# Patient Record
Sex: FEMALE | Race: WHITE | NOT HISPANIC OR LATINO | Employment: UNEMPLOYED | URBAN - METROPOLITAN AREA
[De-identification: names, ages, dates, MRNs, and addresses within clinical notes are randomized per-mention and may not be internally consistent; named-entity substitution may affect disease eponyms.]

---

## 2017-08-11 ENCOUNTER — PATIENT OUTREACH (OUTPATIENT)
Dept: HEALTH INFORMATION MANAGEMENT | Facility: OTHER | Age: 37
End: 2017-08-11

## 2017-08-11 ENCOUNTER — RESOLUTE PROFESSIONAL BILLING HOSPITAL PROF FEE (OUTPATIENT)
Dept: HOSPITALIST | Facility: MEDICAL CENTER | Age: 37
End: 2017-08-11
Payer: COMMERCIAL

## 2017-08-11 ENCOUNTER — HOSPITAL ENCOUNTER (OUTPATIENT)
Facility: MEDICAL CENTER | Age: 37
End: 2017-08-11
Attending: EMERGENCY MEDICINE | Admitting: INTERNAL MEDICINE
Payer: COMMERCIAL

## 2017-08-11 ENCOUNTER — APPOINTMENT (OUTPATIENT)
Dept: RADIOLOGY | Facility: MEDICAL CENTER | Age: 37
End: 2017-08-11
Attending: EMERGENCY MEDICINE
Payer: COMMERCIAL

## 2017-08-11 VITALS
SYSTOLIC BLOOD PRESSURE: 117 MMHG | HEIGHT: 67 IN | TEMPERATURE: 98.7 F | OXYGEN SATURATION: 91 % | WEIGHT: 293 LBS | RESPIRATION RATE: 16 BRPM | DIASTOLIC BLOOD PRESSURE: 82 MMHG | BODY MASS INDEX: 45.99 KG/M2 | HEART RATE: 77 BPM

## 2017-08-11 DIAGNOSIS — R63.0 ANOREXIA: ICD-10-CM

## 2017-08-11 DIAGNOSIS — G90.A POSTURAL ORTHOSTATIC TACHYCARDIA SYNDROME: ICD-10-CM

## 2017-08-11 DIAGNOSIS — I95.1 ORTHOSTATIC HYPOTENSION: ICD-10-CM

## 2017-08-11 PROBLEM — R73.9 HYPERGLYCEMIA: Status: ACTIVE | Noted: 2017-08-11

## 2017-08-11 PROBLEM — E87.6 HYPOKALEMIA: Status: ACTIVE | Noted: 2017-08-11

## 2017-08-11 PROBLEM — E87.1 HYPONATREMIA: Status: ACTIVE | Noted: 2017-08-11

## 2017-08-11 LAB
ALBUMIN SERPL BCP-MCNC: 4 G/DL (ref 3.2–4.9)
ALBUMIN/GLOB SERPL: 1.1 G/DL
ALP SERPL-CCNC: 76 U/L (ref 30–99)
ALT SERPL-CCNC: 22 U/L (ref 2–50)
ANION GAP SERPL CALC-SCNC: 15 MMOL/L (ref 0–11.9)
ANION GAP SERPL CALC-SCNC: 8 MMOL/L (ref 0–11.9)
APPEARANCE UR: ABNORMAL
AST SERPL-CCNC: 21 U/L (ref 12–45)
BACTERIA #/AREA URNS HPF: ABNORMAL /HPF
BASOPHILS # BLD AUTO: 0.6 % (ref 0–1.8)
BASOPHILS # BLD: 0.04 K/UL (ref 0–0.12)
BILIRUB SERPL-MCNC: 0.4 MG/DL (ref 0.1–1.5)
BILIRUB UR QL STRIP.AUTO: NEGATIVE
BNP SERPL-MCNC: <2 PG/ML (ref 0–100)
BUN SERPL-MCNC: 11 MG/DL (ref 8–22)
BUN SERPL-MCNC: 13 MG/DL (ref 8–22)
CALCIUM SERPL-MCNC: 9.1 MG/DL (ref 8.5–10.5)
CALCIUM SERPL-MCNC: 9.5 MG/DL (ref 8.5–10.5)
CHLORIDE SERPL-SCNC: 103 MMOL/L (ref 96–112)
CHLORIDE SERPL-SCNC: 107 MMOL/L (ref 96–112)
CO2 SERPL-SCNC: 15 MMOL/L (ref 20–33)
CO2 SERPL-SCNC: 22 MMOL/L (ref 20–33)
COLOR UR: YELLOW
CREAT SERPL-MCNC: 0.58 MG/DL (ref 0.5–1.4)
CREAT SERPL-MCNC: 0.77 MG/DL (ref 0.5–1.4)
CULTURE IF INDICATED INDCX: YES UA CULTURE
DEPRECATED D DIMER PPP IA-ACNC: 218 NG/ML(D-DU)
EKG IMPRESSION: NORMAL
EKG IMPRESSION: NORMAL
EOSINOPHIL # BLD AUTO: 0.03 K/UL (ref 0–0.51)
EOSINOPHIL NFR BLD: 0.5 % (ref 0–6.9)
EPI CELLS #/AREA URNS HPF: ABNORMAL /HPF
ERYTHROCYTE [DISTWIDTH] IN BLOOD BY AUTOMATED COUNT: 46.3 FL (ref 35.9–50)
EST. AVERAGE GLUCOSE BLD GHB EST-MCNC: 134 MG/DL
GFR SERPL CREATININE-BSD FRML MDRD: >60 ML/MIN/1.73 M 2
GFR SERPL CREATININE-BSD FRML MDRD: >60 ML/MIN/1.73 M 2
GLOBULIN SER CALC-MCNC: 3.7 G/DL (ref 1.9–3.5)
GLUCOSE BLD-MCNC: 173 MG/DL (ref 65–99)
GLUCOSE SERPL-MCNC: 159 MG/DL (ref 65–99)
GLUCOSE SERPL-MCNC: 166 MG/DL (ref 65–99)
GLUCOSE UR STRIP.AUTO-MCNC: ABNORMAL MG/DL
HBA1C MFR BLD: 6.3 % (ref 0–5.6)
HCG SERPL QL: NEGATIVE
HCT VFR BLD AUTO: 40.2 % (ref 37–47)
HGB BLD-MCNC: 12.9 G/DL (ref 12–16)
HYALINE CASTS #/AREA URNS LPF: ABNORMAL /LPF
IMM GRANULOCYTES # BLD AUTO: 0.02 K/UL (ref 0–0.11)
IMM GRANULOCYTES NFR BLD AUTO: 0.3 % (ref 0–0.9)
KETONES UR STRIP.AUTO-MCNC: >150 MG/DL
LEUKOCYTE ESTERASE UR QL STRIP.AUTO: NEGATIVE
LYMPHOCYTES # BLD AUTO: 1.12 K/UL (ref 1–4.8)
LYMPHOCYTES NFR BLD: 18.2 % (ref 22–41)
MCH RBC QN AUTO: 27 PG (ref 27–33)
MCHC RBC AUTO-ENTMCNC: 32.1 G/DL (ref 33.6–35)
MCV RBC AUTO: 84.1 FL (ref 81.4–97.8)
MICRO URNS: ABNORMAL
MONOCYTES # BLD AUTO: 0.37 K/UL (ref 0–0.85)
MONOCYTES NFR BLD AUTO: 6 % (ref 0–13.4)
MUCOUS THREADS #/AREA URNS HPF: ABNORMAL /HPF
NEUTROPHILS # BLD AUTO: 4.58 K/UL (ref 2–7.15)
NEUTROPHILS NFR BLD: 74.4 % (ref 44–72)
NITRITE UR QL STRIP.AUTO: NEGATIVE
NRBC # BLD AUTO: 0 K/UL
NRBC BLD AUTO-RTO: 0 /100 WBC
PH UR STRIP.AUTO: 6 [PH]
PLATELET # BLD AUTO: 382 K/UL (ref 164–446)
PMV BLD AUTO: 10.4 FL (ref 9–12.9)
POTASSIUM SERPL-SCNC: 3.5 MMOL/L (ref 3.6–5.5)
POTASSIUM SERPL-SCNC: 4.1 MMOL/L (ref 3.6–5.5)
PROT SERPL-MCNC: 7.7 G/DL (ref 6–8.2)
PROT UR QL STRIP: 100 MG/DL
RBC # BLD AUTO: 4.78 M/UL (ref 4.2–5.4)
RBC # URNS HPF: ABNORMAL /HPF
RBC UR QL AUTO: NEGATIVE
SODIUM SERPL-SCNC: 133 MMOL/L (ref 135–145)
SODIUM SERPL-SCNC: 137 MMOL/L (ref 135–145)
SP GR UR STRIP.AUTO: 1.02
T4 FREE SERPL-MCNC: 0.79 NG/DL (ref 0.53–1.43)
TROPONIN I SERPL-MCNC: <0.01 NG/ML (ref 0–0.04)
TROPONIN I SERPL-MCNC: <0.01 NG/ML (ref 0–0.04)
TSH SERPL DL<=0.005 MIU/L-ACNC: 2.99 UIU/ML (ref 0.3–3.7)
WBC # BLD AUTO: 6.2 K/UL (ref 4.8–10.8)
WBC #/AREA URNS HPF: ABNORMAL /HPF

## 2017-08-11 PROCEDURE — 84443 ASSAY THYROID STIM HORMONE: CPT

## 2017-08-11 PROCEDURE — 700102 HCHG RX REV CODE 250 W/ 637 OVERRIDE(OP): Performed by: EMERGENCY MEDICINE

## 2017-08-11 PROCEDURE — 36415 COLL VENOUS BLD VENIPUNCTURE: CPT

## 2017-08-11 PROCEDURE — 93005 ELECTROCARDIOGRAM TRACING: CPT | Performed by: HOSPITALIST

## 2017-08-11 PROCEDURE — 700105 HCHG RX REV CODE 258: Performed by: EMERGENCY MEDICINE

## 2017-08-11 PROCEDURE — A9270 NON-COVERED ITEM OR SERVICE: HCPCS | Performed by: EMERGENCY MEDICINE

## 2017-08-11 PROCEDURE — 71010 DX-CHEST-LIMITED (1 VIEW): CPT

## 2017-08-11 PROCEDURE — 84703 CHORIONIC GONADOTROPIN ASSAY: CPT

## 2017-08-11 PROCEDURE — 83880 ASSAY OF NATRIURETIC PEPTIDE: CPT

## 2017-08-11 PROCEDURE — 93010 ELECTROCARDIOGRAM REPORT: CPT | Performed by: INTERNAL MEDICINE

## 2017-08-11 PROCEDURE — A9270 NON-COVERED ITEM OR SERVICE: HCPCS | Performed by: HOSPITALIST

## 2017-08-11 PROCEDURE — 85025 COMPLETE CBC W/AUTO DIFF WBC: CPT

## 2017-08-11 PROCEDURE — 87086 URINE CULTURE/COLONY COUNT: CPT

## 2017-08-11 PROCEDURE — 84439 ASSAY OF FREE THYROXINE: CPT

## 2017-08-11 PROCEDURE — 96361 HYDRATE IV INFUSION ADD-ON: CPT

## 2017-08-11 PROCEDURE — 700111 HCHG RX REV CODE 636 W/ 250 OVERRIDE (IP): Performed by: EMERGENCY MEDICINE

## 2017-08-11 PROCEDURE — 83036 HEMOGLOBIN GLYCOSYLATED A1C: CPT

## 2017-08-11 PROCEDURE — 84484 ASSAY OF TROPONIN QUANT: CPT | Mod: 91

## 2017-08-11 PROCEDURE — 80053 COMPREHEN METABOLIC PANEL: CPT

## 2017-08-11 PROCEDURE — G0378 HOSPITAL OBSERVATION PER HR: HCPCS

## 2017-08-11 PROCEDURE — 85379 FIBRIN DEGRADATION QUANT: CPT

## 2017-08-11 PROCEDURE — 700102 HCHG RX REV CODE 250 W/ 637 OVERRIDE(OP): Performed by: HOSPITALIST

## 2017-08-11 PROCEDURE — 700101 HCHG RX REV CODE 250: Performed by: INTERNAL MEDICINE

## 2017-08-11 PROCEDURE — 81001 URINALYSIS AUTO W/SCOPE: CPT

## 2017-08-11 PROCEDURE — 700111 HCHG RX REV CODE 636 W/ 250 OVERRIDE (IP): Performed by: INTERNAL MEDICINE

## 2017-08-11 PROCEDURE — 93005 ELECTROCARDIOGRAM TRACING: CPT

## 2017-08-11 PROCEDURE — 99235 HOSP IP/OBS SAME DATE MOD 70: CPT | Performed by: INTERNAL MEDICINE

## 2017-08-11 PROCEDURE — 82962 GLUCOSE BLOOD TEST: CPT

## 2017-08-11 PROCEDURE — 96360 HYDRATION IV INFUSION INIT: CPT

## 2017-08-11 PROCEDURE — 80048 BASIC METABOLIC PNL TOTAL CA: CPT

## 2017-08-11 PROCEDURE — 99285 EMERGENCY DEPT VISIT HI MDM: CPT

## 2017-08-11 RX ORDER — SODIUM CHLORIDE AND POTASSIUM CHLORIDE 150; 900 MG/100ML; MG/100ML
INJECTION, SOLUTION INTRAVENOUS CONTINUOUS
Status: DISCONTINUED | OUTPATIENT
Start: 2017-08-11 | End: 2017-08-11 | Stop reason: HOSPADM

## 2017-08-11 RX ORDER — POTASSIUM CHLORIDE 20 MEQ/1
20 TABLET, EXTENDED RELEASE ORAL ONCE
Status: COMPLETED | OUTPATIENT
Start: 2017-08-11 | End: 2017-08-11

## 2017-08-11 RX ORDER — SODIUM CHLORIDE 9 MG/ML
1000 INJECTION, SOLUTION INTRAVENOUS ONCE
Status: COMPLETED | OUTPATIENT
Start: 2017-08-11 | End: 2017-08-11

## 2017-08-11 RX ORDER — ONDANSETRON 2 MG/ML
4 INJECTION INTRAMUSCULAR; INTRAVENOUS EVERY 4 HOURS PRN
Status: DISCONTINUED | OUTPATIENT
Start: 2017-08-11 | End: 2017-08-11 | Stop reason: HOSPADM

## 2017-08-11 RX ORDER — HEPARIN SODIUM 5000 [USP'U]/ML
5000 INJECTION, SOLUTION INTRAVENOUS; SUBCUTANEOUS EVERY 8 HOURS
Status: DISCONTINUED | OUTPATIENT
Start: 2017-08-11 | End: 2017-08-11 | Stop reason: HOSPADM

## 2017-08-11 RX ORDER — PROMETHAZINE HYDROCHLORIDE 25 MG/1
12.5-25 SUPPOSITORY RECTAL EVERY 4 HOURS PRN
Status: DISCONTINUED | OUTPATIENT
Start: 2017-08-11 | End: 2017-08-11 | Stop reason: HOSPADM

## 2017-08-11 RX ORDER — LISINOPRIL 5 MG/1
5 TABLET ORAL DAILY
Qty: 30 TAB | Refills: 1 | Status: SHIPPED | OUTPATIENT
Start: 2017-08-11 | End: 2020-02-01

## 2017-08-11 RX ORDER — FAMOTIDINE 20 MG/1
20 TABLET, FILM COATED ORAL 2 TIMES DAILY
Status: DISCONTINUED | OUTPATIENT
Start: 2017-08-11 | End: 2017-08-11 | Stop reason: HOSPADM

## 2017-08-11 RX ORDER — ONDANSETRON 2 MG/ML
4 INJECTION INTRAMUSCULAR; INTRAVENOUS ONCE
Status: COMPLETED | OUTPATIENT
Start: 2017-08-11 | End: 2017-08-11

## 2017-08-11 RX ORDER — FAMOTIDINE 20 MG/1
20 TABLET, FILM COATED ORAL 2 TIMES DAILY
Qty: 60 TAB | Refills: 1 | Status: SHIPPED | OUTPATIENT
Start: 2017-08-11 | End: 2020-02-01

## 2017-08-11 RX ORDER — ONDANSETRON 4 MG/1
4 TABLET, ORALLY DISINTEGRATING ORAL EVERY 4 HOURS PRN
Status: DISCONTINUED | OUTPATIENT
Start: 2017-08-11 | End: 2017-08-11 | Stop reason: HOSPADM

## 2017-08-11 RX ORDER — AMOXICILLIN 250 MG
2 CAPSULE ORAL 2 TIMES DAILY
Status: DISCONTINUED | OUTPATIENT
Start: 2017-08-11 | End: 2017-08-11 | Stop reason: HOSPADM

## 2017-08-11 RX ORDER — PROMETHAZINE HYDROCHLORIDE 25 MG/1
12.5-25 TABLET ORAL EVERY 4 HOURS PRN
Status: DISCONTINUED | OUTPATIENT
Start: 2017-08-11 | End: 2017-08-11 | Stop reason: HOSPADM

## 2017-08-11 RX ORDER — MORPHINE SULFATE 4 MG/ML
1 INJECTION, SOLUTION INTRAMUSCULAR; INTRAVENOUS
Status: DISCONTINUED | OUTPATIENT
Start: 2017-08-11 | End: 2017-08-11 | Stop reason: HOSPADM

## 2017-08-11 RX ORDER — ACETAMINOPHEN 325 MG/1
650 TABLET ORAL EVERY 6 HOURS PRN
Status: DISCONTINUED | OUTPATIENT
Start: 2017-08-11 | End: 2017-08-11 | Stop reason: HOSPADM

## 2017-08-11 RX ORDER — LISINOPRIL 10 MG/1
10 TABLET ORAL DAILY
COMMUNITY
End: 2020-02-01

## 2017-08-11 RX ORDER — BISACODYL 10 MG
10 SUPPOSITORY, RECTAL RECTAL
Status: DISCONTINUED | OUTPATIENT
Start: 2017-08-11 | End: 2017-08-11 | Stop reason: HOSPADM

## 2017-08-11 RX ORDER — POLYETHYLENE GLYCOL 3350 17 G/17G
1 POWDER, FOR SOLUTION ORAL
Status: DISCONTINUED | OUTPATIENT
Start: 2017-08-11 | End: 2017-08-11 | Stop reason: HOSPADM

## 2017-08-11 RX ADMIN — HEPARIN SODIUM 5000 UNITS: 5000 INJECTION, SOLUTION INTRAVENOUS; SUBCUTANEOUS at 06:46

## 2017-08-11 RX ADMIN — FAMOTIDINE 20 MG: 20 TABLET, FILM COATED ORAL at 11:14

## 2017-08-11 RX ADMIN — POTASSIUM CHLORIDE 20 MEQ: 1500 TABLET, EXTENDED RELEASE ORAL at 04:29

## 2017-08-11 RX ADMIN — SODIUM CHLORIDE 1000 ML: 9 INJECTION, SOLUTION INTRAVENOUS at 04:29

## 2017-08-11 RX ADMIN — ONDANSETRON 4 MG: 2 INJECTION INTRAMUSCULAR; INTRAVENOUS at 04:40

## 2017-08-11 RX ADMIN — SODIUM CHLORIDE 1000 ML: 9 INJECTION, SOLUTION INTRAVENOUS at 03:25

## 2017-08-11 RX ADMIN — POTASSIUM CHLORIDE AND SODIUM CHLORIDE: 900; 150 INJECTION, SOLUTION INTRAVENOUS at 09:00

## 2017-08-11 ASSESSMENT — COGNITIVE AND FUNCTIONAL STATUS - GENERAL
SUGGESTED CMS G CODE MODIFIER DAILY ACTIVITY: CH
MOBILITY SCORE: 24
SUGGESTED CMS G CODE MODIFIER MOBILITY: CH
DAILY ACTIVITIY SCORE: 24

## 2017-08-11 ASSESSMENT — ENCOUNTER SYMPTOMS
NECK PAIN: 0
DEPRESSION: 0
BLURRED VISION: 0
CHILLS: 0
ORTHOPNEA: 0
COUGH: 0
SPUTUM PRODUCTION: 0
FOCAL WEAKNESS: 0
EYE REDNESS: 0
BACK PAIN: 0
WEIGHT LOSS: 0
ABDOMINAL PAIN: 0
HEADACHES: 0
NAUSEA: 1
NERVOUS/ANXIOUS: 0
DIZZINESS: 0
FEVER: 0
EYE DISCHARGE: 0
EYE PAIN: 0
DIARRHEA: 0
SHORTNESS OF BREATH: 0
VOMITING: 0
STRIDOR: 0
INSOMNIA: 0
MYALGIAS: 0
SEIZURES: 0
HEARTBURN: 0
PALPITATIONS: 0

## 2017-08-11 ASSESSMENT — LIFESTYLE VARIABLES
EVER_SMOKED: YES
DO YOU DRINK ALCOHOL: NO
ALCOHOL_USE: NO

## 2017-08-11 ASSESSMENT — PAIN SCALES - GENERAL
PAINLEVEL_OUTOF10: 0
PAINLEVEL_OUTOF10: 8
PAINLEVEL_OUTOF10: 0
PAINLEVEL_OUTOF10: 8

## 2017-08-11 NOTE — ED NOTES
Pt assisted to bathroom by LIZ. Urine sample collected and sent to lab. IVFs infusing, see MAR. X-ray at bedside.

## 2017-08-11 NOTE — ED NOTES
"Chief Complaint   Patient presents with   • Chest Pain     Intermittent mid-sternal CP, pt states it feels like \"needles\" in chest   • Nausea/Vomiting/Diarrhea     /65 mmHg  Pulse 56  Temp(Src) 36.5 °C (97.7 °F)  Resp 16  Ht 1.702 m (5' 7\")  Wt 140.615 kg (310 lb)  BMI 48.54 kg/m2  SpO2 99%    Pt brought in by SERAFIN from work, pt states she became diaphoretic and felt like she was going to pass out. Pt has been fasting x6 days. Also reports N/V/D. EKG completed. PIV in place pta. Placed in room RD 2. Complaints and vitals as above. Pt on monitors, all alarms audible. Chart flagged for ERP to see.  "

## 2017-08-11 NOTE — LETTER
"  FORM C-4:  EMPLOYEE’S CLAIM FOR COMPENSATION/ REPORT OF INITIAL TREATMENT  EMPLOYEE’S CLAIM - PROVIDE ALL INFORMATION REQUESTED   First Name  Jennifer Last Name  Nathanael Birthdate             Age  1980 37 y.o. 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 (if applicable)  sitting    How did this injury or occupational disease occur? Be specific and answer in detail. Use additional sheet if necessary)  Unsure   If you believe that you have an occupational disease, when did you first have knowledge of the disability and it relationship to your employment?  n/a Witnesses to the Accident  Ludwin Castillo     Nature of Injury or Occupational Disease  Mental Stress  Part(s) of Body Injured or Affected  Heart, N/A, N/A    I certify that the above is true and correct to the best of my knowledge and that I have provided this information in order to obtain the benefits of [place] " inclusive or [place]).  I hereby authorize any physician, chiropractor, surgeon, practitioner, or other person, any hospital, including [facility] or [facility] hospital, any medical service organization, any insurance company, or other institution or organization to release to each other, any medical or other information, including benefits paid or payable, pertinent to this injury or disease, except information relative to diagnosis, treatment and/or counseling for AIDS, psychological conditions, alcohol or controlled substances, for which I must give specific authorization.  A Photostat of this authorization shall be as valid as the original.   Date  8/11/2017 Place  [facility]  Employee’s Signature   THIS REPORT MUST BE COMPLETED AND MAILED WITHIN 3 WORKING DAYS OF TREATMENT   Place  [facility], EMERGENCY DEPT  Name of Facility   [facility]   Date  8/11/2017 Diagnosis  (I95.1) Orthostatic hypotension  (R00.0,  I95.1) Postural orthostatic tachycardia syndrome  (R63.0) Anorexia Is there evidence the injured employee was under the influence of alcohol and/or another controlled substance at the time of accident?   Hour  4:43 AM Description of Injury or Disease  Orthostatic hypotension  Postural orthostatic tachycardia syndrome  Anorexia No   Treatment  Fluids  Have you advised the patient to remain off work five days or more?         No   X-Ray Findings      If Yes   From Date    To Date      From information given by the employee, together with medical evidence, can you directly connect this injury or occupational disease as job incurred?  No If No, is the employee capable of: Full Duty    Modified Duty      Is additional medical care by a physician indicated?  Yes If Modified Duty, Specify any Limitations / Restrictions        Do you know of any previous injury or disease contributing to this condition or occupational  "disease?  Yes  Comments:fasting, history of anorexia   Date  8/11/2017 Print Doctor’s Name  Cheyenne Panchal I certify the employer’s copy of this form was mailed on:   Address  [address] [address] [phone] Insurer’s Use Only   [facility]  [address]    Provider’s Tax ID Number  [id number] Telephone  Dept: [phone]    Doctor’s Signature  e-CHEYENNE Hawthorne M.D. Degree   MD     Original - TREATING PHYSICIAN OR CHIROPRACTOR   Pg 2-Insurer/TPA   Pg 3-Employer   Pg 4-Employee                                                                                                  Form C-4 (rev01/03)     BRIEF DESCRIPTION OF RIGHTS AND BENEFITS  (Pursuant to [place]RS 616C.050)    Notice of Injury or Occupational Disease (Incident Report Form C-1): If an injury or occupational disease (OD) arises out of and in the course of employment, you must provide written notice to your employer as soon as practicable, but no later than 7 days after the accident or OD. Your employer shall maintain a sufficient supply of the required forms.    Claim for Compensation (Form C-4): If medical treatment is sought, the form C-4 is available at the place of initial treatment. A completed \"Claim for Compensation\" (Form C-4) must be filed within 90 days after an accident or OD. The treating physician or chiropractor must, within 3 working days after treatment, complete and mail to the employer, the employer's insurer and third-party , the Claim for Compensation.    Medical Treatment: If you require medical treatment for your on-the-job injury or OD, you may be required to select a physician or chiropractor from a list provided by your workers’ compensation insurer, if it has contracted with an Organization for Managed Care (MCO) or Preferred Provider Organization (PPO) or providers of health care. If your employer has not entered into a contract with an MCO or PPO, you may select a physician or " chiropractor from the Panel of Physicians and Chiropractors. Any medical costs related to your industrial injury or OD will be paid by your insurer.    Temporary Total Disability (TTD): If your doctor has certified that you are unable to work for a period of at least 5 consecutive days, or 5 cumulative days in a 20-day period, or places restrictions on you that your employer does not accommodate, you may be entitled to TTD compensation.    Temporary Partial Disability (TPD): If the wage you receive upon reemployment is less than the compensation for TTD to which you are entitled, the insurer may be required to pay you TPD compensation to make up the difference. TPD can only be paid for a maximum of 24 months.    Permanent Partial Disability (PPD): When your medical condition is stable and there is an indication of a PPD as a result of your injury or OD, within 30 days, your insurer must arrange for an evaluation by a rating physician or chiropractor to determine the degree of your PPD. The amount of your PPD award depends on the date of injury, the results of the PPD evaluation and your age and wage.    Permanent Total Disability (PTD): If you are medically certified by a treating physician or chiropractor as permanently and totally disabled and have been granted a PTD status by your insurer, you are entitled to receive monthly benefits not to exceed 66 2/3% of your average monthly wage. The amount of your PTD payments is subject to reduction if you previously received a PPD award.    Vocational Rehabilitation Services: You may be eligible for vocational rehabilitation services if you are unable to return to the job due to a permanent physical impairment or permanent restrictions as a result of your injury or occupational disease.    Transportation and Per Bonifacio Reimbursement: You may be eligible for travel expenses and per bonifacio associated with medical treatment.  Reopening: You may be able to reopen your claim if  your condition worsens after claim closure.    Appeal Process: If you disagree with a written determination issued by the insurer or the insurer does not respond to your request, you may appeal to the Department of Administration, , by following the instructions contained in your determination letter. You must appeal the determination within 70 days from the date of the determination letter at [address], [address], [place], [place] [address], or [address], [place], [place] [address]. If you disagree with the  decision, you may appeal to the Department of Administration, . You must file your appeal within 30 days from the date of the  decision letter at [address], [address], [place], [place] [address], or [address], [place], [place] [address]. If you disagree with a decision of an , you may file a petition for judicial review with the District Court. You must do so within 30 days of the Appeal Officer’s decision. You may be represented by an  at your own expense or you may contact the [facility] for possible representation.    [place]  for Injured Workers (NAIW): If you disagree with a  decision, you may request that NAIW represent you without charge at an  Hearing. For information regarding denial of benefits, you may contact the [facility] at: [address], [address], [place] [address], [phone], or [address], [place] [address], [phone]    To File a Complaint with the Division: If you wish to file a complaint with the  of the Division of Industrial Relations (DIR), please contact the Workers’ Compensation Section, [address], [address], [place], [place] [address], telephone [phone], or [address], [place], [place] [address], telephone [phone]  [phone].    For assistance with Workers’ Compensation Issues: you may contact the Office of the Governor Consumer Health Assistance, [address] [place] [address], [address], [place] [address], Toll Free 1-198.707.4616, Web site: http://govcha.[facility].[email or web address]., E-mail [email or web address]                                                                                                                                                                               __________________________________________________________________                                    _________________            Employee Name / Signature                                                                                                                            Date                                       D-2 (rev. 10/07)

## 2017-08-11 NOTE — PROGRESS NOTES
Patient complains of 7/10 sharp mid-sternal chest pain that lasted for 1 minute. Previous troponin levels and EKG negative. Dr. Casas ordered antiacid.

## 2017-08-11 NOTE — H&P
" Hospital Medicine History and Physical      Date of Service  8/11/2017    Chief Complaint  Chief Complaint   Patient presents with   • Chest Pain     Intermittent mid-sternal CP, pt states it feels like \"needles\" in chest   • Nausea/Vomiting/Diarrhea       History of Presenting Illness  Nathanael is a 37 y.o. female w/h/o HTN: who presents with dizziness started around 10PM last night. She said she took her lisinopril around 9 pm. She works at night. She also mentioned that she is fasting and only drinking water for the past week, which she does it routinely and sometimes she fast for a month. In ER: she was found to have orthostatic hypotension. She was started on IVF in ER, will be admitted for IVF and close BP monitoring.    Primary Care Physician  Pcp Pt States None        Code Status  Full code per patient     Review of Systems  Review of Systems   Constitutional: Negative for fever, chills and weight loss.   HENT: Negative for congestion and nosebleeds.    Eyes: Negative for blurred vision, pain, discharge and redness.   Respiratory: Negative for cough, sputum production, shortness of breath and stridor.    Cardiovascular: Negative for chest pain, palpitations and orthopnea.   Gastrointestinal: Positive for nausea. Negative for heartburn, vomiting, abdominal pain and diarrhea.   Genitourinary: Negative for dysuria, urgency and frequency.   Musculoskeletal: Negative for myalgias, back pain and neck pain.   Skin: Negative for itching and rash.   Neurological: Negative for dizziness, focal weakness, seizures and headaches.   Psychiatric/Behavioral: Negative for depression. The patient is not nervous/anxious and does not have insomnia.      Please see HPI, all other systems were reviewed and are negative (AMA/CMS criteria)     Past Medical History  Past Medical History   Diagnosis Date   • Heart attack (CMS-HCC)      heart attack @ age of 17?   • Kidney stones    • Hypertension        Surgical History  History " reviewed. No pertinent past surgical history.    Medications  No current facility-administered medications on file prior to encounter.     Current Outpatient Prescriptions on File Prior to Encounter   Medication Sig Dispense Refill   • lisinopril (PRINIVIL) 10 MG Tab Take 1 Tab by mouth every day. 30 Tab 11     Family History  History reviewed. No pertinent family history.      Social History  Social History   Substance Use Topics   • Smoking status: Never Smoker    • Smokeless tobacco: None   • Alcohol Use: No       Allergies  Allergies   Allergen Reactions   • Hydrocodone Rash     Rxn = 2015   • Ibuprofen Unspecified     Hard on kidneys     • Latex Rash   • Tramadol Unspecified     Makes her sick          Physical Exam  Laboratory   Hemodynamics  Temp (24hrs), Av.5 °C (97.7 °F), Min:36.5 °C (97.7 °F), Max:36.5 °C (97.7 °F)   Temperature: 36.5 °C (97.7 °F)  Pulse  Av.9  Min: 56  Max: 127 Heart Rate (Monitored): 100  Blood Pressure: 113/65 mmHg, NIBP: 127/79 mmHg      Respiratory      Respiration: 18, Pulse Oximetry: 94 %             Physical Exam   Constitutional: She is oriented to person, place, and time. No distress.   HENT:   Head: Normocephalic and atraumatic.   Mouth/Throat: Oropharynx is clear and moist.   Eyes: Conjunctivae and EOM are normal. Pupils are equal, round, and reactive to light.   Neck: Normal range of motion. Neck supple. No tracheal deviation present. No thyromegaly present.   Cardiovascular: Normal rate and regular rhythm.    No murmur heard.  Pulmonary/Chest: Effort normal and breath sounds normal. No respiratory distress. She has no wheezes.   Abdominal: Soft. Bowel sounds are normal. She exhibits no distension. There is no tenderness.   Musculoskeletal: She exhibits no edema or tenderness.   Neurological: She is alert and oriented to person, place, and time. No cranial nerve deficit.   Skin: Skin is warm and dry. She is not diaphoretic. No erythema.   Psychiatric: She has a normal  mood and affect. Her behavior is normal. Thought content normal.       Recent Labs      08/11/17 0210   WBC  6.2   RBC  4.78   HEMOGLOBIN  12.9   HEMATOCRIT  40.2   MCV  84.1   MCH  27.0   MCHC  32.1*   RDW  46.3   PLATELETCT  382   MPV  10.4     Recent Labs      08/11/17 0210   SODIUM  133*   POTASSIUM  3.5*   CHLORIDE  103   CO2  15*   GLUCOSE  166*   BUN  13   CREATININE  0.77   CALCIUM  9.5     Recent Labs      08/11/17 0210   ALTSGPT  22   ASTSGOT  21   ALKPHOSPHAT  76   TBILIRUBIN  0.4   GLUCOSE  166*                 Lab Results   Component Value Date    TROPONINI <0.01 08/11/2017       Imaging  DX-CHEST-LIMITED (1 VIEW)   Final Result      Hypoinflation without other evidence for acute cardiopulmonary disease.           Assessment/Plan         Orthostatic hypotension (present on admission)  Assessment & Plan  Hold lisinopril  On IVF  Monitor BP closely    Hyponatremia (present on admission)  Assessment & Plan  On IVF    Hypokalemia (present on admission)  Assessment & Plan  Replete as needed    Hyperglycemia (present on admission)  Assessment & Plan  Check A1c  No history of DM        Prophylaxis:  sc heparin         This dictation was created using voice recognition software. The accuracy of the dictation is limited to the abilities of the software. I expect there may be some errors of grammar and possibly content.

## 2017-08-11 NOTE — DISCHARGE INSTRUCTIONS
Discharge Instructions    Discharged to home by car with relative. Discharged via wheelchair, hospital escort: Yes.  Special equipment needed: Not Applicable    Be sure to schedule a follow-up appointment with your primary care doctor or any specialists as instructed.     Discharge Plan:   Diet Plan: Discussed  Activity Level: Discussed  Confirmed Follow up Appointment: Patient to Call and Schedule Appointment  Confirmed Symptoms Management: Discussed  Medication Reconciliation Updated: Yes  Influenza Vaccine Indication: Patient Refuses    I understand that a diet low in cholesterol, fat, and sodium is recommended for good health. Unless I have been given specific instructions below for another diet, I accept this instruction as my diet prescription.     Other diet: Regular diet as tolerated. Please avoid fasting.     Special Instructions: None    · Is patient discharged on Warfarin / Coumadin?   No     · Is patient Post Blood Transfusion?  No    Hypotension  As your heart beats, it forces blood through your body. This force is called blood pressure. If you have hypotension, you have low blood pressure. When your blood pressure is too low, you may not get enough blood to your brain. You may feel weak, feel lightheaded, have a fast heartbeat, or even pass out (faint).  HOME CARE  · Drink enough fluids to keep your pee (urine) clear or pale yellow.  · Take all medicines as told by your doctor.  · Get up slowly after sitting or lying down.  · Wear support stockings as told by your doctor.  · Maintain a healthy diet by including foods such as fruits, vegetables, nuts, whole grains, and lean meats.  GET HELP IF:  · You are throwing up (vomiting) or have watery poop (diarrhea).  · You have a fever for more than 2-3 days.  · You feel more thirsty than usual.  · You feel weak and tired.  GET HELP RIGHT AWAY IF:   · You pass out (faint).  · You have chest pain or a fast or irregular heartbeat.  · You lose feeling in part of  your body.  · You cannot move your arms or legs.  · You have trouble speaking.  · You get sweaty or feel lightheaded.  MAKE SURE YOU:   · Understand these instructions.  · Will watch your condition.  · Will get help right away if you are not doing well or get worse.     This information is not intended to replace advice given to you by your health care provider. Make sure you discuss any questions you have with your health care provider.     Document Released: 03/14/2011 Document Revised: 08/20/2014 Document Reviewed: 06/20/2014  GMG33 Interactive Patient Education ©2016 [employer]vier Inc.      Depression / Suicide Risk    As you are discharged from this [facility] facility, it is important to learn how to keep safe from harming yourself.    Recognize the warning signs:  · Abrupt changes in personality, positive or negative- including increase in energy   · Giving away possessions  · Change in eating patterns- significant weight changes-  positive or negative  · Change in sleeping patterns- unable to sleep or sleeping all the time   · Unwillingness or inability to communicate  · Depression  · Unusual sadness, discouragement and loneliness  · Talk of wanting to die  · Neglect of personal appearance   · Rebelliousness- reckless behavior  · Withdrawal from people/activities they love  · Confusion- inability to concentrate     If you or a loved one observes any of these behaviors or has concerns about self-harm, here's what you can do:  · Talk about it- your feelings and reasons for harming yourself  · Remove any means that you might use to hurt yourself (examples: pills, rope, extension cords, firearm)  · Get professional help from the community (Mental Health, Substance Abuse, psychological counseling)  · Do not be alone:Call your Safe Contact- someone whom you trust who will be there for you.  · Call your local CRISIS HOTLINE 219-3011 or 355-629-8230  · Call your local Children's Mobile Crisis Response Team [place] (126) 046-5746 or www.[employer]  · Call the toll free National Suicide Prevention Hotlines   · National Suicide Prevention Lifeline 408-609-DBPI (6868)  · National [employer] Line Network 800-SUICIDE (484-9992)

## 2017-08-11 NOTE — PROGRESS NOTES
Pt to be discharged home. Discharge instructions given to pt and pt's mother prior to leaving unit including when to see PCP, and new medications. IV and monitor removed. All questions answered. Verbalized understanding. All belongings with pt when leaving unit. RX sent to pharmacy.

## 2017-08-11 NOTE — PROGRESS NOTES
"Patient reported 8/10 pain mid sternal chest pain. Patient states that the pain feels like pins and needles. Hurts more when taking a deep breath.  Night RN paged to room. Dr. Casas paged. Stat EKG ordered and troponin. Duration on chest pain about 3 minutes. Pt states that this chest pain is like the previous chest pain episode last night that \"comes and goes\". Discussed POC for the day with Pt. Call light within reach, encouraged pt to call for assistance.   "

## 2017-08-11 NOTE — ED PROVIDER NOTES
"ED Provider Note    Scribed for Brianna Panchal M.D. by Luci Smalls. 8/11/2017, 2:46 AM.    Primary care provider: Pcp Pt States None  Means of arrival: Ambulance  History obtained from: Patient  History limited by: None    CHIEF COMPLAINT  Chief Complaint   Patient presents with   • Chest Pain     Intermittent mid-sternal CP, pt states it feels like \"needles\" in chest   • Nausea/Vomiting/Diarrhea       HPI  Jennifer Huffman is a 37 y.o. female who presents to the Emergency Department via ambulance for fatigue and acute loss of energy earlier this evening. The patient states that she has been fasting for six days and when she took her blood pressure medication today, she became diaphoretic and fell at work. She has been drinking only water for the past 6 days with no other fluid intake and no food intake. She states she laid on the floor for an hour after her legs buckled under her but did not lose consciousness. The patient began to feel improved after drinking some juice and broth. However, she endorses nausea and vomiting with four episodes tonight and diarrhea onset 2200. The patient has experienced dizziness and chest pain that is exacerbated by deep inspiration and feels like \"pins and needles\". She notes that when she had a stress test done in December, she had a similar episode of near syncope. The patient adds that she has done similar fasts before for longer periods of time without adverse effects. The patient denies history of diabetes, hormonal birth control usage, history of DVT or PE.     REVIEW OF SYSTEMS  Pertinent positives include diaphoresis, chills, nausea, vomiting, diarrhea, fatigue, fasting, hypertension, chest tingling. Pertinent negatives include no loss of consciousness, diabetes, hormonal birth control, history of DVT or PE.  All other systems reviewed and negative.  C    PAST MEDICAL HISTORY   has a past medical history of Heart attack (CMS-[facility]); Kidney stones; and Hypertension.Stress " "test in December - Normal      SURGICAL HISTORY  patient denies any surgical history    SOCIAL HISTORY  Social History   Substance Use Topics   • Smoking status: Never Smoker    • Smokeless tobacco: None   • Alcohol Use: No      History   Drug Use No       FAMILY HISTORY  History reviewed. No pertinent family history.    CURRENT MEDICATIONS    No current facility-administered medications on file prior to encounter.     Current Outpatient Prescriptions on File Prior to Encounter   Medication Sig Dispense Refill   • lisinopril (PRINIVIL) 10 MG Tab Take 1 Tab by mouth every day. 30 Tab 11     ALLERGIES  Allergies   Allergen Reactions   • Hydrocodone Rash     Rxn = 2015   • Ibuprofen Unspecified     Hard on kidneys     • Latex Rash   • Tramadol Unspecified     Makes her sick         PHYSICAL EXAM  Vital Signs: /65 mmHg  Pulse 56  Temp(Src) 36.5 °C (97.7 °F)  Resp 16  Ht 1.702 m (5' 7\")  Wt 140.615 kg (310 lb)  BMI 48.54 kg/m2  SpO2 99%  Constitutional: Alert, no acute distress, Obese  HENT: Normocephalic, atraumatic, moist mucus membranes  Eyes: Pupils equal and reactive, normal conjunctiva, non-icteric  Neck: Supple, normal range of motion, no stridor  Cardiovascular: Regular rhythm, Normal peripheral perfusion, no cyanosis, Normal cardiac auscultation  Pulmonary: No respiratory distress, normal work of breathing, no accessory muscle usage, Clear to auscultation bilaterally  Abdomen: Soft, non tender, no peritoneal signs, bowel sounds are present.   Skin: Warm, dry, no rashes or lesions  Back: No pain with active range of motion  Musculoskeletal: Normal range of motion in all extremities, no swelling or deformity noted  Neurologic: Alert, oriented, normal motor function, no speech deficits  Psychiatric: Normal and appropriate mood and affect    DIAGNOSTIC STUDIES/PROCEDURES:    LABS  Labs Reviewed   CBC WITH DIFFERENTIAL - Abnormal; Notable for the following:     MCHC 32.1 (*)     Neutrophils-Polys 74.40 " (*)     Lymphocytes 18.20 (*)     All other components within normal limits   COMP METABOLIC PANEL - Abnormal; Notable for the following:     Sodium 133 (*)     Potassium 3.5 (*)     Co2 15 (*)     Anion Gap 15.0 (*)     Glucose 166 (*)     Globulin 3.7 (*)     All other components within normal limits   URINALYSIS,CULTURE IF INDICATED - Abnormal; Notable for the following:     Character Sl Cloudy (*)     Glucose Trace (*)     Ketones >150 (*)     Protein 100 (*)     All other components within normal limits   URINE MICROSCOPIC (W/UA) - Abnormal; Notable for the following:     WBC 5-10 (*)     Bacteria Few (*)     Epithelial Cells Moderate (*)     Hyaline Cast 3-5 (*)     All other components within normal limits   ACCU-CHEK GLUCOSE - Abnormal; Notable for the following:     Glucose - Accu-Ck 173 (*)     All other components within normal limits   TSH   FREE THYROXINE   HCG QUAL SERUM   D-DIMER   TROPONIN   ESTIMATED GFR   URINE CULTURE(NEW)     All labs reviewed by me.    EKG  12 Lead EKG interpreted by me to show:  Rate 103, sinus tachycardia, no ST elevation or depression, no T-wave inversions, and T-wave flattening in aVL    Radiology results revealed:   DX-CHEST-LIMITED (1 VIEW)   Final Result      Hypoinflation without other evidence for acute cardiopulmonary disease.        COURSE & MEDICAL DECISION MAKING  Pertinent Labs & Imaging studies reviewed. (See chart for details)    Differential diagnoses include but are not limited to: Electrolyte abnormality, hypokalemia, dehydration, orthostatic hypotension, DVT, pulmonary embolism    2:52 AM - Patient seen and examined at bedside. Patient will be treated with IV fluids secondary to dehydration. Ordered Chest X-ray, CBC, CMP, TSH, Free thyroxine, Urinalysis, Beta HCG Wal, D-Dimer, Troponin, Accu-chek glucose, EKG to evaluate her symptoms.     Decision Making:  This is a 37 y.o. year old female who presents with lightheadedness and near syncope earlier today. She  had 2 episodes of near syncope. States that she has had some associated vomiting and diarrhea for the past 24-48 hours. On arrival to the emergency department she has a normal heart rate, normal blood pressure while lying. Symptoms started after she took her home dose of lisinopril. Additionally as noted above she has been drinking only water for the past 6 days, has not taken any other food or fluids in.    Her physical exam is unremarkable. She has no abdominal tenderness to palpation, normal cardiopulmonary auscultation. She does have orthostatic hypotension with a drop in systolic blood pressure from 129 to 104 from sitting to standing, additionally she is orthostatic tachycardia with elevation of blood pressure from 111-127 when moving from a sitting to standing position.    On laboratory evaluation urinalysis significant for elevated ketones. Few bacteria, moderate epithelial cells, suspect this is contamination. White blood count is normal without evidence of infection. CO2 is low at 15, suspect associated diarrhea. Sodium and potassium are both low, this was treated with fluid bolus and oral potassium in the emergency department. HCG is negative ruling out pregnancy and pregnancy related complications. She has no pulmonary embolism risk factors, suspect her symptoms are due to fasting and recent antihypertensive use. In this low probability patient her d-dimer is negative, do not believe she requires CTA for further evaluation. EKG without evidence of ischemia or arrhythmia, troponin is undetectable.    On my reassessment, she is still feeling symptomatic, states that she is not able to ambulate without assistance due to severe lightheadedness. Given her inability to walk, orthostatic tachycardia and orthostatic hypotension plan is for admission for continued fluid resuscitation. Additionally this may be the effects of her lisinopril. She does state she has a history of anorexia and that the fasting means of  control. She may benefit from outpatient resources at discharge.    Admit to hospitalist service in guarded condition    FINAL IMPRESSION  1. Orthostatic hypotension    2. Postural orthostatic tachycardia syndrome    3. Anorexia          Luci ROME (Scribe), am scribing for, and in the presence of, Brianna Panchal M.D..    Electronically signed by: Luci Smalls (Scribe), 8/11/2017    Brianna ROME M.D. personally performed the services described in this documentation, as scribed by Luci Smalls in my presence, and it is both accurate and complete.    The note accurately reflects work and decisions made by me.  Brianna Panchal  8/11/2017  4:34 AM

## 2017-08-11 NOTE — PROGRESS NOTES
Med rec complete per pt at bedside  Allergies reviewed  No oral ABX within last 30 days   Pt states she thinks her Lisinopril is supposed to be cut in half to 5mg which was changed in hospital by doctor   Pt has been taking 10mg at home

## 2017-08-11 NOTE — IP AVS SNAPSHOT
" Home Care Instructions                                                                                                                  Name:Jennifer Huffman  Medical Record Number:[id number]  CSN: [id number]    YOB: 1980   Age: 37 y.o.  Sex: female  HT:1.702 m (5' 7.01\") WT: 137.3 kg (302 lb 11.1 oz)          Admit Date: 8/11/2017     Discharge Date:   Today's Date: 8/11/2017  Attending Doctor:  Cole Casas M.D.                  Allergies:  Bloodless; Ibuprofen; Latex; Tramadol; and Vicodin            Discharge Instructions       Discharge Instructions    Discharged to home by car with relative. Discharged via wheelchair, hospital escort: Yes.  Special equipment needed: Not Applicable    Be sure to schedule a follow-up appointment with your primary care doctor or any specialists as instructed.     Discharge Plan:   Diet Plan: Discussed  Activity Level: Discussed  Confirmed Follow up Appointment: Patient to Call and Schedule Appointment  Confirmed Symptoms Management: Discussed  Medication Reconciliation Updated: Yes  Influenza Vaccine Indication: Patient Refuses    I understand that a diet low in cholesterol, fat, and sodium is recommended for good health. Unless I have been given specific instructions below for another diet, I accept this instruction as my diet prescription.     Other diet: Regular diet as tolerated. Please avoid fasting.     Special Instructions: None    · Is patient discharged on Warfarin / Coumadin?   No     · Is patient Post Blood Transfusion?  No    Hypotension  As your heart beats, it forces blood through your body. This force is called blood pressure. If you have hypotension, you have low blood pressure. When your blood pressure is too low, you may not get enough blood to your brain. You may feel weak, feel lightheaded, have a fast heartbeat, or even pass out (faint).  HOME CARE  · Drink enough fluids to keep your pee (urine) clear or pale yellow.  · Take all medicines as told by " your doctor.  · Get up slowly after sitting or lying down.  · Wear support stockings as told by your doctor.  · Maintain a healthy diet by including foods such as fruits, vegetables, nuts, whole grains, and lean meats.  GET HELP IF:  · You are throwing up (vomiting) or have watery poop (diarrhea).  · You have a fever for more than 2-3 days.  · You feel more thirsty than usual.  · You feel weak and tired.  GET HELP RIGHT AWAY IF:   · You pass out (faint).  · You have chest pain or a fast or irregular heartbeat.  · You lose feeling in part of your body.  · You cannot move your arms or legs.  · You have trouble speaking.  · You get sweaty or feel lightheaded.  MAKE SURE YOU:   · Understand these instructions.  · Will watch your condition.  · Will get help right away if you are not doing well or get worse.     This information is not intended to replace advice given to you by your health care provider. Make sure you discuss any questions you have with your health care provider.     Document Released: 03/14/2011 Document Revised: 08/20/2014 Document Reviewed: 06/20/2014  [employer] Interactive Patient Education ©2016 [employer]      Depression / Suicide Risk    As you are discharged from this [facility] facility, it is important to learn how to keep safe from harming yourself.    Recognize the warning signs:  · Abrupt changes in personality, positive or negative- including increase in energy   · Giving away possessions  · Change in eating patterns- significant weight changes-  positive or negative  · Change in sleeping patterns- unable to sleep or sleeping all the time   · Unwillingness or inability to communicate  · Depression  · Unusual sadness, discouragement and loneliness  · Talk of wanting to die  · Neglect of personal appearance   · Rebelliousness- reckless behavior  · Withdrawal from people/activities they love  · Confusion- inability to concentrate     If you or a loved one observes any of these behaviors or  has concerns about self-harm, here's what you can do:  · Talk about it- your feelings and reasons for harming yourself  · Remove any means that you might use to hurt yourself (examples: pills, rope, extension cords, firearm)  · Get professional help from the community (Mental Health, Substance Abuse, psychological counseling)  · Do not be alone:Call your Safe Contact- someone whom you trust who will be there for you.  · Call your local CRISIS HOTLINE 734-7954 or 527-723-8563  · Call your local Children's Mobile Crisis Response Team [place] (346) 448-3861 or www.As It Is  · Call the toll free National Suicide Prevention Hotlines   · National Suicide Prevention Lifeline 291-464-EIRB (6351)  · National Hope Line Network 800-SUICIDE (571-2590)        Follow-up Information     1. Follow up with [facility]. Schedule an appointment as soon as possible for a visit in 1 week.    Why:  Establish care with a PCP.    Contact information    [address] [place] [id number]  [phone]           Discharge Medication Instructions:    Below are the medications your physician expects you to take upon discharge:    Review all your home medications and newly ordered medications with your doctor and/or pharmacist. Follow medication instructions as directed by your doctor and/or pharmacist.    Please keep your medication list with you and share with your physician.               Medication List      START taking these medications        Instructions    Morning Afternoon Evening Bedtime    famotidine 20 MG Tabs   Last time this was given:  20 mg on 8/11/2017 11:14 AM   Commonly known as:  PEPCID   Next Dose Due:  This evening, 8/11/2017, as needed.         Take 1 Tab by mouth 2 times a day. As needed for heart burn   Dose:  20 mg                          ASK your doctor about these medications        Instructions    Morning Afternoon Evening Bedtime    * lisinopril 10 MG Tabs   What changed:   Another medication with the same name was changed. Make sure you understand how and when to take each.   Commonly known as:  PRINIVIL   Ask about: Which instructions should I use?        Take 10 mg by mouth every day.   Dose:  10 mg                        * lisinopril 5 MG Tabs   What changed:    - medication strength  - how much to take  - additional instructions   Commonly known as:  PRINIVIL   Ask about: Which instructions should I use?        Take 1 Tab by mouth every day. Resume daily if eating, drinking well.   Dose:  5 mg                        * Notice:  This list has 2 medication(s) that are the same as other medications prescribed for you. Read the directions carefully, and ask your doctor or other care provider to review them with you.         Where to Get Your Medications      These medications were sent to [facility] - [address]  [address], [place] [address]     Phone:  [phone]    - famotidine 20 MG Tabs  - lisinopril 5 MG Tabs            Instructions           Diet / Nutrition:    Follow any diet instructions given to you by your doctor or the dietician, including how much salt (sodium) you are allowed each day.    If you are overweight, talk to your doctor about a weight reduction plan.    Activity:    Remain physically active following your doctor's instructions about exercise and activity.    Rest often.     Any time you become even a little tired or short of breath, SIT DOWN and rest.    Worsening Symptoms:    Report any of the following signs and symptoms to the doctor's office immediately:    *Pain of jaw, arm, or neck  *Chest pain not relieved by medication                               *Dizziness or loss of consciousness  *Difficulty breathing even when at rest   *More tired than usual                                       *Bleeding drainage or swelling of surgical site  *Swelling of feet, ankles, legs or stomach                 *Fever  (>100ºF)  *Pink or blood tinged sputum  *Weight gain (3lbs/day or 5lbs /week)           *Shock from internal defibrillator (if applicable)  *Palpitations or irregular heartbeats                *Cool and/or numb extremities    Stroke Awareness    Common Risk Factors for Stroke include:    Age  Atrial Fibrillation  Carotid Artery Stenosis  Diabetes Mellitus  Excessive alcohol consumption  High blood pressure  Overweight   Physical inactivity  Smoking    Warning signs and symptoms of a stroke include:    *Sudden numbness or weakness of the face, arm or leg (especially on one side of the body).  *Sudden confusion, trouble speaking or understanding.  *Sudden trouble seeing in one or both eyes.  *Sudden trouble walking, dizziness, loss of balance or coordination.Sudden severe headache with no known cause.    It is very important to get treatment quickly when a stroke occurs. If you experience any of the above warning signs, call 911 immediately.                   Disclaimer         Quit Smoking / Tobacco Use:    I understand the use of any tobacco products increases my chance of suffering from future heart disease or stroke and could cause other illnesses which may shorten my life. Quitting the use of tobacco products is the single most important thing I can do to improve my health. For further information on smoking / tobacco cessation call a Toll Free Quit Line at 1-116.774.4249 (*National Cancer [place]) or 1-312.765.8095 (American Lung Association) or you can access the web based program at www.lungusa.org.    [place] Tobacco Users Help Line:  (406) 394-7171       Toll Free: 1-166.969.9946  Quit Tobacco Program [facility] (442)203-7748    Crisis Hotline:    [place] Crisis Hotline:  6-372-BBALZXG or 1-113.356.4984    [place] Crisis Hotline:    1-207.916.3860 or 433-456-6529    Discharge Survey:   Thank you for choosing [facility]. We hope we did everything we could to make your hospital stay  a pleasant one. You may be receiving a phone survey and we would appreciate your time and participation in answering the questions. Your input is very valuable to us in our efforts to improve our service to our patients and their families.        My signature on this form indicates that:    1. I have reviewed and understand the above information.  2. My questions regarding this information have been answered to my satisfaction.  3. I have formulated a plan with my discharge nurse to obtain my prescribed medications for home.                  Disclaimer         __________________________________                     __________       ________                       Patient Signature                                                 Date                    Time

## 2017-08-11 NOTE — CARE PLAN
Problem: Knowledge Deficit  Goal: Knowledge of disease process/condition, treatment plan, diagnostic tests, and medications will improve  Intervention: Assess knowledge level of disease process/condition, treatment plan, diagnostic tests, and medications  Pt educated regarding activity, diet, meds and plan of care. Verbalized understanding.       Problem: Pain Management  Goal: Pain level will decrease to patient’s comfort goal  Intervention: Follow pain managment plan developed in collaboration with patient and Interdisciplinary Team  Fall risk assessed every shift and fall precautions in place.  Pt educated to not get up without assistance.  Verbalized understanding.

## 2017-08-11 NOTE — IP AVS SNAPSHOT
8/11/2017    Jennifer Huffman  [address] [address]  [place] [address]    Dear Jennifer:    [facility] wants to ensure your discharge home is safe and you or your loved ones have had all of your questions answered regarding your care after you leave the hospital.    Below is a list of resources and contact information should you have any questions regarding your hospital stay, follow-up instructions, or active medical symptoms.    Questions or Concerns Regarding… Contact   Medical Questions Related to Your Discharge  (7 days a week, 8am-5pm) Contact a Nurse Care Coordinator   [phone]   Medical Questions Not Related to Your Discharge  (24 hours a day / 7 days a week)  Contact the Nurse Health Line   [phone]    Medications or Discharge Instructions Refer to your discharge packet   or contact your [facility] Primary Care Provider   [phone]   Follow-up Appointment(s) Schedule your appointment via [employer]   or contact Scheduling [phone]   Billing Review your statement via [employer]  or contact Billing [phone]   Medical Records Review your records via [employer]   or contact Medical Records [phone]     You may receive a telephone call within two days of discharge. This call is to make certain you understand your discharge instructions and have the opportunity to have any questions answered. You can also easily access your medical information, test results and upcoming appointments via the [employer]ig[employer] free online health management tool. You can learn more and sign up at [employer]ated[employer]Zigswitch. For assistance setting up your [employer] account, please call [phone].    Once again, we want to ensure your discharge home is safe and that you have a clear understanding of any next steps in your care. If you have any questions or concerns, please do not hesitate to contact us, we are here for you. Thank you for choosing [facility] for your healthcare needs.    Sincerely,    Your [facility] Healthcare Team

## 2017-08-11 NOTE — IP AVS SNAPSHOT
Vacunek Access Code: [email or web address]  Expires: 9/10/2017  3:39 PM    Vacunek  A secure, online tool to manage your health information     [employer]’s Vacunek® is a secure, online tool that connects you to your personalized health information from the privacy of your home -- day or night - making it very easy for you to manage your healthcare. Once the activation process is completed, you can even access your medical information using the Vacunek may, which is available for free in the Apple May store or Google Play store.     Vacunek provides the following levels of access (as shown below):   My Chart Features   [facility] Primary Care Doctor [facility]  Specialists [facility]  Urgent  Care Non-[facility]  Primary Care  Doctor   Email your healthcare team securely and privately 24/7 X X X X   Manage appointments: schedule your next appointment; view details of past/upcoming appointments X      Request prescription refills. X      View recent personal medical records, including lab and immunizations X X X X   View health record, including health history, allergies, medications X X X X   Read reports about your outpatient visits, procedures, consult and ER notes X X X X   See your discharge summary, which is a recap of your hospital and/or ER visit that includes your diagnosis, lab results, and care plan. X X       How to register for Vacunek:  1. Go to  [email or web address].  2. Click on the Sign Up Now box, which takes you to the New Member Sign Up page. You will need to provide the following information:  a. Enter your Vacunek Access Code exactly as it appears at the top of this page. (You will not need to use this code after you’ve completed the sign-up process. If you do not sign up before the expiration date, you must request a new code.)   b. Enter your date of birth.   c. Enter your home email address.   d. Click Submit, and follow the next screen’s instructions.  3. Create a Vacunek ID. This will be your Vacunek  login ID and cannot be changed, so think of one that is secure and easy to remember.  4. Create a TSCA password. You can change your password at any time.  5. Enter your Password Reset Question and Answer. This can be used at a later time if you forget your password.   6. Enter your e-mail address. This allows you to receive e-mail notifications when new information is available in TSCA.  7. Click Sign Up. You can now view your health information.    For assistance activating your TSCA account, call [phone]

## 2017-08-11 NOTE — LETTER
"  FORM C-4:  EMPLOYEE’S CLAIM FOR COMPENSATION/ REPORT OF INITIAL TREATMENT  EMPLOYEE’S CLAIM - PROVIDE ALL INFORMATION REQUESTED   First Name  Jennifer Last Name  Nathanael Birthdate             Age  1980 37 y.o. Sex  female Claim Number   Home Employee Address  [address] [address]  [facility]                                     Zip  [address] Height  1.702 m (5' 7\") Weight  137.5 kg (303 lb 2.1 oz) N  [id number]   Mailing Employee Address                           [address] [address]   [facility] [place] [facility] [place]               Zip  [address] Telephone  [phone] (home)  Primary Language Spoken  ENGLISH   Insurer  *** Third Party   REPUBLIC INDEMNITY Employee's Occupation (Job Title) When Injury or Occupational Disease Occurred     Employer's Name   Telephone  [phone]    Employer Address  [address] [facility] [address]] Zip  [address]   Date of Injury  8/10/2017       Hour of Injury  10:00 PM Date Employer Notified  8/10/2017 Last Day of Work after Injury or Occupational Disease  8/10/2017 Supervisor to Whom Injury Reported  Obdulia Jones   Address or Location of Accident (if applicable)  [[address]]   What were you doing at the time of accident? (if applicable)  sitting    How did this injury or occupational disease occur? Be specific and answer in detail. Use additional sheet if necessary)  Unsure   If you believe that you have an occupational disease, when did you first have knowledge of the disability and it relationship to your employment?  n/a Witnesses to the Accident  Ludwin Castillo     Nature of Injury or Occupational Disease  Mental Stress  Part(s) of Body Injured or Affected  Heart, N/A, N/A    I certify that the above is true and correct to the best of my knowledge and that I have provided this information in order to obtain the benefits of [place] or [place]).  I hereby " authorize any physician, chiropractor, surgeon, practitioner, or other person, any hospital, including [facility] or [facility], any medical service organization, any insurance company, or other institution or organization to release to each other, any medical or other information, including benefits paid or payable, pertinent to this injury or disease, except information relative to diagnosis, treatment and/or counseling for AIDS, psychological conditions, alcohol or controlled substances, for which I must give specific authorization.  A Photostat of this authorization shall be as valid as the original.   Date Place   Employee’s Signature   THIS REPORT MUST BE COMPLETED AND MAILED WITHIN 3 WORKING DAYS OF TREATMENT   Place  [facility], EMERGENCY DEPT  Name of Facility   [facility]   Date  8/11/2017 Diagnosis  (I95.1) Orthostatic hypotension  (R00.0,  I95.1) Postural orthostatic tachycardia syndrome  (R63.0) Anorexia Is there evidence the injured employee was under the influence of alcohol and/or another controlled substance at the time of accident?   Hour  4:36 AM Description of Injury or Disease  Orthostatic hypotension  Postural orthostatic tachycardia syndrome  Anorexia     Treatment     Have you advised the patient to remain off work five days or more?             X-Ray Findings      If Yes   From Date    To Date      From information given by the employee, together with medical evidence, can you directly connect this injury or occupational disease as job incurred?    If No, is the employee capable of: Full Duty    Modified Duty      Is additional medical care by a physician indicated?    If Modified Duty, Specify any Limitations / Restrictions        Do you know of any previous injury or disease contributing to this condition or occupational disease?      Date  8/11/2017 Print Doctor’s Name  Brianna Panchal I certify the employer’s copy of this form  "was mailed on:   Address  [address] [address] [phone] Insurer’s Use Only   [facility]  [address]    Provider’s Tax ID Number  [id number] Telephone  Dept: [phone]    Doctor’s Signature    Degree       Original - TREATING PHYSICIAN OR CHIROPRACTOR   Pg 2-Insurer/TPA   Pg 3-Employer   Pg 4-Employee                                                                                                  Form C-4 (rev01/03)     BRIEF DESCRIPTION OF RIGHTS AND BENEFITS  (Pursuant to [place] 616[place])    Notice of Injury or Occupational Disease (Incident Report Form C-1): If an injury or occupational disease (OD) arises out of and in the course of employment, you must provide written notice to your employer as soon as practicable, but no later than 7 days after the accident or OD. Your employer shall maintain a sufficient supply of the required forms.    Claim for Compensation (Form C-4): If medical treatment is sought, the form C-4 is available at the place of initial treatment. A completed \"Claim for Compensation\" (Form C-4) must be filed within 90 days after an accident or OD. The treating physician or chiropractor must, within 3 working days after treatment, complete and mail to the employer, the employer's insurer and third-party , the Claim for Compensation.    Medical Treatment: If you require medical treatment for your on-the-job injury or OD, you may be required to select a physician or chiropractor from a list provided by your workers’ compensation insurer, if it has contracted with an Organization for Managed Care (MCO) or Preferred Provider Organization (PPO) or providers of health care. If your employer has not entered into a contract with an MCO or PPO, you may select a physician or chiropractor from the Panel of Physicians and Chiropractors. Any medical costs related to your industrial injury or OD will be paid by your insurer.    Temporary Total Disability " (TTD): If your doctor has certified that you are unable to work for a period of at least 5 consecutive days, or 5 cumulative days in a 20-day period, or places restrictions on you that your employer does not accommodate, you may be entitled to TTD compensation.    Temporary Partial Disability (TPD): If the wage you receive upon reemployment is less than the compensation for TTD to which you are entitled, the insurer may be required to pay you TPD compensation to make up the difference. TPD can only be paid for a maximum of 24 months.    Permanent Partial Disability (PPD): When your medical condition is stable and there is an indication of a PPD as a result of your injury or OD, within 30 days, your insurer must arrange for an evaluation by a rating physician or chiropractor to determine the degree of your PPD. The amount of your PPD award depends on the date of injury, the results of the PPD evaluation and your age and wage.    Permanent Total Disability (PTD): If you are medically certified by a treating physician or chiropractor as permanently and totally disabled and have been granted a PTD status by your insurer, you are entitled to receive monthly benefits not to exceed 66 2/3% of your average monthly wage. The amount of your PTD payments is subject to reduction if you previously received a PPD award.    Vocational Rehabilitation Services: You may be eligible for vocational rehabilitation services if you are unable to return to the job due to a permanent physical impairment or permanent restrictions as a result of your injury or occupational disease.    Transportation and Per Bonifacio Reimbursement: You may be eligible for travel expenses and per bonifacio associated with medical treatment.  Reopening: You may be able to reopen your claim if your condition worsens after claim closure.    Appeal Process: If you disagree with a written determination issued by the insurer or the insurer does not respond to your request,  you may appeal to the Department of Administration, , by following the instructions contained in your determination letter. You must appeal the determination within 70 days from the date of the determination letter at [address], [address], [place], [place] [address], or [address], [address], [place], [place] [address]. If you disagree with the  decision, you may appeal to the Department of Administration, . You must file your appeal within 30 days from the date of the  decision letter at [address], [address], [place], [place] [address], or [address], [address], [place], [place] [address]. If you disagree with a decision of an , you may file a petition for judicial review with the District Court. You must do so within 30 days of the Appeal Officer’s decision. You may be represented by an  at your own expense or you may contact the [facility] for possible representation.    [place]  for Injured Workers (NAIW): If you disagree with a  decision, you may request that NAIW represent you without charge at an  Hearing. For information regarding denial of benefits, you may contact the [facility] at: [address], [address], [place] [address], [phone], or [address], [place] [address], [phone]    To File a Complaint with the Division: If you wish to file a complaint with the  of the Division of Industrial Relations (DIR), please contact the Workers’ Compensation Section, [address], [address], [place], [place] [address], telephone [phone]) [phone], or [address], [facility] [address], [place] [address], telephone [phone].    For assistance with Workers’ Compensation Issues: you may contact the Office of the Governor Consumer Health Assistance, [address], [address], [facility]  [place] [address], Toll Free 1-826.133.3586, Web site: http://govcha.state.[place], E-mail [email or web address].                                                                                                                                                                               __________________________________________________________________                                    _________________            Employee Name / Signature                                                                                                                            Date                                       D-2 (rev. 10/07)

## 2017-08-11 NOTE — IP AVS SNAPSHOT
" <p align=\"LEFT\"><IMG SRC=\"//EMRWB/blob$/[email or web address]\" alt=\"Image\" WIDTH=\"50%\" HEIGHT=\"200\" BORDER=\"\"></p>                   Name:Jennifer Huffman  Medical Record Number:[id number]  CSN: [id number]    YOB: 1980   Age: 37 y.o.  Sex: female  HT:1.702 m (5' 7.01\") WT: 137.3 kg (302 lb 11.1 oz)          Admit Date: 8/11/2017     Discharge Date:   Today's Date: 8/11/2017  Attending Doctor:  Cole Casas M.D.                  Allergies:  Bloodless; Ibuprofen; Latex; Tramadol; and Vicodin          Follow-up Information     1. Follow up with [facility]. Schedule an appointment as soon as possible for a visit in 1 week.    Why:  Establish care with a PCP.    Contact information    [address] [place] [address] [id number] [phone]           Medication List      Take these Medications        Instructions    famotidine 20 MG Tabs   Commonly known as:  PEPCID    Take 1 Tab by mouth 2 times a day. As needed for heart burn   Dose:  20 mg         Ask your Physician about these medications        Instructions    * lisinopril 10 MG Tabs   What changed:  Another medication with the same name was changed. Make sure you understand how and when to take each.   Commonly known as:  PRINIVIL   Ask about: Which instructions should I use?    Take 10 mg by mouth every day.   Dose:  10 mg       * lisinopril 5 MG Tabs   What changed:    - medication strength  - how much to take  - additional instructions   Commonly known as:  PRINIVIL   Ask about: Which instructions should I use?    Take 1 Tab by mouth every day. Resume daily if eating, drinking well.   Dose:  5 mg       * Notice:  This list has 2 medication(s) that are the same as other medications prescribed for you. Read the directions carefully, and ask your doctor or other care provider to review them with you.      "

## 2017-08-12 NOTE — DISCHARGE SUMMARY
Hospital Medicine Discharge Note     Admit Date:  8/11/2017       Discharge Date:   8/11/2017    Attending Physician:  Cole Casas M.D.      Diagnoses (includes active and resolved):     Active Problems:    Orthostatic hypotension POA: Yes, due to severe dehydration,  resolved     Hyponatremia POA: Yes, hypovolemic, corrected     Hypokalemia POA: Yes, corrected .    Hyperglycemia POA: Yes, recommended for outpatient follow up, workup for Diabetes .    Chest pain resolved, recent stress test within past year negative for ischemia. Suspect GI etiology , dyspepsia .      Hospital Summary (Brief Narrative):       Nathanael is a 37 y.o. female w/h/o HTN: who presents with dizziness, N/V  started around 10PM last night. She said she took her lisinopril around 9 pm. She works at night. She also mentioned that she is fasting and only drinking water for the past week, which she does it routinely and sometimes she fast for a month. She was treated with IVFs, as needed anti emetics.  Symptoms resolved following day . Low Na and K corrected . Blood sugars in 110- 150s range may suggest borderline Dm .  Hgba1c 6.3.  She was recommended for diet modification, low carb, wt loss activities. To follow up with PCP regarding her high sugars.  Following fluid hydration, she felt markedly better .  Advised to cut down on her intense dieting and eat balanced low fat, carbohydrate meal daily . Trial Pepcid for Gerd symptoms.        Consultants:        None     Imaging/ Testing:      DX-CHEST-LIMITED (1 VIEW)   Final Result      Hypoinflation without other evidence for acute cardiopulmonary disease.            Procedures:          None     Discharge Medications:             Medication List      START taking these medications       Instructions    famotidine 20 MG Tabs   Last time this was given:  20 mg on 8/11/2017 11:14 AM   Commonly known as:  PEPCID   Next Dose Due:  This evening, 8/11/2017, as needed.     Take 1 Tab by mouth 2 times a  day. As needed for heart burn   Dose:  20 mg         ASK your doctor about these medications       Instructions    * lisinopril 10 MG Tabs   What changed:  Another medication with the same name was changed. Make sure you understand how and when to take each.   Commonly known as:  PRINIVIL   Ask about: Which instructions should I use?    Take 10 mg by mouth every day.   Dose:  10 mg       * lisinopril 5 MG Tabs   What changed:    - medication strength  - how much to take  - additional instructions   Commonly known as:  PRINIVIL   Ask about: Which instructions should I use?    Take 1 Tab by mouth every day. Resume daily if eating, drinking well.   Dose:  5 mg       * Notice:  This list has 2 medication(s) that are the same as other medications prescribed for you. Read the directions carefully, and ask your doctor or other care provider to review them with you.             Diet:             DIET ORDERS     None            Activity:   As tolerated.      Code status:   Full code    Primary Care Provider:    Pcp Pt States None    Follow up appointment details :      1-2 weeks.  [facility] [address] [place] [address]  [phone]    Schedule an appointment as soon as possible for a visit in 1 week                Time spent on discharge day patient visit: 35 minutes    #################################################

## 2017-08-13 LAB
BACTERIA UR CULT: NORMAL
SIGNIFICANT IND 70042: NORMAL
SITE SITE: NORMAL
SOURCE SOURCE: NORMAL

## 2018-03-26 ENCOUNTER — APPOINTMENT (OUTPATIENT)
Dept: RADIOLOGY | Facility: MEDICAL CENTER | Age: 38
End: 2018-03-26
Attending: EMERGENCY MEDICINE
Payer: COMMERCIAL

## 2018-03-26 ENCOUNTER — HOSPITAL ENCOUNTER (EMERGENCY)
Facility: MEDICAL CENTER | Age: 38
End: 2018-03-26
Attending: EMERGENCY MEDICINE
Payer: COMMERCIAL

## 2018-03-26 VITALS
OXYGEN SATURATION: 99 % | RESPIRATION RATE: 18 BRPM | HEART RATE: 98 BPM | BODY MASS INDEX: 45.99 KG/M2 | WEIGHT: 293 LBS | HEIGHT: 67 IN | DIASTOLIC BLOOD PRESSURE: 105 MMHG | TEMPERATURE: 97.9 F | SYSTOLIC BLOOD PRESSURE: 167 MMHG

## 2018-03-26 DIAGNOSIS — J40 BRONCHITIS: ICD-10-CM

## 2018-03-26 DIAGNOSIS — J06.9 UPPER RESPIRATORY TRACT INFECTION, UNSPECIFIED TYPE: ICD-10-CM

## 2018-03-26 LAB
FLUAV RNA SPEC QL NAA+PROBE: NEGATIVE
FLUBV RNA SPEC QL NAA+PROBE: NEGATIVE

## 2018-03-26 PROCEDURE — 96360 HYDRATION IV INFUSION INIT: CPT

## 2018-03-26 PROCEDURE — 99284 EMERGENCY DEPT VISIT MOD MDM: CPT

## 2018-03-26 PROCEDURE — 87502 INFLUENZA DNA AMP PROBE: CPT

## 2018-03-26 PROCEDURE — 71045 X-RAY EXAM CHEST 1 VIEW: CPT

## 2018-03-26 PROCEDURE — 700101 HCHG RX REV CODE 250: Performed by: EMERGENCY MEDICINE

## 2018-03-26 PROCEDURE — 94640 AIRWAY INHALATION TREATMENT: CPT

## 2018-03-26 PROCEDURE — 700105 HCHG RX REV CODE 258: Performed by: EMERGENCY MEDICINE

## 2018-03-26 RX ORDER — AZITHROMYCIN 250 MG/1
TABLET, FILM COATED ORAL
Qty: 6 TAB | Refills: 0 | Status: SHIPPED | OUTPATIENT
Start: 2018-03-26 | End: 2020-02-01

## 2018-03-26 RX ORDER — SODIUM CHLORIDE 9 MG/ML
1000 INJECTION, SOLUTION INTRAVENOUS ONCE
Status: COMPLETED | OUTPATIENT
Start: 2018-03-26 | End: 2018-03-26

## 2018-03-26 RX ADMIN — ALBUTEROL SULFATE 2.5 MG: 2.5 SOLUTION RESPIRATORY (INHALATION) at 08:13

## 2018-03-26 RX ADMIN — SODIUM CHLORIDE 1000 ML: 9 INJECTION, SOLUTION INTRAVENOUS at 07:30

## 2018-03-26 RX ADMIN — IPRATROPIUM BROMIDE 0.5 MG: 0.5 SOLUTION RESPIRATORY (INHALATION) at 08:13

## 2018-03-26 ASSESSMENT — ENCOUNTER SYMPTOMS
NAUSEA: 0
HEADACHES: 0
DIZZINESS: 0
FEVER: 1
COUGH: 1
ABDOMINAL PAIN: 0
MYALGIAS: 1
VOMITING: 0

## 2018-03-26 ASSESSMENT — PAIN SCALES - GENERAL: PAINLEVEL_OUTOF10: 5

## 2018-03-26 NOTE — ED NOTES
All discharge instructions given to pt as well as Rx for zithromax.  Pt advised not to drink or drive .  Pt verbalized understanding of all discharge instructions. All lines removed prior to discharge. All questions answered.

## 2018-03-26 NOTE — ED PROVIDER NOTES
ED Provider Note    ED Provider Note    Primary care provider: Pcp Pt States None  Means of arrival: POV  History obtained from: Patient  History limited by: none    CHIEF COMPLAINT  Chief Complaint   Patient presents with   • Flu Like Symptoms       HPI  Jennifer Huffman is a 37 y.o. female who presents to the Emergency Department with a chief complaint of cough, body aches and a fever at home. She states her cough is nonproductive. No nausea or vomiting. No urinary complaints. She does not smoke. She denies possibility of pregnancy. She states multiple work contacts have similar symptoms. She tried her rescue inhaler at home without significant improvement. She reports a history of hypertension and migraines but denies a headache at this time. Past surgical history.    REVIEW OF SYSTEMS  Review of Systems   Constitutional: Positive for fever.   HENT: Positive for congestion.    Respiratory: Positive for cough.    Cardiovascular: Negative for chest pain.   Gastrointestinal: Negative for abdominal pain, nausea and vomiting.   Genitourinary: Negative for dysuria and urgency.   Musculoskeletal: Positive for myalgias.   Skin: Negative for rash.   Neurological: Negative for dizziness and headaches.       PAST MEDICAL HISTORY   has a past medical history of Heart attack; Hypertension; and Kidney stones.    SURGICAL HISTORY  patient denies any surgical history    SOCIAL HISTORY  Social History   Substance Use Topics   • Smoking status: Never Smoker   • Smokeless tobacco: Not on file   • Alcohol use No      History   Drug Use No       FAMILY HISTORY  No family history on file.    CURRENT MEDICATIONS  Home Medications    **Home medications have not yet been reviewed for this encounter**         ALLERGIES  Allergies   Allergen Reactions   • Bloodless    • Ibuprofen Unspecified     Hard on kidneys     • Latex Rash   • Tramadol Unspecified     Makes her sick     • Vicodin [Hydrocodone-Acetaminophen] Rash     Rash  "      PHYSICAL EXAM  VITAL SIGNS: BP (!) 167/105 Comment: ERP aware, pt advised to take BP meds  Pulse 98   Temp 36.6 °C (97.9 °F) Comment: Retook temp. Pt states fever last night  Resp 18   Ht 1.702 m (5' 7\")   Wt (!) 150.4 kg (331 lb 9.2 oz)   SpO2 99%   BMI 51.93 kg/m²   Vitals reviewed.  Constitutional: Patient is oriented to person, place, and time. Appears well-developed and well-nourished. No distress.    Head: Normocephalic and atraumatic.   Ears: Normal external ears bilaterally.   Mouth/Throat: Oropharynx is clear and moist, no exudates.   Eyes: Conjunctivae are normal. Pupils are equal, round, and reactive to light.   Neck: Normal range of motion. Neck supple.  Cardiovascular: Tachycardia, regular rhythm and normal heart sounds. Normal peripheral pulses.  Pulmonary/Chest: Effort normal and breath sounds normal. No respiratory distress, no wheezes, rhonchi, or rales.   Abdominal: Soft. Bowel sounds are normal. There is no tenderness. No rebound or guarding, or peritoneal signs.   Musculoskeletal: No edema and no tenderness.   Lymphadenopathy: No cervical adenopathy.   Neurological: No focal deficits.   Skin: Skin is warm and dry. No erythema. No pallor.   Psychiatric: Patient has a normal mood and affect.     LABS  Results for orders placed or performed during the hospital encounter of 03/26/18   INFLUENZA A/B BY PCR   Result Value Ref Range    Influenza virus A RNA Negative Negative    Influenza virus B, PCR Negative Negative       All labs reviewed by me.    RADIOLOGY  DX-CHEST-PORTABLE (1 VIEW)   Final Result         1. No acute cardiopulmonary abnormalities are identified.        The radiologist's interpretation of all radiological studies have been reviewed by me.    COURSE & MEDICAL DECISION MAKING  Pertinent Labs & Imaging studies reviewed. (See chart for details)    Obtained and reviewed past medical records. Last encounter was in admission to the hospital for hyponatremia, hypokalemia and " hyperglycemia. Chest chest pain at that time and noted to have a recent stress test within the past year that was negative. Patient was also seen in December 2016 with chest pain. She's been seen in the past for similar symptoms that she presents today with as well as history of chronic back pain. Multiple ED visits over the last several years.    10:49 AM - Patient seen and examined at bedside. Is an overall well-appearing although, obese female patient she is slightly tachycardic. Lungs are clear. Patient's rate about pneumonia and for Wednesday. She'll be given IV fluids for tachycardia and a history of a recent fever.    Patient reevaluated the bedside. Heart rate has normalized. Lungs are again clear after nebulizer therapy. Chest x-ray shows no evidence of pneumonia. She does not have influenza. She reports symptoms for approximately 2 weeks. Given the length of time, I have advised, that she may benefit from an antibiotic. Although this may also be viral in its etiology. She does want to try a course of antibiotics. She be discharged home with a Z-Chuck. She is otherwise well-appearing and nontoxic. She'll be discharged home in stable condition.    FINAL IMPRESSION  1. Bronchitis    2. Upper respiratory tract infection, unspecified type

## 2018-03-26 NOTE — PROGRESS NOTES
This CM spoke to the pt and verified that she has [employer].  She stated that she understands that [facility] is out of network for [facility] and that [facility] is in network, and that she understands she may have to pay higher out of pocket expenses here at [facility].  She stated that she prefers [facility] even though she may have to pay higher out of pocket expenses by coming to [facility] as opposed to going to [facility].  ERP informed of this.  No other issues identified at this time.

## 2018-03-26 NOTE — ED NOTES
Pt stated that she has had blood in past coughs but it is currently non productive. 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

## 2018-03-26 NOTE — DISCHARGE INSTRUCTIONS

## 2018-03-26 NOTE — ED TRIAGE NOTES
"Chief Complaint   Patient presents with   • Flu Like Symptoms     Patient ambulatory to triage. States that she has had a dry non-productive cough, fevers, and chest congestion for 2 weeks. Patient states that it is becoming increasingly hard to take deep breaths. /96   Pulse (!) 111   Temp 36.6 °C (97.9 °F) Comment: Retook temp. Pt states fever last night  Resp 18   Ht 1.702 m (5' 7\")   Wt (!) 150.4 kg (331 lb 9.2 oz)   SpO2 96%   BMI 51.93 kg/m²     "

## 2018-03-27 ENCOUNTER — PATIENT OUTREACH (OUTPATIENT)
Dept: HEALTH INFORMATION MANAGEMENT | Facility: OTHER | Age: 38
End: 2018-03-27

## 2020-02-01 ENCOUNTER — HOSPITAL ENCOUNTER (EMERGENCY)
Facility: MEDICAL CENTER | Age: 40
End: 2020-02-01
Attending: EMERGENCY MEDICINE
Payer: MEDICAID

## 2020-02-01 ENCOUNTER — APPOINTMENT (OUTPATIENT)
Dept: RADIOLOGY | Facility: MEDICAL CENTER | Age: 40
End: 2020-02-01
Attending: EMERGENCY MEDICINE
Payer: MEDICAID

## 2020-02-01 VITALS
WEIGHT: 293 LBS | HEART RATE: 87 BPM | HEIGHT: 67 IN | DIASTOLIC BLOOD PRESSURE: 102 MMHG | TEMPERATURE: 98.2 F | BODY MASS INDEX: 45.99 KG/M2 | OXYGEN SATURATION: 98 % | RESPIRATION RATE: 18 BRPM | SYSTOLIC BLOOD PRESSURE: 170 MMHG

## 2020-02-01 DIAGNOSIS — M79.675 PAIN OF LEFT GREAT TOE: ICD-10-CM

## 2020-02-01 PROCEDURE — 700102 HCHG RX REV CODE 250 W/ 637 OVERRIDE(OP): Performed by: EMERGENCY MEDICINE

## 2020-02-01 PROCEDURE — 73630 X-RAY EXAM OF FOOT: CPT | Mod: LT

## 2020-02-01 PROCEDURE — A9270 NON-COVERED ITEM OR SERVICE: HCPCS | Performed by: EMERGENCY MEDICINE

## 2020-02-01 PROCEDURE — 99284 EMERGENCY DEPT VISIT MOD MDM: CPT

## 2020-02-01 RX ORDER — ACETAMINOPHEN 500 MG
1000 TABLET ORAL ONCE
Status: COMPLETED | OUTPATIENT
Start: 2020-02-01 | End: 2020-02-01

## 2020-02-01 RX ADMIN — ACETAMINOPHEN 1000 MG: 500 TABLET ORAL at 13:42

## 2020-02-01 NOTE — ED TRIAGE NOTES
Chief Complaint   Patient presents with   • Digit Pain     left big and 3rd toe fell > wks ago     Pt ambulated to triage , fell 1 1/2 wks ago injuring left big and 3rd toe.

## 2020-02-01 NOTE — ED PROVIDER NOTES
"ED Provider Note    Scribed for No att. providers found by Elise Brar. 2/1/2020, 12:38 PM.    Primary care provider: Pcp Pt States None  Means of arrival: walk in   History obtained from: patient   History limited by: none     CHIEF COMPLAINT  Chief Complaint   Patient presents with   • Digit Pain     left big and 3rd toe fell > wks ago       HPI  Jennifer Huffman is a 39 y.o. female who presents to the Emergency Department for evaluation of left lower extremity digit pain onset 1 week ago when she fell down a flight of stairs. She notes associated radiating pain up her left shin. She notes that range of motion exacerbates her pain and her foot can no longer bear weight without pain. She denies any alleviating factors at this time. She denies any other bodily pains.      REVIEW OF SYSTEMS  Pertinent positives include left toe pain, left shin pain. Pertinent negatives include no other bodily pains.  See HPI for further details.     PAST MEDICAL HISTORY   has a past medical history of Heart attack (HCC), Hypertension, and Kidney stones.    SURGICAL HISTORY  None.     SOCIAL HISTORY  Social History     Tobacco Use   • Smoking status: Never Smoker   Substance Use Topics   • Alcohol use: No   • Drug use: No      Social History     Substance and Sexual Activity   Drug Use No       FAMILY HISTORY  None.     CURRENT MEDICATIONS  No current facility-administered medications for this encounter.   No current outpatient medications on file.     ALLERGIES  Allergies   Allergen Reactions   • Bloodless    • Ibuprofen Unspecified     Hard on kidneys     • Latex Rash   • Tramadol Unspecified     Makes her sick     • Vicodin [Hydrocodone-Acetaminophen] Rash     Rash       PHYSICAL EXAM  VITAL SIGNS: BP (!) 166/104   Pulse 100   Temp 36.8 °C (98.2 °F) (Oral)   Resp 16   Ht 1.702 m (5' 7\")   Wt (!) 150.1 kg (330 lb 14.6 oz)   SpO2 97%   BMI 51.83 kg/m²   Vitals reviewed.    Consitutional: Well-developed, morbidly obese. " Negative for: distress.  HENT: Normocephalic, atraumatic, right external ear normal, left external ear normal, oropharynx clear and moist.  Eyes: Conjunctivae normal, negative for left and right eye discharge.  Neck: Range of motion normal, supple.   Musculoskeletal: Normal range of motion. Pain with active EHL. Tenderness at the MTP and IP joint at the great toe. No pain with longitudinal compression with toes 2,3,4 and 5. No tenderness to the fifth metatarsal. 2+ left DP pulse   Neurological: Alert and oriented x3.  Sensation intact left toes  Skin: Warm, dry. Negative for: erythema, rash.  No ecchymosis.  Intact  Psych: Mood/affect normal, behavior normal.    DIAGNOSTIC STUDIES / PROCEDURES    RADIOLOGY  DX-FOOT-COMPLETE 3+ LEFT   Final Result         No acute osseous abnormality.        The radiologist's interpretation of all radiological studies have been reviewed by me.    COURSE & MEDICAL DECISION MAKING  Nursing notes, VS, PMSFHx reviewed in chart.      12:38 PM - Patient seen and examined at bedside. The patient presents with left great toe pain and the differential diagnosis includes but is not limited to soft tissue versus bony injury. Ordered for DX toes left to evaluate.     1:36 PM - I updated the patient on her imaging, informing her that there was no evidence of fracture.  She is put into a postoperative shoe for comfort.  I discussed a plan of discharge with the patient, informing them to return to the ED for any new or worsening symptoms. Patient verbalizes understanding and agreement to this plan of discharge. I instructed the patient to follow up with an orthopedic doctor. The patient requested Tylenol for her pain. Ordered Tylenol 1000 mg.     The patient will return for new or worsening symptoms and is stable at the time of discharge.    The patient is referred to a primary physician for blood pressure management, diabetic screening, and for all other preventative health  "concerns.    DISPOSITION:  Patient will be discharged home in stable condition.    BP (!) 166/104   Pulse 100   Temp 36.8 °C (98.2 °F) (Oral)   Resp 16   Ht 1.702 m (5' 7\")   Wt (!) 150.1 kg (330 lb 14.6 oz)   SpO2 97%   BMI 51.83 kg/m²      FOLLOW UP:  [facility] [address] [id number] [phone]  Schedule an appointment as soon as possible for a visit         FINAL IMPRESSION  1. Pain of left great toe          I, Elise Brar (Stefaniibnoble), am scribing for, and in the presence of, No att. providers found.    Electronically signed by: Elise Brar (Stephanie), 2/1/2020    I, No att. providers found personally performed the services described in this documentation, as scribed by Elise Brar in my presence, and it is both accurate and complete. E    The note accurately reflects work and decisions made by me.  Kaity Palacios M.D.  2/1/2020  3:01 PM             "

## 2023-04-20 ENCOUNTER — PHARMACY VISIT (OUTPATIENT)
Dept: PHARMACY | Facility: MEDICAL CENTER | Age: 43
End: 2023-04-20
Payer: COMMERCIAL

## 2023-04-20 PROCEDURE — RXMED WILLOW AMBULATORY MEDICATION CHARGE

## 2023-04-20 RX ORDER — TIRZEPATIDE 2.5 MG/.5ML
INJECTION, SOLUTION SUBCUTANEOUS
Qty: 2 ML | Refills: 0 | OUTPATIENT
Start: 2023-04-18

## 2023-05-17 PROCEDURE — RXMED WILLOW AMBULATORY MEDICATION CHARGE

## 2023-05-18 ENCOUNTER — PHARMACY VISIT (OUTPATIENT)
Dept: PHARMACY | Facility: MEDICAL CENTER | Age: 43
End: 2023-05-18
Payer: COMMERCIAL

## 2023-06-15 ENCOUNTER — PHARMACY VISIT (OUTPATIENT)
Dept: PHARMACY | Facility: MEDICAL CENTER | Age: 43
End: 2023-06-15
Payer: COMMERCIAL

## 2023-06-15 PROCEDURE — RXMED WILLOW AMBULATORY MEDICATION CHARGE: Performed by: STUDENT IN AN ORGANIZED HEALTH CARE EDUCATION/TRAINING PROGRAM

## 2023-06-15 RX ORDER — OXYBUTYNIN CHLORIDE 10 MG/1
10 TABLET, EXTENDED RELEASE ORAL
Qty: 30 TABLET | Refills: 1 | Status: SHIPPED | OUTPATIENT
Start: 2023-06-15 | End: 2023-07-27 | Stop reason: SDUPTHER

## 2023-06-22 ENCOUNTER — HOSPITAL ENCOUNTER (EMERGENCY)
Facility: MEDICAL CENTER | Age: 43
End: 2023-06-23
Attending: EMERGENCY MEDICINE
Payer: MEDICAID

## 2023-06-22 ENCOUNTER — APPOINTMENT (OUTPATIENT)
Dept: RADIOLOGY | Facility: MEDICAL CENTER | Age: 43
End: 2023-06-22
Attending: EMERGENCY MEDICINE
Payer: MEDICAID

## 2023-06-22 DIAGNOSIS — R51.9 NONINTRACTABLE HEADACHE, UNSPECIFIED CHRONICITY PATTERN, UNSPECIFIED HEADACHE TYPE: ICD-10-CM

## 2023-06-22 DIAGNOSIS — R06.02 SHORTNESS OF BREATH: Primary | ICD-10-CM

## 2023-06-22 DIAGNOSIS — R53.1 GENERALIZED WEAKNESS: ICD-10-CM

## 2023-06-22 DIAGNOSIS — R07.9 CHEST PAIN, UNSPECIFIED TYPE: ICD-10-CM

## 2023-06-22 LAB
ALBUMIN SERPL BCP-MCNC: 4.3 G/DL (ref 3.2–4.9)
ALBUMIN/GLOB SERPL: 1.2 G/DL
ALP SERPL-CCNC: 75 U/L (ref 30–99)
ALT SERPL-CCNC: 21 U/L (ref 2–50)
ANION GAP SERPL CALC-SCNC: 15 MMOL/L (ref 7–16)
AST SERPL-CCNC: 11 U/L (ref 12–45)
BASOPHILS # BLD AUTO: 0.7 % (ref 0–1.8)
BASOPHILS # BLD: 0.06 K/UL (ref 0–0.12)
BILIRUB SERPL-MCNC: 0.3 MG/DL (ref 0.1–1.5)
BUN SERPL-MCNC: 38 MG/DL (ref 8–22)
CALCIUM ALBUM COR SERPL-MCNC: 9 MG/DL (ref 8.5–10.5)
CALCIUM SERPL-MCNC: 9.2 MG/DL (ref 8.5–10.5)
CHLORIDE SERPL-SCNC: 103 MMOL/L (ref 96–112)
CO2 SERPL-SCNC: 19 MMOL/L (ref 20–33)
CREAT SERPL-MCNC: 1.83 MG/DL (ref 0.5–1.4)
EKG IMPRESSION: NORMAL
EOSINOPHIL # BLD AUTO: 0.25 K/UL (ref 0–0.51)
EOSINOPHIL NFR BLD: 2.9 % (ref 0–6.9)
ERYTHROCYTE [DISTWIDTH] IN BLOOD BY AUTOMATED COUNT: 50 FL (ref 35.9–50)
GFR SERPLBLD CREATININE-BSD FMLA CKD-EPI: 34 ML/MIN/1.73 M 2
GLOBULIN SER CALC-MCNC: 3.5 G/DL (ref 1.9–3.5)
GLUCOSE SERPL-MCNC: 191 MG/DL (ref 65–99)
HCT VFR BLD AUTO: 37.2 % (ref 37–47)
HGB BLD-MCNC: 12 G/DL (ref 12–16)
IMM GRANULOCYTES # BLD AUTO: 0.03 K/UL (ref 0–0.11)
IMM GRANULOCYTES NFR BLD AUTO: 0.4 % (ref 0–0.9)
LACTATE SERPL-SCNC: 1.5 MMOL/L (ref 0.5–2)
LYMPHOCYTES # BLD AUTO: 2.03 K/UL (ref 1–4.8)
LYMPHOCYTES NFR BLD: 23.8 % (ref 22–41)
MCH RBC QN AUTO: 26.7 PG (ref 27–33)
MCHC RBC AUTO-ENTMCNC: 32.3 G/DL (ref 32.2–35.5)
MCV RBC AUTO: 82.9 FL (ref 81.4–97.8)
MONOCYTES # BLD AUTO: 0.3 K/UL (ref 0–0.85)
MONOCYTES NFR BLD AUTO: 3.5 % (ref 0–13.4)
NEUTROPHILS # BLD AUTO: 5.87 K/UL (ref 1.82–7.42)
NEUTROPHILS NFR BLD: 68.7 % (ref 44–72)
NRBC # BLD AUTO: 0 K/UL
NRBC BLD-RTO: 0 /100 WBC (ref 0–0.2)
PLATELET # BLD AUTO: 413 K/UL (ref 164–446)
PMV BLD AUTO: 10.4 FL (ref 9–12.9)
POTASSIUM SERPL-SCNC: 3.7 MMOL/L (ref 3.6–5.5)
PROT SERPL-MCNC: 7.8 G/DL (ref 6–8.2)
RBC # BLD AUTO: 4.49 M/UL (ref 4.2–5.4)
SODIUM SERPL-SCNC: 137 MMOL/L (ref 135–145)
TROPONIN T SERPL-MCNC: <6 NG/L (ref 6–19)
WBC # BLD AUTO: 8.5 K/UL (ref 4.8–10.8)

## 2023-06-22 PROCEDURE — 96375 TX/PRO/DX INJ NEW DRUG ADDON: CPT

## 2023-06-22 PROCEDURE — 83605 ASSAY OF LACTIC ACID: CPT

## 2023-06-22 PROCEDURE — 700105 HCHG RX REV CODE 258: Mod: UD | Performed by: EMERGENCY MEDICINE

## 2023-06-22 PROCEDURE — 85379 FIBRIN DEGRADATION QUANT: CPT

## 2023-06-22 PROCEDURE — 36415 COLL VENOUS BLD VENIPUNCTURE: CPT

## 2023-06-22 PROCEDURE — 85025 COMPLETE CBC W/AUTO DIFF WBC: CPT

## 2023-06-22 PROCEDURE — A9270 NON-COVERED ITEM OR SERVICE: HCPCS | Mod: UD | Performed by: EMERGENCY MEDICINE

## 2023-06-22 PROCEDURE — 700102 HCHG RX REV CODE 250 W/ 637 OVERRIDE(OP): Mod: UD | Performed by: EMERGENCY MEDICINE

## 2023-06-22 PROCEDURE — 80053 COMPREHEN METABOLIC PANEL: CPT

## 2023-06-22 PROCEDURE — 99285 EMERGENCY DEPT VISIT HI MDM: CPT

## 2023-06-22 PROCEDURE — 96374 THER/PROPH/DIAG INJ IV PUSH: CPT

## 2023-06-22 PROCEDURE — 84484 ASSAY OF TROPONIN QUANT: CPT

## 2023-06-22 PROCEDURE — 93005 ELECTROCARDIOGRAM TRACING: CPT

## 2023-06-22 PROCEDURE — 700111 HCHG RX REV CODE 636 W/ 250 OVERRIDE (IP): Mod: UD | Performed by: EMERGENCY MEDICINE

## 2023-06-22 PROCEDURE — 71045 X-RAY EXAM CHEST 1 VIEW: CPT

## 2023-06-22 PROCEDURE — 93005 ELECTROCARDIOGRAM TRACING: CPT | Performed by: EMERGENCY MEDICINE

## 2023-06-22 PROCEDURE — 87040 BLOOD CULTURE FOR BACTERIA: CPT | Mod: 91

## 2023-06-22 RX ORDER — SODIUM CHLORIDE 9 MG/ML
1000 INJECTION, SOLUTION INTRAVENOUS ONCE
Status: COMPLETED | OUTPATIENT
Start: 2023-06-22 | End: 2023-06-23

## 2023-06-22 RX ORDER — PROCHLORPERAZINE EDISYLATE 5 MG/ML
10 INJECTION INTRAMUSCULAR; INTRAVENOUS ONCE
Status: COMPLETED | OUTPATIENT
Start: 2023-06-22 | End: 2023-06-22

## 2023-06-22 RX ORDER — DIPHENHYDRAMINE HYDROCHLORIDE 50 MG/ML
25 INJECTION INTRAMUSCULAR; INTRAVENOUS ONCE
Status: COMPLETED | OUTPATIENT
Start: 2023-06-22 | End: 2023-06-22

## 2023-06-22 RX ORDER — ACETAMINOPHEN 325 MG/1
650 TABLET ORAL ONCE
Status: COMPLETED | OUTPATIENT
Start: 2023-06-22 | End: 2023-06-22

## 2023-06-22 RX ORDER — ONDANSETRON 2 MG/ML
4 INJECTION INTRAMUSCULAR; INTRAVENOUS ONCE
Status: DISCONTINUED | OUTPATIENT
Start: 2023-06-22 | End: 2023-06-23 | Stop reason: HOSPADM

## 2023-06-22 RX ADMIN — PROCHLORPERAZINE EDISYLATE 10 MG: 5 INJECTION INTRAMUSCULAR; INTRAVENOUS at 23:42

## 2023-06-22 RX ADMIN — ACETAMINOPHEN 650 MG: 325 TABLET, FILM COATED ORAL at 23:42

## 2023-06-22 RX ADMIN — DIPHENHYDRAMINE HYDROCHLORIDE 25 MG: 50 INJECTION, SOLUTION INTRAMUSCULAR; INTRAVENOUS at 23:42

## 2023-06-22 RX ADMIN — SODIUM CHLORIDE 1000 ML: 9 INJECTION, SOLUTION INTRAVENOUS at 23:43

## 2023-06-22 ASSESSMENT — PAIN DESCRIPTION - PAIN TYPE: TYPE: ACUTE PAIN

## 2023-06-22 NOTE — Clinical Note
Conrado Jeffries accompanied Jennifer Huffman to the emergency department on 6/22/2023. They may return to work on 06/24/2023.      If you have any questions or concerns, please don't hesitate to call.      Casandra Morrison M.D.

## 2023-06-23 VITALS
WEIGHT: 293 LBS | HEIGHT: 66 IN | OXYGEN SATURATION: 96 % | DIASTOLIC BLOOD PRESSURE: 81 MMHG | SYSTOLIC BLOOD PRESSURE: 119 MMHG | RESPIRATION RATE: 18 BRPM | BODY MASS INDEX: 47.09 KG/M2 | HEART RATE: 64 BPM | TEMPERATURE: 97.3 F

## 2023-06-23 LAB
D DIMER PPP IA.FEU-MCNC: 0.41 UG/ML (FEU) (ref 0–0.5)
TROPONIN T SERPL-MCNC: <6 NG/L (ref 6–19)

## 2023-06-23 PROCEDURE — 84484 ASSAY OF TROPONIN QUANT: CPT

## 2023-06-23 NOTE — ED NOTES
"Pt called this RN, upon entering room appears restless and anxious. Pt states \"I think I'm having a reaction to the medication you gave me.\" When this RN inquired about symptoms pt stated \"I don't know I just don't feel right.\" Upon further inquiry pt stated she has severe headache in the the back of her head. States previously reported headache was not in the back but in the front of her head. ERP notified.   "

## 2023-06-23 NOTE — ED TRIAGE NOTES
"Chief Complaint   Patient presents with    Weakness     Pt here for weakness, dizziness starting last week but today worsened. Pt having difficulty ambulating, +SOB, +nausea     BP (!) 166/145   Pulse (!) 117   Temp 36.3 °C (97.3 °F) (Temporal)   Resp 18   Ht 1.676 m (5' 6\")   Wt (!) 136 kg (299 lb 13.2 oz)   SpO2 100%   BMI 48.39 kg/m²     Pt here for weakness starting last week  Worsening today, struggles w/ ambulation, +SOB  Difficulty getting BP UA in triage    EKG completed in triage    Pt denies CP but pain to L arm, states vision changes w/ standing and SOB, along w/ dizziness  Hx of HTN and takes meds for it    Sepsis score of 3, protocol ordered    Pt to phleb by WC  "

## 2023-06-23 NOTE — DISCHARGE INSTRUCTIONS
Please remember to drink at least 1-2 liters of water or electrolyte drink. Your symptoms may be due to dehydration related to inadequate oral intake and diarrhea. Follow up with a primary care physician for your symptoms as soon as possible. Return to the ER if you have any further concerns or worsening symptoms.

## 2023-06-23 NOTE — ED PROVIDER NOTES
ED Provider Note    CHIEF COMPLAINT  Chief Complaint   Patient presents with    Weakness     Pt here for weakness, dizziness starting last week but today worsened. Pt having difficulty ambulating, +SOB, +nausea       EXTERNAL RECORDS REVIEWED  Other 2/1/2020 evaluated for left toe pain in the emergency department, discharged in good condition with outpatient follow-up.  12/15/2022 evaluation for bilateral shoulder pain at [facility].    HPI/ROS  LIMITATION TO HISTORY   Select: : None  OUTSIDE HISTORIAN(S):  Family mother    Jennifer Huffman is a 43 y.o. female who presents with multiple complaints including several weeks of worsening lightheadedness and shortness of breath with standing, vision change with standing, nausea, headache, and concerns that she is either going to pass out or have a seizure.  Patient explains that she does have a history of PTSD and never leaves her house.  She states over the past couple weeks she has been having fatigue and lightheadedness with standing.  She states it does get better when she sits down but sometimes she does experience the symptoms when she is sitting.  She states that she has been seen by her primary care provider for this fatigue but  has not gotten any answers.  Patient states she does have a history of migraines and currently her headache is in the front of her head and the light makes it worse.  She states that this is different from her previous migraines because it is lasting longer.  She takes topiramate for her migraines but it is not improved.  Patient  appears very anxious and tells me she feels like she is going to have a seizure and then also that she might be having a heart attack.  She denies chest pain but does complain of left shoulder pain that has been constant since arriving in the ER.  Nothing seems to make that better or worse.    PAST MEDICAL HISTORY   has a past medical history of Heart attack (HCC), Hypertension, and Kidney  "stones.    SURGICAL HISTORY  patient denies any surgical history    FAMILY HISTORY  History reviewed. No pertinent family history.    SOCIAL HISTORY  Social History     Tobacco Use    Smoking status: Never    Smokeless tobacco: Never   Substance and Sexual Activity    Alcohol use: No    Drug use: No    Sexual activity: Not on file       CURRENT MEDICATIONS  Home Medications       Reviewed by Mehnaz Hatfield R.N. (Registered Nurse) on 06/22/23 at 2201  Med List Status: Partial     Medication Last Dose Status   cyclobenzaprine (FLEXERIL) 5 mg tablet  Active   ibuprofen (MOTRIN) 800 MG Tab  Active   oxybutynin SR (DITROPAN-XL) 10 MG CR tablet  Active   Tirzepatide (MOUNJARO) 2.5 MG/0.5ML Solution Pen-injector  Active   Tirzepatide (MOUNJARO) 5 MG/0.5ML Solution Pen-injector  Active                    ALLERGIES  Allergies   Allergen Reactions    Bloodless     Latex Rash    Tramadol Unspecified     Makes her sick         PHYSICAL EXAM  VITAL SIGNS: /81   Pulse 64   Temp 36.3 °C (97.3 °F)   Resp 18   Ht 1.676 m (5' 6\")   Wt (!) 136 kg (299 lb 13.2 oz)   SpO2 96%   BMI 48.39 kg/m²    Physical Exam  Constitutional:       General: She is not in acute distress.     Appearance: Normal appearance. She is obese.   HENT:      Head: Normocephalic and atraumatic.      Right Ear: External ear normal.      Left Ear: External ear normal.   Eyes:      Extraocular Movements: Extraocular movements intact.      Conjunctiva/sclera: Conjunctivae normal.      Pupils: Pupils are equal, round, and reactive to light.   Cardiovascular:      Rate and Rhythm: Normal rate and regular rhythm.      Pulses: Normal pulses.      Heart sounds: No murmur heard.  Pulmonary:      Effort: Pulmonary effort is normal. No respiratory distress.      Breath sounds: Normal breath sounds.   Abdominal:      General: Abdomen is flat. Bowel sounds are normal. There is no distension.      Palpations: Abdomen is soft.      Tenderness: There is no " "abdominal tenderness.   Musculoskeletal:         General: No swelling or tenderness. Normal range of motion.      Cervical back: Normal range of motion. No rigidity.      Right lower leg: No edema.      Left lower leg: No edema.   Skin:     General: Skin is warm and dry.      Findings: No rash.   Neurological:      General: No focal deficit present.      Mental Status: She is alert and oriented to person, place, and time.           DIAGNOSTIC STUDIES / PROCEDURES  EKG  I have independently interpreted this EKG  Sinus rhythm, rate 93, normal intervals, left axis deviation borderline, no ST elevations or depressions, no Wellens or Brugada, no WPW, no significant change compared to previous EKG 8/11/2017    LABS  Labs Reviewed   CBC WITH DIFFERENTIAL - Abnormal; Notable for the following components:       Result Value    MCH 26.7 (*)     All other components within normal limits   COMP METABOLIC PANEL - Abnormal; Notable for the following components:    Co2 19 (*)     Glucose 191 (*)     Bun 38 (*)     Creatinine 1.83 (*)     AST(SGOT) 11 (*)     All other components within normal limits   ESTIMATED GFR - Abnormal; Notable for the following components:    GFR (CKD-EPI) 34 (*)     All other components within normal limits   LACTIC ACID   BLOOD CULTURE    Narrative:     Per Hospital Policy: Only change Specimen Src: to \"Line\" if  specified by physician order.  Left AC   D-DIMER   CORRECTED CALCIUM   TROPONIN   TROPONIN    Narrative:     Biotin intake of greater than 5 mg per day may interfere with  troponin levels, causing false low values.   URINALYSIS   URINE CULTURE(NEW)   BLOOD CULTURE    Narrative:     Per Hospital Policy: Only change Specimen Src: to \"Line\" if  specified by physician order.         RADIOLOGY  I have independently interpreted the diagnostic imaging associated with this visit and am waiting the final reading from the radiologist.   My preliminary interpretation is as follows: No acute " process  Radiologist interpretation:   DX-CHEST-PORTABLE (1 VIEW)   Final Result         1.  No acute cardiopulmonary disease.            COURSE & MEDICAL DECISION MAKING    ED Observation Status? Yes; I am placing the patient in to an observation status due to a diagnostic uncertainty as well as therapeutic intensity. Patient placed in observation status at 4:43 AM, 6/24/2023.     Observation plan is as follows: labs, migraine cocktail, IV fluids, reevaluation    Upon Reevaluation, the patient's condition has: Improved; and will be discharged.    Patient discharged from ED Observation status at 1:20 AM, 6/23/2022    INITIAL ASSESSMENT, COURSE AND PLAN  Care Narrative: Jennifer Huffman is a 43 y.o. female who presents with multiple complaints including several weeks of worsening lightheadedness and shortness of breath with standing, vision change with standing, nausea, headache, and concerns that she is either going to pass out or have a seizure.  Patient is afebrile, vital signs reassuring.  No focal neurodeficits on exam.  Pupils equal round reactive to light with no pupillary afferent defect.  No nystagmus, normal cerebellar neuro exam.  Clear lungs, rate heart rate, no peripheral edema or calf tenderness.  EKG no evidence of acute ischemic change.  Patient has had progressive lightheadedness with standing and a mild headache.  She does endorse minimal fluid intake over the past couple weeks as well.  Will order cardiac work-up, treat her for potential migraine, and reevaluate.  HYDRATION: Based on the patient's presentation of Dehydration the patient was given IV fluids. IV Hydration was used because oral hydration was not adequate alone. Upon recheck following hydration, the patient was improved.        DISPOSITION AND DISCUSSIONS  Repeat troponin x2 negative and unchanged.  EKG without acute ischemic change.  No anemia. No evidence to suggest DKA.  Patient does have MARIE and she is given IV fluids.  She  also endorses that she has not been drinking much fluid the past couple weeks.  Dehydration makes sense with her history of lightheadedness with standing.  Lactic is normal.  No leukocytosis.  Chest x-ray no acute process.  Low suspicion for ACS or pulmonary process.  Patient has no abdominal pain, or urinary complaints.  She was unable to offer urine.  Blood cultures are drawn to the patient's initial elevated heart rate and are pending.  I have low suspicion for sepsis and feel that her symptoms may more be related to anxiety and dehydration.  I have low suspicion for PE with normal D-dimer and no clinical evidence of DVT or significant history.  After migraine cocktail, patient states her headache is completely resolved and she is feeling much improved with IV fluids.  Ambulated to the restroom without difficulty.  Patient is informed of all results.  Do not feel that she requires further  emergent evaluation.  She is however encouraged to follow-up with her primary care provider and given strict ER to precautions.  She is also encouraged to drink at least 1 to 2 L of liquid such as water or electrolyte drink.  Patient is comfortable with this plan and she is discharged in good condition.  I have discussed management of the patient with the following physicians and JULIET's:  none    Discussion of management with other QHP or appropriate source(s): None     Escalation of care considered, and ultimately not performed:acute inpatient care management, however at this time, the patient is most appropriate for outpatient management    Barriers to care at this time, including but not limited to:  none .     Decision tools and prescription drugs considered including, but not limited to: D-dimer normal and HEART Score 2 .    FINAL DIAGNOSIS  1. Shortness of breath Acute   2. Generalized weakness Acute   3. Chest pain, unspecified type Acute   4. Nonintractable headache, unspecified chronicity pattern, unspecified headache  type Acute          DISPOSITION:  Patient will be discharged home in stable condition.    FOLLOW UP:  [facility]  [address] [address]  [phone]        [facility]  [address] [employer] [address]  [phone]        [facility], Emergency Dept  [address] [id number] [phone]    As needed, If symptoms worsen      OUTPATIENT MEDICATIONS:  Discharge Medication List as of 6/23/2023  1:46 AM          Electronically signed by: Casandra Morrison M.D., 6/22/2023 11:20 PM

## 2023-06-23 NOTE — ED NOTES
PIV placed. Pt tolerated placement well. Blood drawn, labelled with appropriate pt identifiers and sent to lab.

## 2023-06-23 NOTE — ED NOTES
"Pt sleeping in bed, NADN, states her headache has resolved and \"I think its time for me to go home now.\" Repeat troponin drawn and sent to lab.   "

## 2023-06-23 NOTE — ED NOTES
"Pt from lob to [facility] 10 via wheelchair. Pt stated to RN, \"Can you check my pupils, I feel like I'm going to have a seizure.\" RN asked about history of seizures and pt denied any known history. Pt stated \"I just feel like I'm going to pass out.\" Pt then stated to RN \"Am I having a heart attack, I think I'm having a heart attack?\" Pt denies CP, SOB, only reported symptom is headache and weakness x 1 week. Pt to room without incident.   "

## 2023-06-28 LAB
BACTERIA BLD CULT: NORMAL
BACTERIA BLD CULT: NORMAL
SIGNIFICANT IND 70042: NORMAL
SIGNIFICANT IND 70042: NORMAL
SITE SITE: NORMAL
SITE SITE: NORMAL
SOURCE SOURCE: NORMAL
SOURCE SOURCE: NORMAL

## 2023-07-12 PROCEDURE — RXMED WILLOW AMBULATORY MEDICATION CHARGE: Performed by: STUDENT IN AN ORGANIZED HEALTH CARE EDUCATION/TRAINING PROGRAM

## 2023-07-13 ENCOUNTER — PHARMACY VISIT (OUTPATIENT)
Dept: PHARMACY | Facility: MEDICAL CENTER | Age: 43
End: 2023-07-13
Payer: COMMERCIAL

## 2023-07-19 ENCOUNTER — PHARMACY VISIT (OUTPATIENT)
Dept: PHARMACY | Facility: MEDICAL CENTER | Age: 43
End: 2023-07-19
Payer: COMMERCIAL

## 2023-07-19 PROCEDURE — RXMED WILLOW AMBULATORY MEDICATION CHARGE

## 2023-07-19 RX ORDER — FERROUS SULFATE 325(65) MG
TABLET ORAL
Qty: 30 TABLET | Refills: 2 | OUTPATIENT
Start: 2023-07-19

## 2023-07-19 RX ORDER — TIRZEPATIDE 7.5 MG/.5ML
INJECTION, SOLUTION SUBCUTANEOUS
Qty: 2 ML | Refills: 1 | OUTPATIENT
Start: 2023-07-19

## 2023-07-19 RX ORDER — SENNOSIDES 8.6 MG
TABLET ORAL
Qty: 60 TABLET | Refills: 2 | OUTPATIENT
Start: 2023-07-19

## 2023-07-19 RX ORDER — LANCETS
EACH MISCELLANEOUS
Qty: 100 EACH | Refills: 12 | OUTPATIENT
Start: 2023-07-19

## 2023-08-07 PROCEDURE — RXMED WILLOW AMBULATORY MEDICATION CHARGE: Performed by: PSYCHIATRY & NEUROLOGY

## 2023-08-08 ENCOUNTER — PHARMACY VISIT (OUTPATIENT)
Dept: PHARMACY | Facility: MEDICAL CENTER | Age: 43
End: 2023-08-08
Payer: COMMERCIAL

## 2023-08-08 PROCEDURE — RXMED WILLOW AMBULATORY MEDICATION CHARGE: Performed by: STUDENT IN AN ORGANIZED HEALTH CARE EDUCATION/TRAINING PROGRAM

## 2023-08-12 PROCEDURE — RXMED WILLOW AMBULATORY MEDICATION CHARGE: Performed by: PSYCHIATRY & NEUROLOGY

## 2023-08-16 ENCOUNTER — PHARMACY VISIT (OUTPATIENT)
Dept: PHARMACY | Facility: MEDICAL CENTER | Age: 43
End: 2023-08-16
Payer: COMMERCIAL

## 2023-08-16 PROCEDURE — RXMED WILLOW AMBULATORY MEDICATION CHARGE: Performed by: PSYCHIATRY & NEUROLOGY

## 2023-08-16 PROCEDURE — RXMED WILLOW AMBULATORY MEDICATION CHARGE

## 2023-08-16 RX ORDER — POLYETHYLENE GLYCOL 3350 17 G/17G
POWDER, FOR SOLUTION ORAL
Qty: 238 G | Refills: 6 | OUTPATIENT
Start: 2023-08-16

## 2023-08-17 PROCEDURE — RXMED WILLOW AMBULATORY MEDICATION CHARGE

## 2023-08-18 ENCOUNTER — PHARMACY VISIT (OUTPATIENT)
Dept: PHARMACY | Facility: MEDICAL CENTER | Age: 43
End: 2023-08-18
Payer: COMMERCIAL

## 2023-08-22 PROCEDURE — RXMED WILLOW AMBULATORY MEDICATION CHARGE: Performed by: PSYCHIATRY & NEUROLOGY

## 2023-08-23 ENCOUNTER — PHARMACY VISIT (OUTPATIENT)
Dept: PHARMACY | Facility: MEDICAL CENTER | Age: 43
End: 2023-08-23
Payer: COMMERCIAL

## 2023-09-01 PROCEDURE — RXMED WILLOW AMBULATORY MEDICATION CHARGE

## 2023-09-03 PROCEDURE — RXMED WILLOW AMBULATORY MEDICATION CHARGE: Performed by: PSYCHIATRY & NEUROLOGY

## 2023-09-04 ENCOUNTER — PHARMACY VISIT (OUTPATIENT)
Dept: PHARMACY | Facility: MEDICAL CENTER | Age: 43
End: 2023-09-04
Payer: COMMERCIAL

## 2023-09-04 PROCEDURE — RXMED WILLOW AMBULATORY MEDICATION CHARGE: Performed by: STUDENT IN AN ORGANIZED HEALTH CARE EDUCATION/TRAINING PROGRAM

## 2023-09-04 RX ORDER — TIRZEPATIDE 5 MG/.5ML
INJECTION, SOLUTION SUBCUTANEOUS
Qty: 2 ML | Refills: 0 | Status: CANCELLED | OUTPATIENT
Start: 2023-09-04

## 2023-09-08 ENCOUNTER — PHARMACY VISIT (OUTPATIENT)
Dept: PHARMACY | Facility: MEDICAL CENTER | Age: 43
End: 2023-09-08
Payer: COMMERCIAL

## 2023-09-08 PROCEDURE — RXMED WILLOW AMBULATORY MEDICATION CHARGE

## 2023-09-08 PROCEDURE — RXMED WILLOW AMBULATORY MEDICATION CHARGE: Performed by: PSYCHIATRY & NEUROLOGY

## 2023-09-08 RX ORDER — TIRZEPATIDE 10 MG/.5ML
INJECTION, SOLUTION SUBCUTANEOUS
Qty: 2 ML | Refills: 0 | Status: SHIPPED | OUTPATIENT
Start: 2023-09-08 | End: 2023-09-25

## 2023-09-12 PROCEDURE — RXMED WILLOW AMBULATORY MEDICATION CHARGE: Performed by: PSYCHIATRY & NEUROLOGY

## 2023-09-12 PROCEDURE — RXMED WILLOW AMBULATORY MEDICATION CHARGE

## 2023-09-13 ENCOUNTER — PHARMACY VISIT (OUTPATIENT)
Dept: PHARMACY | Facility: MEDICAL CENTER | Age: 43
End: 2023-09-13
Payer: COMMERCIAL

## 2023-09-13 PROCEDURE — RXMED WILLOW AMBULATORY MEDICATION CHARGE

## 2023-09-16 PROCEDURE — RXMED WILLOW AMBULATORY MEDICATION CHARGE

## 2023-09-18 PROCEDURE — RXMED WILLOW AMBULATORY MEDICATION CHARGE: Performed by: PSYCHIATRY & NEUROLOGY

## 2023-09-19 ENCOUNTER — PHARMACY VISIT (OUTPATIENT)
Dept: PHARMACY | Facility: MEDICAL CENTER | Age: 43
End: 2023-09-19
Payer: COMMERCIAL

## 2023-10-01 PROCEDURE — RXMED WILLOW AMBULATORY MEDICATION CHARGE: Performed by: STUDENT IN AN ORGANIZED HEALTH CARE EDUCATION/TRAINING PROGRAM

## 2023-10-03 ENCOUNTER — PHARMACY VISIT (OUTPATIENT)
Dept: PHARMACY | Facility: MEDICAL CENTER | Age: 43
End: 2023-10-03
Payer: COMMERCIAL

## 2023-10-03 PROCEDURE — RXMED WILLOW AMBULATORY MEDICATION CHARGE

## 2023-10-03 RX ORDER — TOPIRAMATE 100 MG/1
TABLET, FILM COATED ORAL
Qty: 60 TABLET | Refills: 1 | Status: CANCELLED | OUTPATIENT
Start: 2023-10-03

## 2023-10-03 RX ORDER — BUPROPION HYDROCHLORIDE 300 MG/1
TABLET ORAL
Qty: 30 TABLET | Refills: 1 | Status: CANCELLED | OUTPATIENT
Start: 2023-10-03

## 2023-10-24 PROCEDURE — RXMED WILLOW AMBULATORY MEDICATION CHARGE

## 2023-10-28 ENCOUNTER — PHARMACY VISIT (OUTPATIENT)
Dept: PHARMACY | Facility: MEDICAL CENTER | Age: 43
End: 2023-10-28
Payer: COMMERCIAL

## 2023-10-28 PROCEDURE — RXMED WILLOW AMBULATORY MEDICATION CHARGE: Performed by: STUDENT IN AN ORGANIZED HEALTH CARE EDUCATION/TRAINING PROGRAM

## 2023-10-28 PROCEDURE — RXMED WILLOW AMBULATORY MEDICATION CHARGE

## 2023-11-13 PROCEDURE — RXMED WILLOW AMBULATORY MEDICATION CHARGE

## 2023-11-14 ENCOUNTER — PHARMACY VISIT (OUTPATIENT)
Dept: PHARMACY | Facility: MEDICAL CENTER | Age: 43
End: 2023-11-14
Payer: COMMERCIAL

## 2023-11-28 PROCEDURE — RXMED WILLOW AMBULATORY MEDICATION CHARGE

## 2023-11-28 PROCEDURE — RXMED WILLOW AMBULATORY MEDICATION CHARGE: Performed by: STUDENT IN AN ORGANIZED HEALTH CARE EDUCATION/TRAINING PROGRAM

## 2023-11-29 ENCOUNTER — PHARMACY VISIT (OUTPATIENT)
Dept: PHARMACY | Facility: MEDICAL CENTER | Age: 43
End: 2023-11-29
Payer: COMMERCIAL

## 2023-11-29 PROCEDURE — RXMED WILLOW AMBULATORY MEDICATION CHARGE: Performed by: PSYCHIATRY & NEUROLOGY

## 2023-12-04 PROCEDURE — RXMED WILLOW AMBULATORY MEDICATION CHARGE: Performed by: PSYCHIATRY & NEUROLOGY

## 2023-12-04 PROCEDURE — RXMED WILLOW AMBULATORY MEDICATION CHARGE: Performed by: STUDENT IN AN ORGANIZED HEALTH CARE EDUCATION/TRAINING PROGRAM

## 2023-12-04 PROCEDURE — RXMED WILLOW AMBULATORY MEDICATION CHARGE

## 2023-12-05 ENCOUNTER — PHARMACY VISIT (OUTPATIENT)
Dept: PHARMACY | Facility: MEDICAL CENTER | Age: 43
End: 2023-12-05
Payer: COMMERCIAL

## 2024-01-05 PROCEDURE — RXMED WILLOW AMBULATORY MEDICATION CHARGE: Performed by: PSYCHIATRY & NEUROLOGY

## 2024-01-06 ENCOUNTER — PHARMACY VISIT (OUTPATIENT)
Dept: PHARMACY | Facility: MEDICAL CENTER | Age: 44
End: 2024-01-06
Payer: COMMERCIAL

## 2024-01-10 PROCEDURE — RXMED WILLOW AMBULATORY MEDICATION CHARGE

## 2024-01-11 ENCOUNTER — PHARMACY VISIT (OUTPATIENT)
Dept: PHARMACY | Facility: MEDICAL CENTER | Age: 44
End: 2024-01-11
Payer: COMMERCIAL

## 2024-01-19 PROCEDURE — RXMED WILLOW AMBULATORY MEDICATION CHARGE

## 2024-01-22 ENCOUNTER — PHARMACY VISIT (OUTPATIENT)
Dept: PHARMACY | Facility: MEDICAL CENTER | Age: 44
End: 2024-01-22
Payer: COMMERCIAL

## 2024-01-22 PROCEDURE — RXMED WILLOW AMBULATORY MEDICATION CHARGE

## 2024-01-26 ENCOUNTER — PHARMACY VISIT (OUTPATIENT)
Dept: PHARMACY | Facility: MEDICAL CENTER | Age: 44
End: 2024-01-26
Payer: COMMERCIAL

## 2024-01-29 PROCEDURE — RXMED WILLOW AMBULATORY MEDICATION CHARGE: Performed by: PSYCHIATRY & NEUROLOGY

## 2024-01-30 ENCOUNTER — PHARMACY VISIT (OUTPATIENT)
Dept: PHARMACY | Facility: MEDICAL CENTER | Age: 44
End: 2024-01-30
Payer: COMMERCIAL

## 2024-01-30 PROCEDURE — RXMED WILLOW AMBULATORY MEDICATION CHARGE

## 2024-01-30 RX ORDER — ZOLPIDEM TARTRATE 12.5 MG/1
TABLET, FILM COATED, EXTENDED RELEASE ORAL
Qty: 30 TABLET | Refills: 1 | Status: CANCELLED | OUTPATIENT
Start: 2024-01-30

## 2024-02-04 PROCEDURE — RXMED WILLOW AMBULATORY MEDICATION CHARGE

## 2024-02-06 PROCEDURE — RXMED WILLOW AMBULATORY MEDICATION CHARGE: Performed by: PSYCHIATRY & NEUROLOGY

## 2024-02-07 PROCEDURE — RXMED WILLOW AMBULATORY MEDICATION CHARGE

## 2024-02-09 ENCOUNTER — PHARMACY VISIT (OUTPATIENT)
Dept: PHARMACY | Facility: MEDICAL CENTER | Age: 44
End: 2024-02-09
Payer: COMMERCIAL

## 2024-02-15 PROCEDURE — RXMED WILLOW AMBULATORY MEDICATION CHARGE

## 2024-02-21 ENCOUNTER — PHARMACY VISIT (OUTPATIENT)
Dept: PHARMACY | Facility: MEDICAL CENTER | Age: 44
End: 2024-02-21
Payer: COMMERCIAL

## 2024-02-21 RX ORDER — ZOLPIDEM TARTRATE 10 MG/1
TABLET ORAL
Qty: 30 TABLET | Refills: 2 | OUTPATIENT
Start: 2024-02-21

## 2024-03-08 ENCOUNTER — PHARMACY VISIT (OUTPATIENT)
Dept: PHARMACY | Facility: MEDICAL CENTER | Age: 44
End: 2024-03-08
Payer: COMMERCIAL

## 2024-03-08 PROCEDURE — RXMED WILLOW AMBULATORY MEDICATION CHARGE

## 2024-03-14 PROCEDURE — RXMED WILLOW AMBULATORY MEDICATION CHARGE

## 2024-03-15 ENCOUNTER — PHARMACY VISIT (OUTPATIENT)
Dept: PHARMACY | Facility: MEDICAL CENTER | Age: 44
End: 2024-03-15
Payer: COMMERCIAL

## 2024-03-24 PROCEDURE — RXMED WILLOW AMBULATORY MEDICATION CHARGE

## 2024-03-28 PROCEDURE — RXMED WILLOW AMBULATORY MEDICATION CHARGE

## 2024-03-30 ENCOUNTER — PHARMACY VISIT (OUTPATIENT)
Dept: PHARMACY | Facility: MEDICAL CENTER | Age: 44
End: 2024-03-30
Payer: COMMERCIAL

## 2024-04-09 ENCOUNTER — PHARMACY VISIT (OUTPATIENT)
Dept: PHARMACY | Facility: MEDICAL CENTER | Age: 44
End: 2024-04-09
Payer: COMMERCIAL

## 2024-04-21 PROCEDURE — RXMED WILLOW AMBULATORY MEDICATION CHARGE: Performed by: PSYCHIATRY & NEUROLOGY

## 2024-04-21 PROCEDURE — RXMED WILLOW AMBULATORY MEDICATION CHARGE

## 2024-04-22 ENCOUNTER — PHARMACY VISIT (OUTPATIENT)
Dept: PHARMACY | Facility: MEDICAL CENTER | Age: 44
End: 2024-04-22
Payer: COMMERCIAL

## 2024-05-01 PROCEDURE — RXMED WILLOW AMBULATORY MEDICATION CHARGE

## 2024-05-02 ENCOUNTER — PHARMACY VISIT (OUTPATIENT)
Dept: PHARMACY | Facility: MEDICAL CENTER | Age: 44
End: 2024-05-02
Payer: COMMERCIAL

## 2024-05-02 PROCEDURE — RXMED WILLOW AMBULATORY MEDICATION CHARGE

## 2024-05-02 RX ORDER — TIRZEPATIDE 15 MG/.5ML
INJECTION, SOLUTION SUBCUTANEOUS
Qty: 2 ML | Refills: 6 | OUTPATIENT
Start: 2024-05-02

## 2024-05-02 RX ORDER — BLOOD SUGAR DIAGNOSTIC
STRIP MISCELLANEOUS
Qty: 50 STRIP | Refills: 12 | OUTPATIENT
Start: 2024-05-02

## 2024-05-15 PROCEDURE — RXMED WILLOW AMBULATORY MEDICATION CHARGE: Performed by: PSYCHIATRY & NEUROLOGY

## 2024-05-16 PROCEDURE — RXMED WILLOW AMBULATORY MEDICATION CHARGE: Performed by: PSYCHIATRY & NEUROLOGY

## 2024-05-16 PROCEDURE — RXMED WILLOW AMBULATORY MEDICATION CHARGE

## 2024-05-17 ENCOUNTER — PHARMACY VISIT (OUTPATIENT)
Dept: PHARMACY | Facility: MEDICAL CENTER | Age: 44
End: 2024-05-17
Payer: COMMERCIAL

## 2024-05-22 PROCEDURE — RXMED WILLOW AMBULATORY MEDICATION CHARGE

## 2024-05-29 ENCOUNTER — PHARMACY VISIT (OUTPATIENT)
Dept: PHARMACY | Facility: MEDICAL CENTER | Age: 44
End: 2024-05-29
Payer: COMMERCIAL

## 2024-05-31 PROCEDURE — RXMED WILLOW AMBULATORY MEDICATION CHARGE

## 2024-06-01 PROCEDURE — RXMED WILLOW AMBULATORY MEDICATION CHARGE: Performed by: PSYCHIATRY & NEUROLOGY

## 2024-06-05 ENCOUNTER — PHARMACY VISIT (OUTPATIENT)
Dept: PHARMACY | Facility: MEDICAL CENTER | Age: 44
End: 2024-06-05
Payer: COMMERCIAL

## 2024-06-11 PROCEDURE — RXMED WILLOW AMBULATORY MEDICATION CHARGE: Performed by: PSYCHIATRY & NEUROLOGY

## 2024-06-11 PROCEDURE — RXMED WILLOW AMBULATORY MEDICATION CHARGE

## 2024-06-13 PROCEDURE — RXMED WILLOW AMBULATORY MEDICATION CHARGE: Performed by: PSYCHIATRY & NEUROLOGY

## 2024-06-14 ENCOUNTER — PHARMACY VISIT (OUTPATIENT)
Dept: PHARMACY | Facility: MEDICAL CENTER | Age: 44
End: 2024-06-14
Payer: COMMERCIAL

## 2024-06-19 PROCEDURE — RXMED WILLOW AMBULATORY MEDICATION CHARGE

## 2024-06-25 ENCOUNTER — PHARMACY VISIT (OUTPATIENT)
Dept: PHARMACY | Facility: MEDICAL CENTER | Age: 44
End: 2024-06-25
Payer: COMMERCIAL

## 2024-06-29 PROCEDURE — RXMED WILLOW AMBULATORY MEDICATION CHARGE

## 2024-06-29 PROCEDURE — RXMED WILLOW AMBULATORY MEDICATION CHARGE: Performed by: PSYCHIATRY & NEUROLOGY

## 2024-07-06 ENCOUNTER — PHARMACY VISIT (OUTPATIENT)
Dept: PHARMACY | Facility: MEDICAL CENTER | Age: 44
End: 2024-07-06
Payer: COMMERCIAL

## 2024-07-11 PROCEDURE — RXMED WILLOW AMBULATORY MEDICATION CHARGE

## 2024-07-13 PROCEDURE — RXMED WILLOW AMBULATORY MEDICATION CHARGE

## 2024-07-14 ENCOUNTER — PHARMACY VISIT (OUTPATIENT)
Dept: PHARMACY | Facility: MEDICAL CENTER | Age: 44
End: 2024-07-14
Payer: COMMERCIAL

## 2024-07-16 ENCOUNTER — PHARMACY VISIT (OUTPATIENT)
Dept: PHARMACY | Facility: MEDICAL CENTER | Age: 44
End: 2024-07-16
Payer: COMMERCIAL

## 2024-07-16 PROCEDURE — RXMED WILLOW AMBULATORY MEDICATION CHARGE

## 2024-07-16 RX ORDER — MECLIZINE HYDROCHLORIDE 25 MG/1
TABLET ORAL
Qty: 42 TABLET | Refills: 1 | OUTPATIENT
Start: 2024-07-16

## 2024-07-22 PROCEDURE — RXMED WILLOW AMBULATORY MEDICATION CHARGE: Performed by: PSYCHIATRY & NEUROLOGY

## 2024-07-22 PROCEDURE — RXMED WILLOW AMBULATORY MEDICATION CHARGE

## 2024-07-23 ENCOUNTER — PHARMACY VISIT (OUTPATIENT)
Dept: PHARMACY | Facility: MEDICAL CENTER | Age: 44
End: 2024-07-23
Payer: COMMERCIAL

## 2024-07-26 PROCEDURE — RXMED WILLOW AMBULATORY MEDICATION CHARGE

## 2024-08-02 ENCOUNTER — PHARMACY VISIT (OUTPATIENT)
Dept: PHARMACY | Facility: MEDICAL CENTER | Age: 44
End: 2024-08-02
Payer: COMMERCIAL

## 2024-08-02 PROCEDURE — RXMED WILLOW AMBULATORY MEDICATION CHARGE

## 2024-08-04 PROCEDURE — RXMED WILLOW AMBULATORY MEDICATION CHARGE

## 2024-08-05 PROCEDURE — RXMED WILLOW AMBULATORY MEDICATION CHARGE: Performed by: PSYCHIATRY & NEUROLOGY

## 2024-08-08 PROCEDURE — RXMED WILLOW AMBULATORY MEDICATION CHARGE

## 2024-08-09 ENCOUNTER — PHARMACY VISIT (OUTPATIENT)
Dept: PHARMACY | Facility: MEDICAL CENTER | Age: 44
End: 2024-08-09
Payer: COMMERCIAL

## 2024-08-21 PROCEDURE — RXMED WILLOW AMBULATORY MEDICATION CHARGE: Performed by: PSYCHIATRY & NEUROLOGY

## 2024-08-21 PROCEDURE — RXMED WILLOW AMBULATORY MEDICATION CHARGE

## 2024-08-22 PROCEDURE — RXMED WILLOW AMBULATORY MEDICATION CHARGE

## 2024-08-24 PROCEDURE — RXMED WILLOW AMBULATORY MEDICATION CHARGE

## 2024-08-25 ENCOUNTER — PHARMACY VISIT (OUTPATIENT)
Dept: PHARMACY | Facility: MEDICAL CENTER | Age: 44
End: 2024-08-25
Payer: COMMERCIAL

## 2024-08-27 ENCOUNTER — HOSPITAL ENCOUNTER (EMERGENCY)
Facility: MEDICAL CENTER | Age: 44
End: 2024-08-27
Attending: STUDENT IN AN ORGANIZED HEALTH CARE EDUCATION/TRAINING PROGRAM
Payer: MEDICARE

## 2024-08-27 ENCOUNTER — PHARMACY VISIT (OUTPATIENT)
Dept: PHARMACY | Facility: MEDICAL CENTER | Age: 44
End: 2024-08-27
Payer: COMMERCIAL

## 2024-08-27 ENCOUNTER — APPOINTMENT (OUTPATIENT)
Dept: RADIOLOGY | Facility: MEDICAL CENTER | Age: 44
End: 2024-08-27
Attending: STUDENT IN AN ORGANIZED HEALTH CARE EDUCATION/TRAINING PROGRAM
Payer: MEDICARE

## 2024-08-27 VITALS
SYSTOLIC BLOOD PRESSURE: 144 MMHG | WEIGHT: 265.21 LBS | TEMPERATURE: 96.6 F | HEIGHT: 66 IN | RESPIRATION RATE: 20 BRPM | DIASTOLIC BLOOD PRESSURE: 87 MMHG | OXYGEN SATURATION: 99 % | HEART RATE: 88 BPM | BODY MASS INDEX: 42.62 KG/M2

## 2024-08-27 DIAGNOSIS — N83.209 HEMORRHAGIC OVARIAN CYST: ICD-10-CM

## 2024-08-27 DIAGNOSIS — N93.9 ABNORMAL UTERINE BLEEDING: ICD-10-CM

## 2024-08-27 DIAGNOSIS — I10 PRIMARY HYPERTENSION: ICD-10-CM

## 2024-08-27 DIAGNOSIS — F40.00 AGORAPHOBIA: ICD-10-CM

## 2024-08-27 LAB
ABO GROUP BLD: NORMAL
ALBUMIN SERPL BCP-MCNC: 3.9 G/DL (ref 3.2–4.9)
ALBUMIN/GLOB SERPL: 1.1 G/DL
ALP SERPL-CCNC: 91 U/L (ref 30–99)
ALT SERPL-CCNC: 9 U/L (ref 2–50)
ANION GAP SERPL CALC-SCNC: 14 MMOL/L (ref 7–16)
APPEARANCE UR: ABNORMAL
APTT PPP: 29.5 SEC (ref 24.7–36)
AST SERPL-CCNC: 9 U/L (ref 12–45)
BACTERIA #/AREA URNS HPF: ABNORMAL /HPF
BASOPHILS # BLD AUTO: 0.7 % (ref 0–1.8)
BASOPHILS # BLD: 0.06 K/UL (ref 0–0.12)
BILIRUB SERPL-MCNC: 0.3 MG/DL (ref 0.1–1.5)
BILIRUB UR QL STRIP.AUTO: ABNORMAL
BLD GP AB SCN SERPL QL: NORMAL
BUN SERPL-MCNC: 8 MG/DL (ref 8–22)
CALCIUM ALBUM COR SERPL-MCNC: 9.1 MG/DL (ref 8.5–10.5)
CALCIUM SERPL-MCNC: 9 MG/DL (ref 8.4–10.2)
CAOX CRY #/AREA URNS HPF: ABNORMAL /HPF
CHLORIDE SERPL-SCNC: 105 MMOL/L (ref 96–112)
CO2 SERPL-SCNC: 19 MMOL/L (ref 20–33)
COLOR UR: ABNORMAL
CREAT SERPL-MCNC: 0.95 MG/DL (ref 0.5–1.4)
EOSINOPHIL # BLD AUTO: 0.25 K/UL (ref 0–0.51)
EOSINOPHIL NFR BLD: 2.8 % (ref 0–6.9)
EPI CELLS #/AREA URNS HPF: ABNORMAL /HPF
ERYTHROCYTE [DISTWIDTH] IN BLOOD BY AUTOMATED COUNT: 45.5 FL (ref 35.9–50)
GFR SERPLBLD CREATININE-BSD FMLA CKD-EPI: 75 ML/MIN/1.73 M 2
GLOBULIN SER CALC-MCNC: 3.6 G/DL (ref 1.9–3.5)
GLUCOSE SERPL-MCNC: 126 MG/DL (ref 65–99)
GLUCOSE UR STRIP.AUTO-MCNC: 100 MG/DL
HCG SERPL QL: NEGATIVE
HCT VFR BLD AUTO: 40.1 % (ref 37–47)
HGB BLD-MCNC: 12.7 G/DL (ref 12–16)
IMM GRANULOCYTES # BLD AUTO: 0.02 K/UL (ref 0–0.11)
IMM GRANULOCYTES NFR BLD AUTO: 0.2 % (ref 0–0.9)
INR PPP: 0.99 (ref 0.87–1.13)
KETONES UR STRIP.AUTO-MCNC: 15 MG/DL
LEUKOCYTE ESTERASE UR QL STRIP.AUTO: NEGATIVE
LYMPHOCYTES # BLD AUTO: 2.12 K/UL (ref 1–4.8)
LYMPHOCYTES NFR BLD: 23.8 % (ref 22–41)
MCH RBC QN AUTO: 28 PG (ref 27–33)
MCHC RBC AUTO-ENTMCNC: 31.7 G/DL (ref 32.2–35.5)
MCV RBC AUTO: 88.5 FL (ref 81.4–97.8)
MICRO URNS: ABNORMAL
MONOCYTES # BLD AUTO: 0.36 K/UL (ref 0–0.85)
MONOCYTES NFR BLD AUTO: 4 % (ref 0–13.4)
MUCOUS THREADS #/AREA URNS HPF: ABNORMAL /HPF
NEUTROPHILS # BLD AUTO: 6.09 K/UL (ref 1.82–7.42)
NEUTROPHILS NFR BLD: 68.5 % (ref 44–72)
NITRITE UR QL STRIP.AUTO: NEGATIVE
NRBC # BLD AUTO: 0 K/UL
NRBC BLD-RTO: 0 /100 WBC (ref 0–0.2)
PH UR STRIP.AUTO: 5.5 [PH] (ref 5–8)
PLATELET # BLD AUTO: 448 K/UL (ref 164–446)
PMV BLD AUTO: 10.3 FL (ref 9–12.9)
POTASSIUM SERPL-SCNC: 4.6 MMOL/L (ref 3.6–5.5)
PROT SERPL-MCNC: 7.5 G/DL (ref 6–8.2)
PROT UR QL STRIP: NEGATIVE MG/DL
PROTHROMBIN TIME: 13.6 SEC (ref 12–14.6)
RBC # BLD AUTO: 4.53 M/UL (ref 4.2–5.4)
RBC # URNS HPF: >150 /HPF
RBC UR QL AUTO: ABNORMAL
RH BLD: NORMAL
SODIUM SERPL-SCNC: 138 MMOL/L (ref 135–145)
SP GR UR STRIP.AUTO: >=1.03
WBC # BLD AUTO: 8.9 K/UL (ref 4.8–10.8)
WBC #/AREA URNS HPF: ABNORMAL /HPF

## 2024-08-27 PROCEDURE — 80053 COMPREHEN METABOLIC PANEL: CPT

## 2024-08-27 PROCEDURE — 85025 COMPLETE CBC W/AUTO DIFF WBC: CPT

## 2024-08-27 PROCEDURE — 86901 BLOOD TYPING SEROLOGIC RH(D): CPT

## 2024-08-27 PROCEDURE — 700105 HCHG RX REV CODE 258: Performed by: STUDENT IN AN ORGANIZED HEALTH CARE EDUCATION/TRAINING PROGRAM

## 2024-08-27 PROCEDURE — 81001 URINALYSIS AUTO W/SCOPE: CPT

## 2024-08-27 PROCEDURE — 85730 THROMBOPLASTIN TIME PARTIAL: CPT

## 2024-08-27 PROCEDURE — 86900 BLOOD TYPING SEROLOGIC ABO: CPT

## 2024-08-27 PROCEDURE — 85610 PROTHROMBIN TIME: CPT

## 2024-08-27 PROCEDURE — 36415 COLL VENOUS BLD VENIPUNCTURE: CPT

## 2024-08-27 PROCEDURE — 99285 EMERGENCY DEPT VISIT HI MDM: CPT

## 2024-08-27 PROCEDURE — 700111 HCHG RX REV CODE 636 W/ 250 OVERRIDE (IP): Mod: JZ | Performed by: STUDENT IN AN ORGANIZED HEALTH CARE EDUCATION/TRAINING PROGRAM

## 2024-08-27 PROCEDURE — 96374 THER/PROPH/DIAG INJ IV PUSH: CPT

## 2024-08-27 PROCEDURE — RXMED WILLOW AMBULATORY MEDICATION CHARGE: Performed by: STUDENT IN AN ORGANIZED HEALTH CARE EDUCATION/TRAINING PROGRAM

## 2024-08-27 PROCEDURE — 86850 RBC ANTIBODY SCREEN: CPT

## 2024-08-27 PROCEDURE — 84703 CHORIONIC GONADOTROPIN ASSAY: CPT

## 2024-08-27 PROCEDURE — 96375 TX/PRO/DX INJ NEW DRUG ADDON: CPT

## 2024-08-27 PROCEDURE — 76830 TRANSVAGINAL US NON-OB: CPT

## 2024-08-27 RX ORDER — HYDROMORPHONE HYDROCHLORIDE 1 MG/ML
0.5 INJECTION, SOLUTION INTRAMUSCULAR; INTRAVENOUS; SUBCUTANEOUS ONCE
Status: COMPLETED | OUTPATIENT
Start: 2024-08-27 | End: 2024-08-27

## 2024-08-27 RX ORDER — TRANEXAMIC ACID 650 MG/1
2 TABLET ORAL 3 TIMES DAILY
Qty: 30 TABLET | Refills: 0 | Status: SHIPPED | OUTPATIENT
Start: 2024-08-27 | End: 2024-08-27

## 2024-08-27 RX ORDER — SODIUM CHLORIDE 9 MG/ML
1000 INJECTION, SOLUTION INTRAVENOUS ONCE
Status: COMPLETED | OUTPATIENT
Start: 2024-08-27 | End: 2024-08-27

## 2024-08-27 RX ORDER — MORPHINE SULFATE 4 MG/ML
4 INJECTION INTRAVENOUS ONCE
Status: COMPLETED | OUTPATIENT
Start: 2024-08-27 | End: 2024-08-27

## 2024-08-27 RX ORDER — ONDANSETRON 2 MG/ML
4 INJECTION INTRAMUSCULAR; INTRAVENOUS ONCE
Status: COMPLETED | OUTPATIENT
Start: 2024-08-27 | End: 2024-08-27

## 2024-08-27 RX ORDER — TRANEXAMIC ACID 650 MG/1
2 TABLET ORAL 3 TIMES DAILY
Qty: 30 TABLET | Refills: 0 | Status: SHIPPED | OUTPATIENT
Start: 2024-08-27

## 2024-08-27 RX ORDER — KETOROLAC TROMETHAMINE 15 MG/ML
15 INJECTION, SOLUTION INTRAMUSCULAR; INTRAVENOUS ONCE
Status: COMPLETED | OUTPATIENT
Start: 2024-08-27 | End: 2024-08-27

## 2024-08-27 RX ORDER — HYDROCODONE BITARTRATE AND ACETAMINOPHEN 5; 325 MG/1; MG/1
1 TABLET ORAL EVERY 6 HOURS PRN
Qty: 20 TABLET | Refills: 0 | Status: SHIPPED | OUTPATIENT
Start: 2024-08-27 | End: 2024-09-01

## 2024-08-27 RX ADMIN — MORPHINE SULFATE 4 MG: 4 INJECTION, SOLUTION INTRAMUSCULAR; INTRAVENOUS at 03:26

## 2024-08-27 RX ADMIN — SODIUM CHLORIDE 1000 ML: 9 INJECTION, SOLUTION INTRAVENOUS at 03:26

## 2024-08-27 RX ADMIN — ONDANSETRON 4 MG: 2 INJECTION INTRAMUSCULAR; INTRAVENOUS at 03:25

## 2024-08-27 RX ADMIN — HYDROMORPHONE HYDROCHLORIDE 0.5 MG: 1 INJECTION, SOLUTION INTRAMUSCULAR; INTRAVENOUS; SUBCUTANEOUS at 03:49

## 2024-08-27 RX ADMIN — KETOROLAC TROMETHAMINE 15 MG: 15 INJECTION, SOLUTION INTRAMUSCULAR; INTRAVENOUS at 04:22

## 2024-08-27 ASSESSMENT — PAIN DESCRIPTION - PAIN TYPE
TYPE: ACUTE PAIN

## 2024-08-27 ASSESSMENT — FIBROSIS 4 INDEX: FIB4 SCORE: 0.26

## 2024-08-27 NOTE — ED TRIAGE NOTES
Chief Complaint   Patient presents with    Vaginal Bleeding     [id number] large clots progressively worsening   Increased SOB w/exertion      Abdominal Pain

## 2024-08-27 NOTE — ED NOTES
Pt medicated for pain, ivf infusing, labs drawn and sent to lab , US tech at bedside, pt placed on monitor, blankets provided for comfort, mother at bedside, will continue to monitor pt

## 2024-08-27 NOTE — ED NOTES
Discharge instructions printed and given to pt. Pt understands discharge and followup. Vss upon discharge, medicated, mother at bedside to provide ride home for pt. Pt ambulated to exit without assist. Pharmacy changed per pt request as well as bloodless allergy. Pt verbalizes she will consent to blood transfusion in the event she is incapacitated and needs a transfusion to save her life.

## 2024-08-27 NOTE — DISCHARGE INSTRUCTIONS
Please continue to try to make an OB/GYN appointment.  You may consider [facility].  I have placed a referral and the [facility] referral center may reach out to you to help with this placement.  Please take the TXA as discussed.  You need to know what the risk is of developing a blood clot in the leg or lungs.  If you are having episodes where you almost passed out or continue to feel poorly please return to the ER especially if you are unable to see OB/GYN.

## 2024-08-27 NOTE — ED PROVIDER NOTES
ED Provider Note    CHIEF COMPLAINT  Chief Complaint   Patient presents with    Vaginal Bleeding     [id number] large clots progressively worsening   Increased SOB w/exertion      Abdominal Pain       EXTERNAL RECORDS REVIEWED  External ED Note visit 6 of 2023 for weakness and discharge from the ER    HPI/ROS  LIMITATION TO HISTORY   Select: : None  OUTSIDE HISTORIAN(S):  Family Jennifer Huffman is a 44 y.o. female who presents due to vaginal bleeding and abdominal pain.  Patient notes she has been having vaginal bleeding for 39 days passing large clots.  She has daily bleeding.  She will saturate a pad every 2-4 hours.  She notes that for 2 months she did not have a menstrual cycle preceding this bleeding episode.  She typically was having normal monthly menstrual cycles prior to that.  She is not on hormones or OCPs.  She notes she has delayed coming in due to her extreme agoraphobia and PTSD.  For the past 3 days her pain has been severe which prompts her to come in today with the coaxing of her mother.  She has right sided abdominal pain that has been there for the past month but seem to worsen 3 days ago.  She has not had any fevers, no dysuria or hematuria, no change in bowel habits.  She is not finding relief with Tylenol.  She is attempting to find an OB/GYN but having significant issues and relates it is due to her Medicare/Medicaid insurance.    PAST MEDICAL HISTORY   has a past medical history of Heart attack (HCC), Hypertension, and Kidney stones.    SURGICAL HISTORY  patient denies any surgical history    FAMILY HISTORY  No family history on file.    SOCIAL HISTORY  Social History     Tobacco Use    Smoking status: Never    Smokeless tobacco: Never   Substance and Sexual Activity    Alcohol use: No    Drug use: No    Sexual activity: Not on file       CURRENT MEDICATIONS  Home Medications    **Home medications have not yet been reviewed for this encounter**       Audit from Redirected  "Encounters    **Home medications have not yet been reviewed for this encounter**         ALLERGIES  Allergies   Allergen Reactions    Bloodless     Latex Rash    Tramadol Unspecified     Makes her sick         PHYSICAL EXAM  VITAL SIGNS: BP (!) 142/95   Pulse (!) 115   Temp 35.9 °C (96.6 °F) (Temporal)   Resp 20   Ht 1.676 m (5' 6\")   Wt 120 kg (265 lb 3.4 oz)   LMP  (Exact Date) Comment: current  SpO2 97%   BMI 42.81 kg/m²    Constitutional: Awake and alert. No acute distress. Obese habitus.  Head: NCAT.  HEENT: Normal Conjunctiva. PERRLA.  Neck: Grossly normal range of motion. Airway midline.  Cardiovascular: Normal heart rate, Normal rhythm.  Thorax & Lungs: No respiratory distress. Clear to Auscultation bilaterally.  Abdomen: Normal inspection. Nontender. Nondistended. No rebound or guarding.  Skin: No obvious rash.  Back: No tenderness, No CVA tenderness.   Musculoskeletal: No obvious deformity. Moves all extremities Well.  Neurologic: A&Ox3.   Psychiatric: Mood and affect are appropriate for situation.      EKG/LABS  Results for orders placed or performed during the hospital encounter of 08/27/24   CBC WITH DIFFERENTIAL   Result Value Ref Range    WBC 8.9 4.8 - 10.8 K/uL    RBC 4.53 4.20 - 5.40 M/uL    Hemoglobin 12.7 12.0 - 16.0 g/dL    Hematocrit 40.1 37.0 - 47.0 %    MCV 88.5 81.4 - 97.8 fL    MCH 28.0 27.0 - 33.0 pg    MCHC 31.7 (L) 32.2 - 35.5 g/dL    RDW 45.5 35.9 - 50.0 fL    Platelet Count 448 (H) 164 - 446 K/uL    MPV 10.3 9.0 - 12.9 fL    Neutrophils-Polys 68.50 44.00 - 72.00 %    Lymphocytes 23.80 22.00 - 41.00 %    Monocytes 4.00 0.00 - 13.40 %    Eosinophils 2.80 0.00 - 6.90 %    Basophils 0.70 0.00 - 1.80 %    Immature Granulocytes 0.20 0.00 - 0.90 %    Nucleated RBC 0.00 0.00 - 0.20 /100 WBC    Neutrophils (Absolute) 6.09 1.82 - 7.42 K/uL    Lymphs (Absolute) 2.12 1.00 - 4.80 K/uL    Monos (Absolute) 0.36 0.00 - 0.85 K/uL    Eos (Absolute) 0.25 0.00 - 0.51 K/uL    Baso (Absolute) 0.06 " 0.00 - 0.12 K/uL    Immature Granulocytes (abs) 0.02 0.00 - 0.11 K/uL    NRBC (Absolute) 0.00 K/uL   COMP METABOLIC PANEL   Result Value Ref Range    Sodium 138 135 - 145 mmol/L    Potassium 4.6 3.6 - 5.5 mmol/L    Chloride 105 96 - 112 mmol/L    Co2 19 (L) 20 - 33 mmol/L    Anion Gap 14.0 7.0 - 16.0    Glucose 126 (H) 65 - 99 mg/dL    Bun 8 8 - 22 mg/dL    Creatinine 0.95 0.50 - 1.40 mg/dL    Calcium 9.0 8.4 - 10.2 mg/dL    Correct Calcium 9.1 8.5 - 10.5 mg/dL    AST(SGOT) 9 (L) 12 - 45 U/L    ALT(SGPT) 9 2 - 50 U/L    Alkaline Phosphatase 91 30 - 99 U/L    Total Bilirubin 0.3 0.1 - 1.5 mg/dL    Albumin 3.9 3.2 - 4.9 g/dL    Total Protein 7.5 6.0 - 8.2 g/dL    Globulin 3.6 (H) 1.9 - 3.5 g/dL    A-G Ratio 1.1 g/dL   PROTHROMBIN TIME (INR)   Result Value Ref Range    PT 13.6 12.0 - 14.6 sec    INR 0.99 0.87 - 1.13   APTT   Result Value Ref Range    APTT 29.5 24.7 - 36.0 sec   URINALYSIS,CULTURE IF INDICATED    Specimen: Urine, Clean Catch   Result Value Ref Range    Color Dark Yellow     Character Hazy (A)     Specific Gravity >=1.030 <1.035    Ph 5.5 5.0 - 8.0    Glucose 100 (A) Negative mg/dL    Ketones 15 (A) Negative mg/dL    Protein Negative Negative mg/dL    Bilirubin Moderate (A) Negative    Nitrite Negative Negative    Leukocyte Esterase Negative Negative    Occult Blood Large (A) Negative    Micro Urine Req Microscopic    BETA-HCG QUALITATIVE SERUM   Result Value Ref Range    Beta-Hcg Qualitative Serum Negative Negative   URINE MICROSCOPIC (W/UA)   Result Value Ref Range    WBC 0-2 /hpf    RBC >150 (A) /hpf    Bacteria Few (A) None /hpf    Epithelial Cells Few Few /hpf    Mucous Threads Few /hpf    Ca Oxalate Crystal Few /hpf   ESTIMATED GFR   Result Value Ref Range    GFR (CKD-EPI) 75 >60 mL/min/1.73 m 2     RADIOLOGY/PROCEDURES   I have independently interpreted the diagnostic imaging associated with this visit and am waiting the final reading from the radiologist.   My preliminary interpretation is as  "follows:   Nia hemorrhagic cyst    Radiologist interpretation:  US-PELVIC COMPLETE (TRANSABDOMINAL/TRANSVAGINAL) (COMBO)   Final Result         1.  Right ovarian cyst with septation, could represent hemorrhagic cyst, otherwise indeterminate, recommend six-week follow-up pelvic ultrasound for repeat characterization and evaluation of stability.   2.  Markedly thickened endometrial stripe, could represent proliferative changes, consider endometrial pathology. Can be reevaluated at 6 weeks at opposite menstrual phase for repeat characterization.   3.  Nabothian cysts          COURSE & MEDICAL DECISION MAKING    ASSESSMENT, COURSE AND PLAN  Care Narrative:   44-year-old female history of severe agoraphobia and PTSD here for over a month of vaginal bleeding with worsened pain 3 days ago  Afebrile hypertensive.  Tachycardic with heart rate from  possibly due to bleeding, pain or underlying anxiety state  On exam she is not in severe distress.  Soft nontender abdomen with no rebound or guarding.  Differential includes AUB, uterine malignancy, menstrual cycle, blood loss anemia, fibroid,  Patient has reported \"bloodless\" as an identifier in her chart.  Given her tachycardia and presenting complaint I am concerned that she may be experiencing life-threatening bleeding.  When asked about her bloodless status it is not due to Congregational reasons but rather due to her anxiety and concerns about receiving \"bad or infected blood\".  Will proceed with labs to assess for anemia, ultrasound to assess for the above differential  She was given IV morphine and fluids while awaiting labs.  Patient had continued pain and was given Dilaudid.  Thankfully she is not anemic here with hemoglobin of 12.7.  She was able to show me labs from a month ago on her phone that had a hemoglobin of 13.5.  She does take iron supplementation as well  Labs otherwise without acute findings.  Ultrasound shows likely hemorrhagic cyst but indeterminate " with need for 6-week follow-up ultrasound.  Also with thickened endometrial stripe and in the context of over a month of bleeding may be malignancy.  This also needs 6-week follow-up.  Patient not exhibiting life-threatening bleeding at this time.  Hemorrhagic cyst may explain her change in pain about 3 days ago.  Thickened endometrium is concerning for malignancy and patient was advised of this.  I will refer her to OB/GYN.  I also suggest she look at [facility].  She has been having significant trouble due to her insurance she states.  I am also concerned she may not follow-up given her agoraphobia.  Given this reality I have discussed the risks and benefits of medication to help with the bleeding.  In the setting of possible endometrial cancer will avoid hormonal treatment.  Did discuss that she should start taking NSAIDs for bleeding.  Will trial TXA over the next 5 days for bleeding.  She has no VTE history or risk factors.  Comparatively in addition to concern for cancer, hormonal treatment would be contraindicated given hypertension and chart history of prior MI.  Patient was counseled on the risk for VTE on tranexamic acid.  He was instructed to return for leg swelling or pain.  Advised her to return for worsening bleeding, syncope or near syncope, Complication from taking TXA or inability to follow OB/GYN.    Hydration: Based on the patient's presentation of Tachycardia the patient was given IV fluids. IV Hydration was used because oral hydration was not as rapid as required. Upon recheck following hydration, the patient was improved.    ADDITIONAL PROBLEMS MANAGED    AUB  Hemorrhagic cyst  Thickened endometrium  Agoraphobia    DISPOSITION AND DISCUSSIONS  I have discussed management of the patient with the following physicians and JULIET's:  none    Discussion of management with other [facility] or appropriate source(s): None     Escalation of care considered, and ultimately not performed:IV fluids, Laboratory  analysis, diagnostic imaging, and acute inpatient care management, however at this time, the patient is most appropriate for outpatient management    Barriers to care at this time, including but not limited to:  Agoraphobia/underlying anxiety .     Decision tools and prescription drugs considered including, but not limited to:  none .    FINAL DIAGNOSIS  1. Abnormal uterine bleeding Acute   2. Hemorrhagic ovarian cyst Acute   3. Primary hypertension    4. Agoraphobia Chronic        Electronically signed by: Fide Lawson D.O., 8/27/2024 2:47 AM

## 2024-08-28 NOTE — ED NOTES
Received a call from patient wanting to get a prescription faxed for antibiotics for her previous visit for an ovarian cyst. Patient states that she thinks her cyst has burst and has a fever, needs antibiotics and she would like Dr Lawson to send a script to her pharmacy. I told patient that her instructions say to make an appointment with ob/gyn for follow-up. Patient stated she did but its weeks from now. I told patient if your symptoms are worsening you will need to be seen and to go to [facility] for gynecology and for a further work up related to your pain and fever. Patient told me yeah I am not doing that, I just wanted you to call something in for me.

## 2024-08-29 PROCEDURE — RXMED WILLOW AMBULATORY MEDICATION CHARGE

## 2024-09-02 ENCOUNTER — HOSPITAL ENCOUNTER (EMERGENCY)
Facility: MEDICAL CENTER | Age: 44
End: 2024-09-02
Attending: STUDENT IN AN ORGANIZED HEALTH CARE EDUCATION/TRAINING PROGRAM
Payer: MEDICARE

## 2024-09-02 ENCOUNTER — APPOINTMENT (OUTPATIENT)
Dept: RADIOLOGY | Facility: MEDICAL CENTER | Age: 44
End: 2024-09-02
Attending: STUDENT IN AN ORGANIZED HEALTH CARE EDUCATION/TRAINING PROGRAM
Payer: MEDICARE

## 2024-09-02 VITALS
DIASTOLIC BLOOD PRESSURE: 98 MMHG | RESPIRATION RATE: 20 BRPM | SYSTOLIC BLOOD PRESSURE: 155 MMHG | TEMPERATURE: 97.7 F | WEIGHT: 267.64 LBS | BODY MASS INDEX: 43.01 KG/M2 | HEART RATE: 100 BPM | HEIGHT: 66 IN | OXYGEN SATURATION: 96 %

## 2024-09-02 DIAGNOSIS — N93.9 ABNORMAL UTERINE BLEEDING: ICD-10-CM

## 2024-09-02 LAB
ALBUMIN SERPL BCP-MCNC: 3.7 G/DL (ref 3.2–4.9)
ALBUMIN/GLOB SERPL: 1.1 G/DL
ALP SERPL-CCNC: 88 U/L (ref 30–99)
ALT SERPL-CCNC: 8 U/L (ref 2–50)
ANION GAP SERPL CALC-SCNC: 12 MMOL/L (ref 7–16)
APPEARANCE UR: CLEAR
AST SERPL-CCNC: 10 U/L (ref 12–45)
BACTERIA #/AREA URNS HPF: NEGATIVE /HPF
BASOPHILS # BLD AUTO: 1.4 % (ref 0–1.8)
BASOPHILS # BLD: 0.08 K/UL (ref 0–0.12)
BILIRUB SERPL-MCNC: <0.2 MG/DL (ref 0.1–1.5)
BILIRUB UR QL STRIP.AUTO: NEGATIVE
BUN SERPL-MCNC: 12 MG/DL (ref 8–22)
CALCIUM ALBUM COR SERPL-MCNC: 8.9 MG/DL (ref 8.5–10.5)
CALCIUM SERPL-MCNC: 8.7 MG/DL (ref 8.5–10.5)
CHLORIDE SERPL-SCNC: 109 MMOL/L (ref 96–112)
CO2 SERPL-SCNC: 18 MMOL/L (ref 20–33)
COLOR UR: ABNORMAL
CREAT SERPL-MCNC: 0.83 MG/DL (ref 0.5–1.4)
EOSINOPHIL # BLD AUTO: 0.21 K/UL (ref 0–0.51)
EOSINOPHIL NFR BLD: 3.6 % (ref 0–6.9)
EPI CELLS #/AREA URNS HPF: ABNORMAL /HPF
ERYTHROCYTE [DISTWIDTH] IN BLOOD BY AUTOMATED COUNT: 46.4 FL (ref 35.9–50)
GFR SERPLBLD CREATININE-BSD FMLA CKD-EPI: 88 ML/MIN/1.73 M 2
GLOBULIN SER CALC-MCNC: 3.3 G/DL (ref 1.9–3.5)
GLUCOSE SERPL-MCNC: 119 MG/DL (ref 65–99)
GLUCOSE UR STRIP.AUTO-MCNC: NEGATIVE MG/DL
HCG SERPL QL: NEGATIVE
HCT VFR BLD AUTO: 37.6 % (ref 37–47)
HGB BLD-MCNC: 11.7 G/DL (ref 12–16)
HYALINE CASTS #/AREA URNS LPF: ABNORMAL /LPF
IMM GRANULOCYTES # BLD AUTO: 0.01 K/UL (ref 0–0.11)
IMM GRANULOCYTES NFR BLD AUTO: 0.2 % (ref 0–0.9)
KETONES UR STRIP.AUTO-MCNC: ABNORMAL MG/DL
LEUKOCYTE ESTERASE UR QL STRIP.AUTO: NEGATIVE
LIPASE SERPL-CCNC: 25 U/L (ref 11–82)
LYMPHOCYTES # BLD AUTO: 2.22 K/UL (ref 1–4.8)
LYMPHOCYTES NFR BLD: 37.9 % (ref 22–41)
MCH RBC QN AUTO: 27.9 PG (ref 27–33)
MCHC RBC AUTO-ENTMCNC: 31.1 G/DL (ref 32.2–35.5)
MCV RBC AUTO: 89.5 FL (ref 81.4–97.8)
MICRO URNS: ABNORMAL
MONOCYTES # BLD AUTO: 0.2 K/UL (ref 0–0.85)
MONOCYTES NFR BLD AUTO: 3.4 % (ref 0–13.4)
NEUTROPHILS # BLD AUTO: 3.14 K/UL (ref 1.82–7.42)
NEUTROPHILS NFR BLD: 53.5 % (ref 44–72)
NITRITE UR QL STRIP.AUTO: NEGATIVE
NRBC # BLD AUTO: 0 K/UL
NRBC BLD-RTO: 0 /100 WBC (ref 0–0.2)
PH UR STRIP.AUTO: 5 [PH] (ref 5–8)
PLATELET # BLD AUTO: 451 K/UL (ref 164–446)
PMV BLD AUTO: 9.8 FL (ref 9–12.9)
POTASSIUM SERPL-SCNC: 4.5 MMOL/L (ref 3.6–5.5)
PROT SERPL-MCNC: 7 G/DL (ref 6–8.2)
PROT UR QL STRIP: 30 MG/DL
RBC # BLD AUTO: 4.2 M/UL (ref 4.2–5.4)
RBC # URNS HPF: >150 /HPF
RBC UR QL AUTO: ABNORMAL
SODIUM SERPL-SCNC: 139 MMOL/L (ref 135–145)
SP GR UR STRIP.AUTO: 1.04
UROBILINOGEN UR STRIP.AUTO-MCNC: 1 MG/DL
WBC # BLD AUTO: 5.9 K/UL (ref 4.8–10.8)
WBC #/AREA URNS HPF: ABNORMAL /HPF

## 2024-09-02 PROCEDURE — 96374 THER/PROPH/DIAG INJ IV PUSH: CPT

## 2024-09-02 PROCEDURE — 700111 HCHG RX REV CODE 636 W/ 250 OVERRIDE (IP): Mod: JZ,UD | Performed by: STUDENT IN AN ORGANIZED HEALTH CARE EDUCATION/TRAINING PROGRAM

## 2024-09-02 PROCEDURE — 84703 CHORIONIC GONADOTROPIN ASSAY: CPT

## 2024-09-02 PROCEDURE — 85025 COMPLETE CBC W/AUTO DIFF WBC: CPT

## 2024-09-02 PROCEDURE — 36415 COLL VENOUS BLD VENIPUNCTURE: CPT

## 2024-09-02 PROCEDURE — 99285 EMERGENCY DEPT VISIT HI MDM: CPT

## 2024-09-02 PROCEDURE — 81001 URINALYSIS AUTO W/SCOPE: CPT

## 2024-09-02 PROCEDURE — RXMED WILLOW AMBULATORY MEDICATION CHARGE: Performed by: STUDENT IN AN ORGANIZED HEALTH CARE EDUCATION/TRAINING PROGRAM

## 2024-09-02 PROCEDURE — 83690 ASSAY OF LIPASE: CPT

## 2024-09-02 PROCEDURE — 80053 COMPREHEN METABOLIC PANEL: CPT

## 2024-09-02 PROCEDURE — 96375 TX/PRO/DX INJ NEW DRUG ADDON: CPT

## 2024-09-02 RX ORDER — MORPHINE SULFATE 4 MG/ML
4 INJECTION INTRAVENOUS ONCE
Status: COMPLETED | OUTPATIENT
Start: 2024-09-02 | End: 2024-09-02

## 2024-09-02 RX ORDER — MEDROXYPROGESTERONE ACETATE 10 MG
10 TABLET ORAL DAILY
Qty: 30 TABLET | Refills: 1 | Status: SHIPPED | OUTPATIENT
Start: 2024-09-02 | End: 2024-09-24 | Stop reason: SDUPTHER

## 2024-09-02 RX ORDER — SODIUM CHLORIDE, SODIUM LACTATE, POTASSIUM CHLORIDE, CALCIUM CHLORIDE 600; 310; 30; 20 MG/100ML; MG/100ML; MG/100ML; MG/100ML
1000 INJECTION, SOLUTION INTRAVENOUS ONCE
Status: DISCONTINUED | OUTPATIENT
Start: 2024-09-02 | End: 2024-09-02

## 2024-09-02 RX ORDER — MEDROXYPROGESTERONE ACETATE 10 MG
10 TABLET ORAL DAILY
Qty: 30 TABLET | Refills: 0 | Status: SHIPPED | OUTPATIENT
Start: 2024-09-02 | End: 2024-09-02

## 2024-09-02 RX ORDER — ONDANSETRON 2 MG/ML
4 INJECTION INTRAMUSCULAR; INTRAVENOUS ONCE
Status: COMPLETED | OUTPATIENT
Start: 2024-09-02 | End: 2024-09-02

## 2024-09-02 RX ADMIN — MORPHINE SULFATE 4 MG: 4 INJECTION, SOLUTION INTRAMUSCULAR; INTRAVENOUS at 21:47

## 2024-09-02 RX ADMIN — ONDANSETRON 4 MG: 2 INJECTION INTRAMUSCULAR; INTRAVENOUS at 21:47

## 2024-09-02 ASSESSMENT — PAIN DESCRIPTION - PAIN TYPE: TYPE: ACUTE PAIN

## 2024-09-02 ASSESSMENT — FIBROSIS 4 INDEX: FIB4 SCORE: 0.3

## 2024-09-03 ENCOUNTER — PHARMACY VISIT (OUTPATIENT)
Dept: PHARMACY | Facility: MEDICAL CENTER | Age: 44
End: 2024-09-03
Payer: COMMERCIAL

## 2024-09-03 NOTE — ED NOTES
Bedside report from TRACY Diaz. Pt on rebecca in lowest, locked position, on RA, and continuous pulse ox monitoring. Fall precautions in place. Pt updated on plan of care. No needs at this time. Call light within reach.

## 2024-09-03 NOTE — ED TRIAGE NOTES
Jennifer Huffman  44 y.o.  female  Chief Complaint   Patient presents with    Vaginal Bleeding     Pt reports she is using 10 tampons every few hours, changing her pad every hour. Pt was seen for vaginal bleeding 3 days ago & given TXA, dx with ruptured ovarian cyst. Pt reports no improvement in bleeding, states it it worse.    Fever     Pt reports temp 103 at home 2 days ago, none today.      Pt ambulatory to triage with mom. Labs ordered. Patient educated on triage process, to alert staff if any changes in condition.

## 2024-09-03 NOTE — DISCHARGE INSTRUCTIONS
You were seen for vaginal bleeding. I discussed with OBGYN Dr Trina Miller who recommends starting Provera 10 mg daily. I have sent a prescription for this. You must follow up with OBGYN for further testing. Call them tomorrow to schedule follow up. Return for any severe bleeding, passing out, worsening pain or any other concerns.

## 2024-09-03 NOTE — ED NOTES
Pt provided discharge instructions, and prescription. Pt verbalized understanding of all instructions. Pt ambulatory to lobby w/ steady gait.

## 2024-09-03 NOTE — ED PROVIDER NOTES
ED Provider Note    CHIEF COMPLAINT  Chief Complaint   Patient presents with    Vaginal Bleeding     Pt reports she is using 10 tampons every few hours, changing her pad every hour. Pt was seen for vaginal bleeding 3 days ago & given TXA, dx with ruptured ovarian cyst. Pt reports no improvement in bleeding, states it it worse.    Fever     Pt reports temp 103 at home 2 days ago, none today.        EXTERNAL RECORDS REVIEWED  Outpatient Notes patient follows with Ortho for adhesive capsulitis of the right shoulder.  She was seen at [facility] emergency room August 27, 2024 for vaginal bleeding and abdominal pain.  She has been bleeding vaginally for 39 days.  Her hemoglobin at that time was 12.7.  She had an ultrasound done which showed cyst of the right ovary with thickened endometrial stripe.  She was discharged on oral TXA    HPI/ROS  LIMITATION TO HISTORY   Select: : None  OUTSIDE HISTORIAN(S):  None    Jennifer Huffman is a 44 y.o. female who presents for evaluation of vaginal bleeding that has been ongoing for the past 43 days.  She notes that she is seen in the emergency room a few days ago for similar symptoms.  She had workup done and she was ultimately discharged on oral TXA.  She has finished the TXA but has continued to bleed therefore she called the pharmacist who recommended that she come back to the emergency room.  She states that since being on the TXA, her vaginal bleeding has increased.  She states that she uses approximately 10 tampons a day and is also using pads.  She states that previously was she was only using approximately 5 tampons a day.  She is concerned and she had an ultrasound done the other day that showed that she had a thickened endometrial stripe therefore she is concerned that she may have cancer.  She also had a fever 2 days ago but states that she has not had a fever since then.  She states that her temperature was 103.  She denies any nausea, vomiting, diarrhea,  "dysuria, cough, shortness of breath. She does have some epigastric abdominal pain.  She is not sexually active.  She denies any history of cancer.  She is scheduled to follow-up with OB/GYN October 2024.  She is not on any birth control pills.    PAST MEDICAL HISTORY   has a past medical history of Heart attack (HCC), Hypertension, and Kidney stones.    SURGICAL HISTORY  patient denies any surgical history    FAMILY HISTORY  No family history on file.    SOCIAL HISTORY  Social History     Tobacco Use    Smoking status: Never    Smokeless tobacco: Never   Substance and Sexual Activity    Alcohol use: No    Drug use: No    Sexual activity: Not on file       CURRENT MEDICATIONS  Home Medications    **Home medications have not yet been reviewed for this encounter**         ALLERGIES  Allergies   Allergen Reactions    Latex Rash    Tramadol Unspecified     Makes her sick         PHYSICAL EXAM  VITAL SIGNS: BP (!) 157/99   Pulse (!) 114   Temp 36.1 °C (96.9 °F) (Temporal)   Resp (!) 22   Ht 1.676 m (5' 6\")   Wt 121 kg (267 lb 10.2 oz)   SpO2 96%   BMI 43.20 kg/m²      Constitutional: Well developed, Well nourished, No acute distress, Non-toxic appearance.   HEENT: Normocephalic, Atraumatic,  external ears normal, pharynx pink,  Mucous  Membranes moist, No rhinorrhea or mucosal edema  Eyes: PERRL, EOMI, Conjunctiva normal, No discharge.   Neck: Normal range of motion, No tenderness, Supple, No stridor.   Cardiovascular: Regular Rate and Rhythm, No murmurs,  rubs, or gallops.   Thorax & Lungs: Lungs clear to auscultation bilaterally, No respiratory distress, No wheezes, rhales or rhonchi, No chest wall tenderness.   Abdomen: unable to elicit abdominal tenderenss, Soft, non tender, non distended,  No pulsatile masses., no rebound guarding or peritoneal signs.   Skin: Warm, Dry, No erythema, No rash,   Back:  No CVA tenderness,  No spinal tenderness, bony crepitance step offs or instability.   Extremities: Equal, " intact distal pulses, No cyanosis, clubbing or edema,  No tenderness.   Pelvic: With female nurse chaperone, normal external female genitalia, slight amount of oozing blood from the cervical os however no hemorrhage, no major blood clots noted  Musculoskeletal: Good range of motion in all major joints. No tenderness to palpation or major deformities noted.   Neurologic: Alert & oriented No focal deficits noted.  Psychiatric: Affect normal, Judgment normal, Mood normal.      EKG/LABS  Results for orders placed or performed during the hospital encounter of 09/02/24   CBC WITH DIFFERENTIAL   Result Value Ref Range    WBC 5.9 4.8 - 10.8 K/uL    RBC 4.20 4.20 - 5.40 M/uL    Hemoglobin 11.7 (L) 12.0 - 16.0 g/dL    Hematocrit 37.6 37.0 - 47.0 %    MCV 89.5 81.4 - 97.8 fL    MCH 27.9 27.0 - 33.0 pg    MCHC 31.1 (L) 32.2 - 35.5 g/dL    RDW 46.4 35.9 - 50.0 fL    Platelet Count 451 (H) 164 - 446 K/uL    MPV 9.8 9.0 - 12.9 fL    Neutrophils-Polys 53.50 44.00 - 72.00 %    Lymphocytes 37.90 22.00 - 41.00 %    Monocytes 3.40 0.00 - 13.40 %    Eosinophils 3.60 0.00 - 6.90 %    Basophils 1.40 0.00 - 1.80 %    Immature Granulocytes 0.20 0.00 - 0.90 %    Nucleated RBC 0.00 0.00 - 0.20 /100 WBC    Neutrophils (Absolute) 3.14 1.82 - 7.42 K/uL    Lymphs (Absolute) 2.22 1.00 - 4.80 K/uL    Monos (Absolute) 0.20 0.00 - 0.85 K/uL    Eos (Absolute) 0.21 0.00 - 0.51 K/uL    Baso (Absolute) 0.08 0.00 - 0.12 K/uL    Immature Granulocytes (abs) 0.01 0.00 - 0.11 K/uL    NRBC (Absolute) 0.00 K/uL   COMP METABOLIC PANEL   Result Value Ref Range    Sodium 139 135 - 145 mmol/L    Potassium 4.5 3.6 - 5.5 mmol/L    Chloride 109 96 - 112 mmol/L    Co2 18 (L) 20 - 33 mmol/L    Anion Gap 12.0 7.0 - 16.0    Glucose 119 (H) 65 - 99 mg/dL    Bun 12 8 - 22 mg/dL    Creatinine 0.83 0.50 - 1.40 mg/dL    Calcium 8.7 8.5 - 10.5 mg/dL    Correct Calcium 8.9 8.5 - 10.5 mg/dL    AST(SGOT) 10 (L) 12 - 45 U/L    ALT(SGPT) 8 2 - 50 U/L    Alkaline Phosphatase 88 30 -  99 U/L    Total Bilirubin <0.2 0.1 - 1.5 mg/dL    Albumin 3.7 3.2 - 4.9 g/dL    Total Protein 7.0 6.0 - 8.2 g/dL    Globulin 3.3 1.9 - 3.5 g/dL    A-G Ratio 1.1 g/dL   LIPASE   Result Value Ref Range    Lipase 25 11 - 82 U/L   HCG QUAL SERUM   Result Value Ref Range    Beta-Hcg Qualitative Serum Negative Negative   URINALYSIS,CULTURE IF INDICATED    Specimen: Urine   Result Value Ref Range    Color DK Yellow     Character Clear     Specific Gravity 1.038 <1.035    Ph 5.0 5.0 - 8.0    Glucose Negative Negative mg/dL    Ketones Trace (A) Negative mg/dL    Protein 30 (A) Negative mg/dL    Bilirubin Negative Negative    Urobilinogen, Urine 1.0 Negative    Nitrite Negative Negative    Leukocyte Esterase Negative Negative    Occult Blood Large (A) Negative    Micro Urine Req Microscopic    ESTIMATED GFR   Result Value Ref Range    GFR (CKD-EPI) 88 >60 mL/min/1.73 m 2   URINE MICROSCOPIC (W/UA)   Result Value Ref Range    WBC 0-2 /hpf    RBC >150 (A) /hpf    Bacteria Negative None /hpf    Epithelial Cells Few /hpf    Hyaline Cast 3-5 (A) /lpf       I have independently interpreted this EKG      COURSE & MEDICAL DECISION MAKING    ASSESSMENT, COURSE AND PLAN  Care Narrative:   This is a 44-year-old female with history as noted above is presenting for evaluation of vaginal bleeding that has been ongoing for over 40 days.  She has been seen in the emergency room a few days ago, treated with oral TXA.  Returns today as her bleeding has continued despite being done with the TXA.  On arrival her vital signs are stable.  She was mildly tachycardic in triage but this was not present when she was roomed.  She is not hypotensive.  Pelvic exam was performed and she does have vaginal bleeding but there is no significant hemorrhage noted.    Testing was done and she is not pregnant.  Also consider abnormal uterine bleeding, perimenopausal changes, malignancy.  She already had ultrasound done a few days ago which showed thick  endometrial stripe and possible hemorrhagic ovarian cyst but there is no evidence of fibroids.  She has no lower abdominal tenderness palpation and is not sexually active, doubt PID.  She does have complaints of epigastric abdominal pain but there is no tenderness on exam therefore doubt acute cholecystitis.    Labs performed and her hemoglobin is 11.7.  This is compared to 12.71-week ago.  UA with no signs of infection.  Electrolytes within normal limits.  Kidney function is normal.    Patient notes that 2 days ago she did have a fever but she has not noted to have a fever while in the emergency room.  Her workup is reassuring at this point.  We did discuss doing a CT scan of her abdomen to further evaluate however she is not having any pain and is reassured that her white count is normal and would prefer to defer CT at this point.    I did discuss with GYN on-call, Dr. Trina Miller, regarding hormonal therapy.  She recommends starting the patient on Provera which is safe even if this proves to be a malignant process.  The patient has no indication of life-threatening bleeding at this time given only mild decrease in hemoglobin, no significant hemorrhage on pelvic exam and she is hemodynamically stable.  She is not on any blood thinners.  I stressed the patient that she must follow-up outpatient for further workup to determine what is causing the abnormal vaginal bleeding.  She will return for any significant vaginal bleeding, shortness of breath, passing out or any other concerns.  Patient and her mother agreeable to discharge plan no further questions.    Of note, she has follow-up with [facility] in October.  Patient concerned about waiting that long to follow-up.  On-call GYN works with different group therefore patient was provided information for their group to see if she can get sooner appointment.  She was also encouraged to call [facility] to see if she can be placed on a cancellation  list.            ADDITIONAL PROBLEMS MANAGED  None    DISPOSITION AND DISCUSSIONS  I have discussed management of the patient with the following physicians and JULIET's:    OBGYN - Dr. Trina Miller    Discussion of management with other Hospitals in [place] or appropriate source(s): None     Escalation of care considered, and ultimately not performed:diagnostic imaging and acute inpatient care management, however at this time, the patient is most appropriate for outpatient management    Barriers to care at this time, including but not limited to:  None .     Decision tools and prescription drugs considered including, but not limited to:  None .    The patient will return for new or worsening symptoms and is stable at the time of discharge.    The patient is referred to a primary physician for blood pressure management, diabetic screening, and for all other preventative health concerns.      DISPOSITION:  Patient will be discharged home in stable condition.    FOLLOW UP:  Caitlin Vigil M.D.  [address]   [address] [address]  [phone]          [facility], Emergency Dept  [facility] [facility] [id number] [phone]          OUTPATIENT MEDICATIONS:  Discharge Medication List as of 9/2/2024 10:44 PM            FINAL DIAGNOSIS  1. Abnormal uterine bleeding         Electronically signed by: Teresa Rocha M.D., 9/2/2024 8:43 PM

## 2024-09-07 ENCOUNTER — PHARMACY VISIT (OUTPATIENT)
Dept: PHARMACY | Facility: MEDICAL CENTER | Age: 44
End: 2024-09-07
Payer: COMMERCIAL

## 2024-09-07 PROCEDURE — RXMED WILLOW AMBULATORY MEDICATION CHARGE

## 2024-09-07 PROCEDURE — RXMED WILLOW AMBULATORY MEDICATION CHARGE: Performed by: PSYCHIATRY & NEUROLOGY

## 2024-09-14 PROCEDURE — RXMED WILLOW AMBULATORY MEDICATION CHARGE

## 2024-09-17 ENCOUNTER — GYNECOLOGY VISIT (OUTPATIENT)
Dept: OBGYN | Facility: CLINIC | Age: 44
End: 2024-09-17
Payer: MEDICARE

## 2024-09-17 VITALS
HEIGHT: 66 IN | WEIGHT: 262.2 LBS | SYSTOLIC BLOOD PRESSURE: 124 MMHG | BODY MASS INDEX: 42.14 KG/M2 | DIASTOLIC BLOOD PRESSURE: 103 MMHG

## 2024-09-17 DIAGNOSIS — N39.46 MIXED STRESS AND URGE URINARY INCONTINENCE: ICD-10-CM

## 2024-09-17 DIAGNOSIS — N83.201 CYST OF RIGHT OVARY: ICD-10-CM

## 2024-09-17 DIAGNOSIS — N93.9 ABNORMAL UTERINE BLEEDING (AUB): ICD-10-CM

## 2024-09-17 PROCEDURE — 99203 OFFICE O/P NEW LOW 30 MIN: CPT | Performed by: OBSTETRICS & GYNECOLOGY

## 2024-09-17 PROCEDURE — 3080F DIAST BP >= 90 MM HG: CPT | Performed by: OBSTETRICS & GYNECOLOGY

## 2024-09-17 PROCEDURE — 3074F SYST BP LT 130 MM HG: CPT | Performed by: OBSTETRICS & GYNECOLOGY

## 2024-09-17 ASSESSMENT — FIBROSIS 4 INDEX: FIB4 SCORE: 0.35

## 2024-09-17 NOTE — PROGRESS NOTES
CC:  Gynecologic Exam       HPI: Patient is a 44 y.o. year old  who complains of abnormal bleeding.      She presented to the ED twice for 45 days of bleeding, starting mid July through 9/3. First time was given TXA which she used and this did not help. Serum HCG neg. Second time had US which demonstrated small hemorrhagic cyst and thick EMS, but no other abnormalities. Given Provera but did not start it and bleeding stopped spontaneously. Bleeding was heavy with clots over the whole 45 days. Had epigastric pain, but no other pain.     Reports just before this episode, she missed 1 period. Prior to all of this her period starts every 28 days for 5-7 days. Menses are usually heavy with mild to moderate cramps. Not currently bleeding.     H/o mixed urinary incontinence. Was on oxybutynin, but did not help. Still bothered by this. Worse with weight gain.    Reports PTSD, on meds. Not currently seeing someone. Reports does not leave house except for medical appts. Mom and son here, appear very supportive.    LMP Patient's last menstrual period was 2024.  Menstrual History:   How many tampons do you use on heaviest day? Many   Current contraception: Abstinence  Prior Contraception use: patch (used for 1 month)     Gynecologic:  Clots or heavy flow- yes  Miss regular activity on periods - sometimes, but is not doing much at this time   Significant pain with periods - moderate cramping  Sexual problems - prior pain with intercourse in the setting of IPV. Not sexually active for 6 years  Unusual discharge -No  Significant pelvic pain- No  Bothersome PMS- No  Bothersome menopausal symptoms - none        Social History     Substance and Sexual Activity   Sexual Activity Not Currently       Hx Dysplasia : none, but does not report prior pap  Hx STD : Denies      ALLERGIES / REACTIONS:  Allergies   Allergen Reactions    Latex Rash    Tramadol Unspecified     Makes her sick                           SOCIAL  HISTORY:   reports that she has never smoked. She has never used smokeless tobacco. She reports that she does not drink alcohol and does not use drugs.  Social History     Socioeconomic History    Marital status: Single     Spouse name: Not on file    Number of children: Not on file    Years of education: Not on file    Highest education level: Not on file   Occupational History    Not on file   Tobacco Use    Smoking status: Never    Smokeless tobacco: Never   Vaping Use    Vaping status: Never Used   Substance and Sexual Activity    Alcohol use: No    Drug use: No    Sexual activity: Not Currently   Other Topics Concern    Not on file   Social History Narrative    Not on file     Social Determinants of Health     Financial Resource Strain: Not on file   Food Insecurity: Not on file   Transportation Needs: Not on file   Physical Activity: Not on file   Stress: Not on file   Social Connections: Not on file   Intimate Partner Violence: Not on file   Housing Stability: Not on file     OBSTETRIC HISTORY:  OB History    Para Term  AB Living   3 1 1   2 1   SAB IAB Ectopic Molar Multiple Live Births   2         1      # Outcome Date GA Lbr Nirav/2nd Weight Sex Type Anes PTL Lv   3 2019 6w0d    SAB         Birth Comments: Passed on its own   2 2018 6w0d    SAB         Birth Comments: Passed on ts own   1 Term 10/10/99   8 lb M Vag-Spont None  GABI       MEDICAL HISTORY:  Past Medical History:   Diagnosis Date    Allergy     Anemia     Anxiety     Depression     Heart attack (HCC)     heart attack @ age of 17?    Hypertension     Kidney stones     Migraine        MEDICATIONS:  Current Outpatient Medications on File Prior to Visit   Medication Sig Dispense Refill    vitamin D2, Ergocalciferol, (DRISDOL) 1.25 MG (45654 UT) Cap capsule 1 Capsule Q1wk Take weekly for 12 weeks. For low vitamin d levels. StartDate : 2024 4 Capsule 2    meclizine (ANTIVERT) 25 MG Tab Take 1 tablet by mouth 3 times a day  as needed for dizziness. 42 Tablet 1    buPROPion (WELLBUTRIN XL) 300 MG XL tablet Take 1 tablet by mouth once a day 30 Tablet 2    QUEtiapine (SEROQUEL) 100 MG Tab Take 1 tablet by mouth 2 times a day 60 Tablet 2    hydrOXYzine pamoate (VISTARIL) 50 MG Cap Take 1 Capsule by mouth 2 times a day 60 Capsule 2    QUEtiapine (SEROQUEL) 100 MG Tab Take 1 tablet by mouth 2 times a day 60 Tablet 2    fluticasone-salmeterol (ADVAIR HFA) 115-21 MCG/ACT inhaler Inhale 1 puff by mouth 2 times a day 12 g 12    amLODIPine (NORVASC) 10 MG Tab Take 1 Tablet by mouth once a day For high blood pressure. StartDate : 01- 30 Tablet 12    fluticasone-salmeterol (ADVAIR DISKUS) 100-50 MCG/ACT AEROSOL POWDER, BREATH ACTIVATED Inhale 1 puff by mouth twice a day for asthma. StartDate : 01- 60 Each 12    albuterol (VENTOLIN HFA) 108 (90 Base) MCG/ACT Aero Soln inhalation aerosol Inhale 2 puffs by mouth every 4-6 hours as needed for shortness of breath 8.5 g 6    polyethylene glycol 3350 (MIRALAX) 17 GM/SCOOP Powder Take 17 g by mouth daily for constipation. Dissolve in any 4 to 8 ounces of beverage (cold, hot or room temperature) then consume. 238 g 6    topiramate (TOPAMAX) 100 MG Tab Take 1 Tablet by mouth 2 times a day 60 Tablet 1    Sennosides (SENNA) 8.6 MG Tab Take 1 tab by mouth twice a day as needed for constipation associated with iron supplements. 60 Tablet 2    ferrous sulfate 325 (65 Fe) MG tablet 1 Tablet by mouth every day 30 Tablet 2    ibuprofen (MOTRIN) 800 MG Tab Take 1 Tablet by mouth every 8 hours as needed for Moderate Pain. 30 Tablet 0    medroxyPROGESTERone (PROVERA) 10 MG Tab Take 1 Tablet by mouth every day. (Patient not taking: Reported on 9/17/2024) 30 Tablet 1    Tranexamic Acid 650 MG Tab Take 2 Tablets by mouth in the morning, at noon, and at bedtime. (Patient not taking: Reported on 9/17/2024) 30 Tablet 0    ferrous sulfate 325 (65 Fe) MG tablet Take 1 tablet orally once daily  For low iron  levels. Take for 3 months. StartDate : 07- 30 Tablet 2    Sennosides (SENNA) 8.6 MG Tab Take 2 Capsule by mouth once daily take as needed to prevent constipation associated with iron supplements. StartDate : 07- (Patient not taking: Reported on 9/17/2024) 60 Tablet 2    Tirzepatide (MOUNJARO) 15 MG/0.5ML Solution Pen-injector Inject 15 mg subcutaneously once a week. Note increased dosage: 15 mg weekly. StartDate : 05- (Patient not taking: Reported on 9/17/2024) 2 mL 6    glucose blood (ONETOUCH ULTRA) strip Use to Check blood sugar 1-2 times per day. StartDate : 05- (Patient not taking: Reported on 9/17/2024) 50 Strip 12    topiramate (TOPAMAX) 100 MG Tab Take 1 tablet by mouth twice a day (Patient not taking: Reported on 9/17/2024) 60 Tablet 2    zolpidem (AMBIEN) 10 MG Tab Take 1 tablet by mouth every night at bedtime as needed for insomnia (Patient not taking: Reported on 9/17/2024) 30 Tablet 2    sertraline (ZOLOFT) 100 MG Tab Take 2 tablets by mouth every day (Patient not taking: Reported on 9/17/2024) 60 Tablet 2    QUEtiapine (SEROQUEL) 100 MG Tab Take 1 tablet by mouth twice daily (Patient not taking: Reported on 9/17/2024) 60 Tablet 2    glucose blood (ONETOUCH ULTRA) strip Use 1 Strip to Check blood sugar 1-2 times per day. StartDate : 03- (Patient not taking: Reported on 9/17/2024) 50 Strip 12    hydrOXYzine pamoate (VISTARIL) 50 MG Cap Take 1 capsule by mouth 2 times a day 60 Capsule 2    topiramate (TOPAMAX) 100 MG Tab Take 1 Tablet by mouth 2 times a day (Patient not taking: Reported on 9/17/2024) 60 Tablet 2    buPROPion (WELLBUTRIN XL) 300 MG XL tablet Take 1 Tablet by mouth every day (Patient not taking: Reported on 9/17/2024) 30 Tablet 2    sertraline (ZOLOFT) 100 MG Tab Take 2 Tablets by mouth every day (Patient not taking: Reported on 9/17/2024) 60 Tablet 2    zolpidem (AMBIEN) 10 MG Tab Take 1 tablet by mouth every night at bedtime for 30 days. (Patient not  taking: Reported on 9/17/2024) 30 Tablet 2    zolpidem (AMBIEN CR) 12.5 MG CR tablet Take 1 tablet by mouth every night at bedtime as needed for insomnia (Patient not taking: Reported on 9/17/2024) 30 Tablet 2    topiramate (TOPAMAX) 100 MG Tab Take 1 Tablet by mouth 2 times a day (Patient not taking: Reported on 9/17/2024) 60 Tablet 2    buPROPion (WELLBUTRIN XL) 300 MG XL tablet Take 1 Tablet by mouth daily (Patient not taking: Reported on 9/17/2024) 30 Tablet 2    sertraline (ZOLOFT) 100 MG Tab Take 2 Tablets by mouth daily (Patient not taking: Reported on 9/17/2024) 60 Tablet 2    carvedilol (COREG) 25 MG Tab Take 1 Tablet by mouth once a day For blood pressure. StartDate : 01- (Patient not taking: Reported on 9/17/2024) 30 Tablet 12    Tirzepatide (MOUNJARO) 12.5 MG/0.5ML Solution Pen-injector Inject 12.5mg under the skin weekly (Patient not taking: Reported on 9/17/2024) 2 mL 3    glucose blood strip Use one strip to test blood sugar twice daily. (Patient not taking: Reported on 9/17/2024) 50 Strip 9    glucose blood (RELION TRUE METRIX TEST STRIPS) strip by Other route. (Patient not taking: Reported on 9/17/2024) 50 Strip 9    QUEtiapine (SEROQUEL) 50 MG tablet Take 1 Tablet by mouth 2 times a day (Patient not taking: Reported on 9/17/2024) 60 Tablet 1    zolpidem (AMBIEN CR) 12.5 MG CR tablet Take 1 Tablet by mouth every night at bedtime as needed for insomnia (Patient not taking: Reported on 9/17/2024) 30 Tablet 1    buPROPion (WELLBUTRIN XL) 300 MG XL tablet Take 1 Tablet by mouth daily (Patient not taking: Reported on 9/17/2024) 30 Tablet 1    oxybutynin SR (DITROPAN-XL) 10 MG CR tablet Take 1 tablet twice a day by oral route as directed for 90 days. (Patient not taking: Reported on 9/17/2024) 180 Tablet 3    Tirzepatide (MOUNJARO) 7.5 MG/0.5ML Solution Pen-injector Inject 7.5 Milligram (0.5mL) once a week (Patient not taking: Reported on 9/17/2024) 2 mL 1    TechLite Lancets Misc Check blood sugar  "1-2 times per day. (Patient not taking: Reported on 9/17/2024) 100 Each 12    Tirzepatide (MOUNJARO) 5 MG/0.5ML Solution Pen-injector Inject 5mg under the skin once a week. See provider for additional refills. (Patient not taking: Reported on 9/17/2024) 2 mL 0    Tirzepatide (MOUNJARO) 2.5 MG/0.5ML Solution Pen-injector Inject 0.5ml- 2.5mg subcutaneously once weekly (Patient not taking: Reported on 9/17/2024) 2 mL 0    cyclobenzaprine (FLEXERIL) 5 mg tablet Take 1-2 Tablets by mouth 3 times a day as needed for Muscle Spasms. 15 Tablet 0     No current facility-administered medications on file prior to visit.       FAMILY HISTORY:  Family History   Problem Relation Age of Onset    Hypertension Mother     Diabetes Mother     Cervical Cancer Mother     Stroke Father     Hypertension Father        SURGICAL HISTORY:  History reviewed. No pertinent surgical history.    ROS:   All systems were reviewed.  Review is negative except for per HPI and:  + stress and urge UI  + epigastric discomfort  + weight fluctuations    PHYSICAL EXAMINATION:  Vital Signs:   Vitals:    09/17/24 0959   BP: (!) 124/103   BP Location: Right arm   Patient Position: Sitting   BP Cuff Size: Large adult long   Weight: 262 lb 3.2 oz   Height: 5' 6\"     Appearance/Psychiatric: Appears slightly anxious, otherwise well appearing.  Constitutional: The patient is well nourished.  Neck: Neck appears symmetric.  Cardiovascular: Regular rate and without peripheral edema.  Respiratory: NWOB on RA  Breast: deferred per patient request  Abd: Benign, Soft, flat, non-tender, and No masses, organomegaly  Pelvic: Deferred today per patient request  Extremeties: Legs are symmetric and without tenderness.  Skin: No rashes observed    LABS:  Lab Results   Component Value Date/Time    WBC 5.9 09/02/2024 07:35 PM    RBC 4.20 09/02/2024 07:35 PM    HEMOGLOBIN 11.7 (L) 09/02/2024 07:35 PM    HEMATOCRIT 37.6 09/02/2024 07:35 PM    MCV 89.5 09/02/2024 07:35 PM    MCH 27.9 " 2024 07:35 PM    MCHC 31.1 (L) 2024 07:35 PM    MPV 9.8 2024 07:35 PM    NEUTSPOLYS 53.50 2024 07:35 PM    LYMPHOCYTES 37.90 2024 07:35 PM    MONOCYTES 3.40 2024 07:35 PM    EOSINOPHILS 3.60 2024 07:35 PM    BASOPHILS 1.40 2024 07:35 PM    HYPOCHROMIA 1+ 10/22/2013 01:50 AM        IMAGIN24  FINDINGS:  Both transabdominal and transvaginal scanning were performed to optimally visualize the pelvis.  UTERUS:  The uterus measures 4.98 cm x 9.34 cm x 4.62 cm.  The uterine myometrium is within normal limits.  The endometrial echo complex measures 2.32 cm.  The endometrium is unremarkable in appearance and thickness for age and menstrual status. Nabothian cysts   are seen.     OVARIES:  The right ovary measures 3.50 cm x 2.91 cm x 3.68 cm. Duplex Doppler examination of the right ovary shows normal waveforms. There is 3.4 x 2.4 x 2.5 cm anechoic lesion with septation.  The left ovary measures 3.84 cm x 2.02 cm x 2.96 cm. Duplex Doppler examination of the left ovary shows normal waveforms.  Small free fluid collection in the anterior cul-de-sac and right adnexa seen.     IMPRESSION:  1.  Right ovarian cyst with septation, could represent hemorrhagic cyst, otherwise indeterminate, recommend six-week follow-up pelvic ultrasound for repeat characterization and evaluation of stability.  2.  Markedly thickened endometrial stripe, could represent proliferative changes, consider endometrial pathology. Can be reevaluated at 6 weeks at opposite menstrual phase for repeat characterization.  3.  Nabothian cysts      ASSESSMENT AND PLAN:  44 y.o.  who presents for of abnormal uterine bleeding.    # AUB   Reviewed possible causes including structural (polyp, fibroids, adenomyosis), hyperplasia/malignancy, ovulatory dysfunction (often PCOS, due to obesity, or perimenopause), coagulopathy, iatrogenic (often use of anticoagulation or hormonal contraception), or other rare causes.  Serum HCG neg, confirming not related to pregnancy.  - Given prior regular menses, age, and missed menses prior to this, it is likely due to anovulation.  - We reviewed the recommended evaluation with Pap and EMB  - Reviewed options for management including medical management (THOMAS's, POP's, MARIANA or Provera, LNG IUD, DMPA, Lupron, [place], TXA), surgical management which can include HSC, D&C, PPY, MMY, endometiral ablation, and hysterectomy), or UAE in appropriate patients. We reviewed the options that are most appropriate for her and the associated risks and benefits.   - At this time plan is for work-up. I recommended consideration of LNG IUD, which she will think about.  - Patient prefers to do this evaluation and consider IUD, scheduled for follow-up next week.    # Complex 3.5 ovarian cyst  - Plan exam at follow-up visit per patient preference  - Repeat US in 6-12 weeks, will order next visit    # Mixed UI  - prev tx oxybutynin, not effective  - very bothersome  - referral to urgynecology    # Routine health maint  - Never had pap, mammo, etc  - Plan further discussion at next visit per patient preference.    Jody Ash M.D.  9/17/2024    My total time spent caring for the patient on the day of the encounter was 40 minutes. A significant portion of this time was spent counseling this patient regarding the problems listed

## 2024-09-17 NOTE — PROGRESS NOTES
Patient here for GYN visit. NP for Hemorrhagic ovarian cysts and abnormal uterine bleeding   LMP : 7/20/24-9/3/24  BCM : None  Pap : Never done  Mammo : Never done  Pt states Sept for ovarian cyst and abnormal uterine bleeding- in notes   Phone/Pharmacy verified: [phone]

## 2024-09-18 ENCOUNTER — PHARMACY VISIT (OUTPATIENT)
Dept: PHARMACY | Facility: MEDICAL CENTER | Age: 44
End: 2024-09-18
Payer: COMMERCIAL

## 2024-09-23 PROCEDURE — RXMED WILLOW AMBULATORY MEDICATION CHARGE: Performed by: PSYCHIATRY & NEUROLOGY

## 2024-09-23 PROCEDURE — RXMED WILLOW AMBULATORY MEDICATION CHARGE

## 2024-09-24 ENCOUNTER — GYNECOLOGY VISIT (OUTPATIENT)
Dept: OBGYN | Facility: CLINIC | Age: 44
End: 2024-09-24
Payer: MEDICARE

## 2024-09-24 ENCOUNTER — HOSPITAL ENCOUNTER (OUTPATIENT)
Facility: MEDICAL CENTER | Age: 44
End: 2024-09-24
Attending: OBSTETRICS & GYNECOLOGY
Payer: MEDICARE

## 2024-09-24 VITALS
SYSTOLIC BLOOD PRESSURE: 119 MMHG | DIASTOLIC BLOOD PRESSURE: 92 MMHG | HEIGHT: 66 IN | BODY MASS INDEX: 41.78 KG/M2 | WEIGHT: 260 LBS

## 2024-09-24 DIAGNOSIS — N93.9 ABNORMAL UTERINE BLEEDING (AUB): ICD-10-CM

## 2024-09-24 DIAGNOSIS — N83.201 CYST OF RIGHT OVARY: ICD-10-CM

## 2024-09-24 DIAGNOSIS — N93.9 ABNORMAL UTERINE BLEEDING: ICD-10-CM

## 2024-09-24 DIAGNOSIS — N93.9 ABNORMAL UTERINE BLEEDING (AUB): Primary | ICD-10-CM

## 2024-09-24 DIAGNOSIS — Z12.4 CERVICAL CANCER SCREENING: ICD-10-CM

## 2024-09-24 DIAGNOSIS — Z01.419 WELL WOMAN EXAM: ICD-10-CM

## 2024-09-24 DIAGNOSIS — N39.46 MIXED STRESS AND URGE URINARY INCONTINENCE: ICD-10-CM

## 2024-09-24 DIAGNOSIS — Z12.31 ENCOUNTER FOR SCREENING MAMMOGRAM FOR MALIGNANT NEOPLASM OF BREAST: ICD-10-CM

## 2024-09-24 LAB — PATHOLOGY CONSULT NOTE: NORMAL

## 2024-09-24 PROCEDURE — RXMED WILLOW AMBULATORY MEDICATION CHARGE: Performed by: OBSTETRICS & GYNECOLOGY

## 2024-09-24 PROCEDURE — 88305 TISSUE EXAM BY PATHOLOGIST: CPT

## 2024-09-24 PROCEDURE — 88175 CYTOPATH C/V AUTO FLUID REDO: CPT

## 2024-09-24 PROCEDURE — 87624 HPV HI-RISK TYP POOLED RSLT: CPT

## 2024-09-24 RX ORDER — IBUPROFEN 200 MG
600 TABLET ORAL ONCE
Status: COMPLETED | OUTPATIENT
Start: 2024-09-24 | End: 2024-09-24

## 2024-09-24 RX ORDER — MEDROXYPROGESTERONE ACETATE 10 MG
10 TABLET ORAL DAILY
Qty: 30 TABLET | Refills: 1 | Status: SHIPPED | OUTPATIENT
Start: 2024-09-24

## 2024-09-24 RX ORDER — IBUPROFEN 200 MG
600 TABLET ORAL ONCE
Status: COMPLETED | COMMUNITY
Start: 2024-09-24 | End: 2024-09-24

## 2024-09-24 RX ADMIN — Medication 600 MG: at 15:32

## 2024-09-24 RX ADMIN — Medication 600 MG: at 15:23

## 2024-09-24 ASSESSMENT — FIBROSIS 4 INDEX: FIB4 SCORE: 0.35

## 2024-09-24 NOTE — PROGRESS NOTES
"GYN FOLLOW UP VISIT    CC:  Gynecologic Exam (Patient is here to discuss abnormal uterine bleeding.)       HPI: Patient is a 44 y.o.  who presents for follow up for EMB for AUB.  No bleeding since we last saw her, but started cramping today and worried she may start bleeding again. Declines IUD today, does not feel like she has learned enough about this.     Urinary urgency really bothersome, but denies dysuria, hematuria, or otherwise.      ROS:   General: denies fever / chills  HEENT: denies sore throat:  CV: denies chest pain:  Repiratory: denies shortness of breath  GI: denies abdominal pain  : denies dysuria:    PFSH:  I personally reviewed the past medical and surgical histories. Any changes have been updated in the chart.      PHYSICAL EXAMINATION:  Vital Signs:   Vitals:    24 1431   BP: (!) 119/92   BP Location: Right arm   Patient Position: Sitting   BP Cuff Size: Large adult   Weight: 260 lb   Height: 5' 6\"     Body mass index is 41.97 kg/m².    Gen: appears well, NAD  Respiratory: normal effort  Abdomen: Soft, non-tender.  Pelvic Exam:    Vulva: normal.    Urethra: normal.   Vagina: normal.    Cervix: normal.    Uterus: normal size, shape and contour.    left adnexa: normal.   right adnexa: normal.   Perineum: normal.    ASSESSMENT AND PLAN:  44 y.o.  who presents for follow-up of AUB and other issues as below.    1. Cyst of right ovary  - US-PELVIC COMPLETE (TRANSABDOMINAL/TRANSVAGINAL) (COMBO); Future    2. AUB  - EMB completed today  - Provera PRN during menses until decides on management going forward. Feels very uncertain on what she wants to do, but encouraged management.   -Return in 3 months for ongoing care    3. Mixed UI  - PFPT referral and urogynecology referral sent at last visit as well  - UA, reflex to Ucx if abnormal     4. Screening and routine care  - Mammo ordered, reviewed self breast exam  - Pap and HPV test collected today    Jody Ash MD  Obstetrics and " Gynecology    A total of 33 minutes of time was spent on day of encounter reviewing medical record, performing history and examination, counseling, ordering medication/test/consults, collaborating with referring service, and documentation. This time does not include time spent performing procedures. This time was spent due to extensive counseling required to care for this patient.     Jody Ash MD  Obstetrics and Gynecology

## 2024-09-24 NOTE — PROGRESS NOTES
Patient is here to further discuss her options for management of her abnormal uterine bleeding.  Transvaginal US revealed thickened endometrial stripe as well as a right ovarian cyst.  Patient reports she has not had any more bleeding, but reports pelvic cramping today.  LMP 7/20/2024 through 9/3/2024 - her bleeding was heavy with clots.  Patient states she has never had a pap smear and has never done a mammogram.  Patient is not currently sexually active.

## 2024-09-25 NOTE — PROCEDURES
Endometrial Biopsy    Jennifer Huffman is a 44 y.o. female        Patient's last menstrual period was 2024 (approximate).    Chief complaint    Chief Complaint   Patient presents with    Gynecologic Exam     Patient is here to discuss abnormal uterine bleeding.   Endometrial biopsy procedure:    Informed consent obtained from pt. Risks discussed including bleeding, infection, damage to other organs, perforation of uterus. Patient verbalized understanding and signed consent.    Patient placed in dorsal lithotomy position in stirrups.     Speculum placed in vaginal canal and cervix fully visualized. Pap was collected. The cervix cleaned with betadine solution. 2 cc of 1% lidocaine was then ijected into the cervical stroma at 12 o'clock. In the location on the anterior lip, the cervix was grasped with a single tooth tenaculum. Uterus sounded to 8 cm. Pipelle introduced and copious endometrial curettings were obtained x 2 passes. All tissue sent to pathology. Tenaculum removed, sites hemostatic and speculum removed.    Pt tolerated procedure well.    A/P:    1. Abnormal uterine bleeding (AUB)    2. Cyst of right ovary    3. Mixed stress and urge urinary incontinence    4. Cervical cancer screening    5. Encounter for screening mammogram for malignant neoplasm of breast    6. Abnormal uterine bleeding    7. Well woman exam      Orders Placed This Encounter    US-PELVIC COMPLETE (TRANSABDOMINAL/TRANSVAGINAL) (COMBO)    MA-SCREENING MAMMO BILAT W/TOMOSYNTHESIS W/CAD    THINPREP PAP WITH HPV    Pathology Specimen    URINALYSIS,CULTURE IF INDICATED    Referral to Physical Therapy    ibuprofen (Motrin) tablet 600 mg    medroxyPROGESTERone (PROVERA) 10 MG Tab    ibuprofen (Motrin) tablet 600 mg    Consent for all Surgical, Special Diagnostic or Therapeutic Procedures        Follow up pathology    Pt to be notified with results.    Follow up in 12 weeks or sooner.        Jody Ash MD  Obstetrics  and Gynecology

## 2024-09-26 ENCOUNTER — PHARMACY VISIT (OUTPATIENT)
Dept: PHARMACY | Facility: MEDICAL CENTER | Age: 44
End: 2024-09-26
Payer: COMMERCIAL

## 2024-09-26 NOTE — PROGRESS NOTES
Urogynecology & Reconstructive Pelvic Surgery - Consultation Visit    Jennifer Huffman MRN:[id number] :1980    Referred by: Dr. Jody Ash    Reason for Visit: No chief complaint on file.        Subjective     History of Presenting Illness:    Jennifer Huffman is a 44 y.o. P1 who presents for the evaluation and management of mixed urinary incontinence.     UI has worsened with weight gain.       Prior Pelvic surgery:   ***     Prior treatment:   Oxybutyin - not helpful    Fluid intake:   ***    Pelvic floor symptom review:     Bladder:   Voids per day: *** Voids per night: ***      Urinary incontinence episodes per day: ***    Urge leakage:  {none/on movement to bathroom/full bladder:76251}   Stress leakage: {none/with cough/with laugh/with excercise/..:02940}   Continuous / insensible urine loss: {YES/NO:}   Nocturnal enuresis: {YES/NO:}   Leakage volume: {drops/moderate/large...:71432}   Number of pads/day: ***    Bladder emptying: {complete/incomplete:46193}   Voiding symptoms: {none/hesitancy/post-void...:56583}   UTI in last 12 months: ***   Other urologic history: ***      Prolapse:     Prolapse symptoms: {none/bulge/exteriorized:93856}   Degree of prolapse: {to introitus/beyond introitus/..:47602}   Duration of prolapse symptoms: ***      Bowel:    Constipation: {YES/NO:}   Bowel movements per day: ***    Straining to empty bowels: {YES/NO:}   Splinting to evacuate: {YES/NO:}   Painful evacuation: {YES/NO:}   Difficulty emptying rectum: {YES/NO:}   Incontinence to stool: ***   Blood in stool: {YES/NO:}   Hemorrhoids: {YES/NO:}   Bowel conditions: {IBS/crohns/uc/celiac/cancer/..:02578}   Most recent colonoscopy: ***       Sexual function:    Sexually active: {YES/NO:}   Gender of partners: {male/female/trans-male/trans...:06523}   Pain with intercourse: {Yes/No:22138}       Pelvic Pain: ***      Past medical and surgical history    Past  obstetric history   Number of vaginal deliveries: ***   Number of  deliveries: ***   History of vacuum/forceps: {YES/NO:}   History of obstetric anal sphincter injury: {YES/NO:}    Past gynecological history:    Last menstrual period/Menopause: Patient's last menstrual period was 2024 (approximate).   History of abnormal uterine bleeding: {YES/NO:}   History of fibroids: {YES/NO:}   History of endometrial polyps:  {YES/NO:}   History of endometriosis: {YES/NO:}   History of cervical dysplasia: {YES/NO:}   Last pap: ***   Current contraception: ***      Past medical history:  Past Medical History:   Diagnosis Date    Allergy     Anemia     Anxiety     Depression     Heart attack (HCC)     heart attack @ age of 17?    Hypertension     Kidney stones     Migraine      Past surgical history:No past surgical history on file.  Medications:has a current medication list which includes the following prescription(s): medroxyprogesterone, doxycycline, amphetamine-dextroamphetamine, lisinopril, metformin, ondansetron, zonisamide, quetiapine, quetiapine, sertraline, topiramate, tranexamic acid, ferrous sulfate, senna, vitamin d2 (ergocalciferol), meclizine, mounjaro, onetouch ultra, topiramate, bupropion, zolpidem, sertraline, quetiapine, onetouch ultra, hydroxyzine pamoate, quetiapine, topiramate, bupropion, sertraline, zolpidem, zolpidem, hydroxyzine pamoate, quetiapine, topiramate, bupropion, sertraline, fluticasone-salmeterol, amlodipine, fluticasone-salmeterol, carvedilol, mounjaro, albuterol, glucose blood, relion true metrix test strips, polyethylene glycol 3350, quetiapine, zolpidem, bupropion, topiramate, oxybutynin sr, mounjaro, senna, ferrous sulfate, techlite lancets, mounjaro, mounjaro, cyclobenzaprine, and ibuprofen.  Allergies:Latex and Tramadol  Family history:  Family History   Problem Relation Age of Onset    Hypertension Mother     Diabetes Mother     Cervical  "Cancer Mother     Stroke Father     Hypertension Father      Social history: reports that she has never smoked. She has never used smokeless tobacco. She reports that she does not drink alcohol and does not use drugs.    Review of systems: A full review of systems was performed, and negative with the exception of want is noted above in the HPI.        Objective        LMP 07/20/2024 (Approximate)     Physical Exam ***    Genitourinary:    Vulva: {wnl/atrophic/lichen sclerosis/...:17423}   Bulbocavernosus reflex: {Intact/Absent:49385}   Anal wink reflex: {Intact/Absent:47214}   Perineal sensation: {wnl/decreased/asymmetrical/...:41845}   Urethra: {wnl/atrophic/caruncle/prolapse/...:70272}   Vagina: {wnl/atrophic/friable/stenotic/...:81693}   Atrophy: {Desc; none/mild/moderate/severe:974283::\"None\"}   Cough stress test: {not performed/positive/negative:15148}    Chaperone was present throughout the physical exam.     Pelvic floor:    POP-Q:   Aa *** Ba *** C ***   GH *** PB *** TVL***   Ap *** Bp *** D ***      Prolapse stage: ***   Paravaginal defect: {left/right/bilateral:76921}   Cervical elongation: {YES/NO:20266}   Urethral tenderness: {YES/NO:20266}   Bladder/ suprapubic tenderness: {YES/NO:20266}   Levator tenderness: {none/left/right/bilateral:44403}   Levator muscle tone: {wnl/hypertonic/hypotonic:91893}   Pelvic floor contraction strength (modified Oxford scale): {0=none/1=flicker/2=weak/...:79048}   Pelvic floor contraction duration: {absent/brief/normal:41624}   Bimanual exam: {small, mobile uterus/ bulky uterus/...:76918}   Anal resting tone: {wnl/absent/decreased/...:90554}   Anal squeeze tone: {wnl/absent/decreased/...:70494}   Palpable anal sphincter defect: {YES/NO:20266}   Granulation tissue: {YES/NO:20266}   Epithelial erosions: {YES/NO:20266}   Epithelial ulcerations: {YES/NO:20266}   Fistula: {none/apical/anterior/...:42653}   Rectal: deferred***   Vaginal band/stricture: " {YES/NO:20266}    Procedure Performed: {no/bladder instillation/urethral......:53648}    Chaperone was present throughout the physical exam.     Diagnostic test and records review:    Urine dipstick: ***     Post-void residual: *** mL, performed by {bladder scanner/cath....:05294}    Labs: ***  Glycohemoglobin   Date Value Ref Range Status   08/11/2017 6.3 (H) 0.0 - 5.6 % Final     Comment:     Increased risk for diabetes:  5.7 -6.4%  Diabetes:  >6.4%  Glycemic control for adults with diabetes:  <7.0%  The above interpretations are per ADA guidelines.  Diagnosis  of diabetes mellitus on the basis of elevated Hemoglobin A1c  should be confirmed by repeating the Hb A1c test.         Lab Results   Component Value Date/Time    SODIUM 139 09/02/2024 1935    POTASSIUM 4.5 09/02/2024 1935    CHLORIDE 109 09/02/2024 1935    CO2 18 (L) 09/02/2024 1935    GLUCOSE 119 (H) 09/02/2024 1935    BUN 12 09/02/2024 1935    CREATININE 0.83 09/02/2024 1935    CALCIUM 8.7 09/02/2024 1935    ANION 12.0 09/02/2024 1935     Lab Results   Component Value Date/Time    WBC 5.9 09/02/2024 07:35 PM    RBC 4.20 09/02/2024 07:35 PM    HEMOGLOBIN 11.7 (L) 09/02/2024 07:35 PM    MCV 89.5 09/02/2024 07:35 PM    MCH 27.9 09/02/2024 07:35 PM    MCHC 31.1 (L) 09/02/2024 07:35 PM    RDW 46.4 09/02/2024 07:35 PM    MPV 9.8 09/02/2024 07:35 PM         Radiology: None    Procedural: None    Documentation reviewed: Prior EMR Records    Outside records reviewed: 0 pages      Assessment & Plan     Jennifer Huffman is a 44 y.o. P1 with mixed urinary incontinence. We discussed my recommendations for further diagnosis and treatment at length today.     Assessment & Plan        Kristie Phelps MD  Urogynecology and Reconstructive Pelvic Surgery  Department of Obstetrics and Gynecology  [facility]

## 2024-09-27 ENCOUNTER — APPOINTMENT (OUTPATIENT)
Dept: GYNECOLOGY | Facility: CLINIC | Age: 44
End: 2024-09-27
Payer: MEDICARE

## 2024-09-27 NOTE — PROGRESS NOTES
PT here today ref urinary incontinence (mixed stress/urge)  PT States  Hysterectomy: no  Good #: [phone]   PVR :   Pharmacy Verified

## 2024-09-28 LAB
CYTOLOGIST CVX/VAG CYTO: NORMAL
CYTOLOGY CVX/VAG DOC CYTO: NORMAL
CYTOLOGY CVX/VAG DOC THIN PREP: NORMAL
HPV I/H RISK 4 DNA CVX QL PROBE+SIG AMP: NEGATIVE
NOTE NL11727A: NORMAL
OTHER STN SPEC: NORMAL
STAT OF ADQ CVX/VAG CYTO-IMP: NORMAL

## 2024-09-30 PROCEDURE — RXMED WILLOW AMBULATORY MEDICATION CHARGE

## 2024-10-02 ENCOUNTER — APPOINTMENT (OUTPATIENT)
Dept: GYNECOLOGY | Facility: CLINIC | Age: 44
End: 2024-10-02
Payer: MEDICARE

## 2024-10-02 ENCOUNTER — HOSPITAL ENCOUNTER (OUTPATIENT)
Facility: MEDICAL CENTER | Age: 44
End: 2024-10-02
Attending: STUDENT IN AN ORGANIZED HEALTH CARE EDUCATION/TRAINING PROGRAM
Payer: MEDICARE

## 2024-10-02 VITALS
WEIGHT: 250.2 LBS | BODY MASS INDEX: 40.38 KG/M2 | SYSTOLIC BLOOD PRESSURE: 101 MMHG | DIASTOLIC BLOOD PRESSURE: 86 MMHG | HEART RATE: 78 BPM

## 2024-10-02 DIAGNOSIS — N39.46 MIXED STRESS AND URGE URINARY INCONTINENCE: ICD-10-CM

## 2024-10-02 DIAGNOSIS — K59.00 CONSTIPATION, UNSPECIFIED CONSTIPATION TYPE: ICD-10-CM

## 2024-10-02 DIAGNOSIS — E11.9 DIABETES MELLITUS TYPE 2, NONINSULIN DEPENDENT (HCC): ICD-10-CM

## 2024-10-02 LAB
APPEARANCE UR: CLEAR
APPEARANCE UR: NORMAL
BACTERIA #/AREA URNS HPF: NEGATIVE /HPF
BILIRUB UR QL STRIP.AUTO: ABNORMAL
BILIRUB UR STRIP-MCNC: NORMAL MG/DL
CAOX CRY #/AREA URNS HPF: ABNORMAL /HPF
COLOR UR AUTO: YELLOW
COLOR UR: YELLOW
EPI CELLS #/AREA URNS HPF: ABNORMAL /HPF
GLUCOSE UR STRIP.AUTO-MCNC: NEGATIVE MG/DL
GLUCOSE UR STRIP.AUTO-MCNC: NEGATIVE MG/DL
HYALINE CASTS #/AREA URNS LPF: ABNORMAL /LPF
KETONES UR STRIP.AUTO-MCNC: NEGATIVE MG/DL
KETONES UR STRIP.AUTO-MCNC: NEGATIVE MG/DL
LEUKOCYTE ESTERASE UR QL STRIP.AUTO: NEGATIVE
LEUKOCYTE ESTERASE UR QL STRIP.AUTO: NEGATIVE
MICRO URNS: ABNORMAL
NITRITE UR QL STRIP.AUTO: NEGATIVE
NITRITE UR QL STRIP.AUTO: NEGATIVE
PH UR STRIP.AUTO: 5.5 [PH] (ref 5–8)
PH UR STRIP.AUTO: 6 [PH] (ref 5–8)
PROT UR QL STRIP: NEGATIVE MG/DL
PROT UR QL STRIP: NEGATIVE MG/DL
RBC # URNS HPF: ABNORMAL /HPF
RBC UR QL AUTO: ABNORMAL
RBC UR QL AUTO: NORMAL
SP GR UR STRIP.AUTO: >=1.03
SP GR UR STRIP.AUTO: >=1.03
UROBILINOGEN UR STRIP-MCNC: 0.2 MG/DL
UROBILINOGEN UR STRIP.AUTO-MCNC: 0.2 MG/DL
WBC #/AREA URNS HPF: ABNORMAL /HPF

## 2024-10-02 PROCEDURE — 99204 OFFICE O/P NEW MOD 45 MIN: CPT | Performed by: STUDENT IN AN ORGANIZED HEALTH CARE EDUCATION/TRAINING PROGRAM

## 2024-10-02 PROCEDURE — 81002 URINALYSIS NONAUTO W/O SCOPE: CPT | Performed by: STUDENT IN AN ORGANIZED HEALTH CARE EDUCATION/TRAINING PROGRAM

## 2024-10-02 PROCEDURE — 3074F SYST BP LT 130 MM HG: CPT | Performed by: STUDENT IN AN ORGANIZED HEALTH CARE EDUCATION/TRAINING PROGRAM

## 2024-10-02 PROCEDURE — RXMED WILLOW AMBULATORY MEDICATION CHARGE: Performed by: STUDENT IN AN ORGANIZED HEALTH CARE EDUCATION/TRAINING PROGRAM

## 2024-10-02 PROCEDURE — 3079F DIAST BP 80-89 MM HG: CPT | Performed by: STUDENT IN AN ORGANIZED HEALTH CARE EDUCATION/TRAINING PROGRAM

## 2024-10-02 PROCEDURE — RXMED WILLOW AMBULATORY MEDICATION CHARGE

## 2024-10-02 PROCEDURE — 81001 URINALYSIS AUTO W/SCOPE: CPT

## 2024-10-02 RX ORDER — MIRABEGRON 25 MG/1
25 TABLET, FILM COATED, EXTENDED RELEASE ORAL DAILY
Qty: 90 TABLET | Refills: 3 | Status: SHIPPED | OUTPATIENT
Start: 2024-10-02 | End: 2025-09-27

## 2024-10-02 RX ORDER — DEXTROAMPHETAMINE SACCHARATE, AMPHETAMINE ASPARTATE, DEXTROAMPHETAMINE SULFATE AND AMPHETAMINE SULFATE 7.5; 7.5; 7.5; 7.5 MG/1; MG/1; MG/1; MG/1
30 TABLET ORAL 2 TIMES DAILY
COMMUNITY

## 2024-10-02 ASSESSMENT — FIBROSIS 4 INDEX: FIB4 SCORE: 0.35

## 2024-10-03 ENCOUNTER — TELEPHONE (OUTPATIENT)
Dept: GYNECOLOGY | Facility: CLINIC | Age: 44
End: 2024-10-03
Payer: MEDICARE

## 2024-10-03 DIAGNOSIS — R31.29 MICROHEMATURIA: ICD-10-CM

## 2024-10-03 DIAGNOSIS — N20.0 KIDNEY STONES: ICD-10-CM

## 2024-10-06 ENCOUNTER — PHARMACY VISIT (OUTPATIENT)
Dept: PHARMACY | Facility: MEDICAL CENTER | Age: 44
End: 2024-10-06
Payer: COMMERCIAL

## 2024-10-07 PROCEDURE — RXMED WILLOW AMBULATORY MEDICATION CHARGE

## 2024-10-09 ENCOUNTER — PHARMACY VISIT (OUTPATIENT)
Dept: PHARMACY | Facility: MEDICAL CENTER | Age: 44
End: 2024-10-09
Payer: COMMERCIAL

## 2024-10-14 ENCOUNTER — OFFICE VISIT (OUTPATIENT)
Dept: UROLOGY | Facility: MEDICAL CENTER | Age: 44
End: 2024-10-14
Payer: MEDICARE

## 2024-10-14 VITALS — TEMPERATURE: 97.1 F

## 2024-10-14 DIAGNOSIS — N39.46 MIXED STRESS AND URGE URINARY INCONTINENCE: ICD-10-CM

## 2024-10-14 DIAGNOSIS — Z87.442 PERSONAL HISTORY OF KIDNEY STONES: Primary | ICD-10-CM

## 2024-10-14 PROCEDURE — 99204 OFFICE O/P NEW MOD 45 MIN: CPT | Performed by: PHYSICIAN ASSISTANT

## 2024-10-14 PROCEDURE — RXMED WILLOW AMBULATORY MEDICATION CHARGE

## 2024-10-16 PROCEDURE — RXMED WILLOW AMBULATORY MEDICATION CHARGE: Performed by: OBSTETRICS & GYNECOLOGY

## 2024-10-17 PROCEDURE — RXMED WILLOW AMBULATORY MEDICATION CHARGE: Performed by: PSYCHIATRY & NEUROLOGY

## 2024-10-18 ENCOUNTER — PHARMACY VISIT (OUTPATIENT)
Dept: PHARMACY | Facility: MEDICAL CENTER | Age: 44
End: 2024-10-18
Payer: COMMERCIAL

## 2024-10-22 ENCOUNTER — APPOINTMENT (OUTPATIENT)
Dept: RADIOLOGY | Facility: MEDICAL CENTER | Age: 44
End: 2024-10-22
Attending: STUDENT IN AN ORGANIZED HEALTH CARE EDUCATION/TRAINING PROGRAM
Payer: MEDICARE

## 2024-10-31 DIAGNOSIS — N93.9 ABNORMAL UTERINE BLEEDING: ICD-10-CM

## 2024-10-31 PROCEDURE — RXMED WILLOW AMBULATORY MEDICATION CHARGE

## 2024-10-31 RX ORDER — MEDROXYPROGESTERONE ACETATE 10 MG
10 TABLET ORAL DAILY
Qty: 30 TABLET | Refills: 1 | Status: CANCELLED | OUTPATIENT
Start: 2024-10-31

## 2024-11-02 DIAGNOSIS — N93.9 ABNORMAL UTERINE BLEEDING: ICD-10-CM

## 2024-11-02 PROCEDURE — RXMED WILLOW AMBULATORY MEDICATION CHARGE

## 2024-11-04 NOTE — TELEPHONE ENCOUNTER
Received request via: Pharmacy    Was the patient seen in the last year in this department? Yes    Pharmacy Name: [facility]- [place]    Does the patient have long-term Plus and need 100-day supply? (This applies to ALL medications) Patient does not have SCP

## 2024-11-05 PROCEDURE — RXMED WILLOW AMBULATORY MEDICATION CHARGE

## 2024-11-05 RX ORDER — MEDROXYPROGESTERONE ACETATE 10 MG
10 TABLET ORAL DAILY
Qty: 30 TABLET | Refills: 1 | Status: SHIPPED | OUTPATIENT
Start: 2024-11-05

## 2024-11-07 ENCOUNTER — PHARMACY VISIT (OUTPATIENT)
Dept: PHARMACY | Facility: MEDICAL CENTER | Age: 44
End: 2024-11-07
Payer: COMMERCIAL

## 2024-11-07 PROCEDURE — RXMED WILLOW AMBULATORY MEDICATION CHARGE: Performed by: OBSTETRICS & GYNECOLOGY

## 2024-11-13 ENCOUNTER — PHARMACY VISIT (OUTPATIENT)
Dept: PHARMACY | Facility: MEDICAL CENTER | Age: 44
End: 2024-11-13
Payer: COMMERCIAL

## 2024-11-20 PROCEDURE — RXMED WILLOW AMBULATORY MEDICATION CHARGE

## 2024-11-21 ENCOUNTER — PHARMACY VISIT (OUTPATIENT)
Dept: PHARMACY | Facility: MEDICAL CENTER | Age: 44
End: 2024-11-21
Payer: COMMERCIAL

## 2024-12-02 PROCEDURE — RXMED WILLOW AMBULATORY MEDICATION CHARGE: Performed by: OBSTETRICS & GYNECOLOGY

## 2024-12-02 PROCEDURE — RXMED WILLOW AMBULATORY MEDICATION CHARGE

## 2024-12-02 PROCEDURE — RXMED WILLOW AMBULATORY MEDICATION CHARGE: Performed by: PSYCHIATRY & NEUROLOGY

## 2024-12-04 ENCOUNTER — PHARMACY VISIT (OUTPATIENT)
Dept: PHARMACY | Facility: MEDICAL CENTER | Age: 44
End: 2024-12-04
Payer: COMMERCIAL

## 2024-12-16 PROCEDURE — RXMED WILLOW AMBULATORY MEDICATION CHARGE: Performed by: STUDENT IN AN ORGANIZED HEALTH CARE EDUCATION/TRAINING PROGRAM

## 2024-12-16 PROCEDURE — RXMED WILLOW AMBULATORY MEDICATION CHARGE

## 2024-12-17 ENCOUNTER — ANESTHESIA (OUTPATIENT)
Dept: SURGERY | Facility: MEDICAL CENTER | Age: 44
DRG: 661 | End: 2024-12-17
Payer: MEDICARE

## 2024-12-17 ENCOUNTER — ANESTHESIA EVENT (OUTPATIENT)
Dept: SURGERY | Facility: MEDICAL CENTER | Age: 44
DRG: 661 | End: 2024-12-17
Payer: MEDICARE

## 2024-12-17 ENCOUNTER — HOSPITAL ENCOUNTER (INPATIENT)
Facility: MEDICAL CENTER | Age: 44
LOS: 1 days | DRG: 661 | End: 2024-12-18
Attending: EMERGENCY MEDICINE | Admitting: HOSPITALIST
Payer: MEDICARE

## 2024-12-17 ENCOUNTER — APPOINTMENT (OUTPATIENT)
Dept: RADIOLOGY | Facility: MEDICAL CENTER | Age: 44
DRG: 661 | End: 2024-12-17
Attending: EMERGENCY MEDICINE
Payer: MEDICARE

## 2024-12-17 ENCOUNTER — APPOINTMENT (OUTPATIENT)
Dept: RADIOLOGY | Facility: MEDICAL CENTER | Age: 44
DRG: 661 | End: 2024-12-17
Attending: STUDENT IN AN ORGANIZED HEALTH CARE EDUCATION/TRAINING PROGRAM
Payer: MEDICARE

## 2024-12-17 DIAGNOSIS — R52 INTRACTABLE PAIN: ICD-10-CM

## 2024-12-17 DIAGNOSIS — N20.1 URETEROLITHIASIS: ICD-10-CM

## 2024-12-17 PROBLEM — N20.0 NEPHROLITHIASIS: Status: ACTIVE | Noted: 2024-12-17

## 2024-12-17 PROBLEM — E11.9 DM (DIABETES MELLITUS) (HCC): Status: ACTIVE | Noted: 2024-12-17

## 2024-12-17 PROBLEM — I10 PRIMARY HYPERTENSION: Status: ACTIVE | Noted: 2024-12-17

## 2024-12-17 PROBLEM — N13.30 HYDRONEPHROSIS: Status: ACTIVE | Noted: 2024-12-17

## 2024-12-17 LAB
ALBUMIN SERPL BCP-MCNC: 3.9 G/DL (ref 3.2–4.9)
ALBUMIN/GLOB SERPL: 1.1 G/DL
ALP SERPL-CCNC: 82 U/L (ref 30–99)
ALT SERPL-CCNC: 13 U/L (ref 2–50)
ANION GAP SERPL CALC-SCNC: 15 MMOL/L (ref 7–16)
APPEARANCE UR: ABNORMAL
AST SERPL-CCNC: 14 U/L (ref 12–45)
BACTERIA #/AREA URNS HPF: ABNORMAL /HPF
BASOPHILS # BLD AUTO: 0.2 % (ref 0–1.8)
BASOPHILS # BLD: 0.03 K/UL (ref 0–0.12)
BILIRUB SERPL-MCNC: 0.4 MG/DL (ref 0.1–1.5)
BILIRUB UR QL STRIP.AUTO: NEGATIVE
BUN SERPL-MCNC: 9 MG/DL (ref 8–22)
CA OXALATE CRYSTAL  1765: PRESENT /HPF
CALCIUM ALBUM COR SERPL-MCNC: 9.3 MG/DL (ref 8.5–10.5)
CALCIUM SERPL-MCNC: 9.2 MG/DL (ref 8.5–10.5)
CASTS URNS QL MICRO: ABNORMAL /LPF (ref 0–2)
CHLORIDE SERPL-SCNC: 105 MMOL/L (ref 96–112)
CO2 SERPL-SCNC: 19 MMOL/L (ref 20–33)
COLOR UR: YELLOW
CREAT SERPL-MCNC: 0.92 MG/DL (ref 0.5–1.4)
EOSINOPHIL # BLD AUTO: 0.02 K/UL (ref 0–0.51)
EOSINOPHIL NFR BLD: 0.2 % (ref 0–6.9)
EPITHELIAL CELLS 1715: ABNORMAL /HPF (ref 0–5)
ERYTHROCYTE [DISTWIDTH] IN BLOOD BY AUTOMATED COUNT: 45.3 FL (ref 35.9–50)
GFR SERPLBLD CREATININE-BSD FMLA CKD-EPI: 78 ML/MIN/1.73 M 2
GLOBULIN SER CALC-MCNC: 3.5 G/DL (ref 1.9–3.5)
GLUCOSE BLD STRIP.AUTO-MCNC: 114 MG/DL (ref 65–99)
GLUCOSE BLD STRIP.AUTO-MCNC: 117 MG/DL (ref 65–99)
GLUCOSE BLD STRIP.AUTO-MCNC: 129 MG/DL (ref 65–99)
GLUCOSE BLD STRIP.AUTO-MCNC: 80 MG/DL (ref 65–99)
GLUCOSE BLD STRIP.AUTO-MCNC: 95 MG/DL (ref 65–99)
GLUCOSE BLD STRIP.AUTO-MCNC: 95 MG/DL (ref 65–99)
GLUCOSE SERPL-MCNC: 141 MG/DL (ref 65–99)
GLUCOSE UR STRIP.AUTO-MCNC: NEGATIVE MG/DL
HCG SERPL QL: NEGATIVE
HCT VFR BLD AUTO: 40.2 % (ref 37–47)
HGB BLD-MCNC: 13.2 G/DL (ref 12–16)
IMM GRANULOCYTES # BLD AUTO: 0.05 K/UL (ref 0–0.11)
IMM GRANULOCYTES NFR BLD AUTO: 0.4 % (ref 0–0.9)
KETONES UR STRIP.AUTO-MCNC: 80 MG/DL
LEUKOCYTE ESTERASE UR QL STRIP.AUTO: ABNORMAL
LIPASE SERPL-CCNC: 14 U/L (ref 11–82)
LYMPHOCYTES # BLD AUTO: 1.3 K/UL (ref 1–4.8)
LYMPHOCYTES NFR BLD: 9.8 % (ref 22–41)
MCH RBC QN AUTO: 28.1 PG (ref 27–33)
MCHC RBC AUTO-ENTMCNC: 32.8 G/DL (ref 32.2–35.5)
MCV RBC AUTO: 85.5 FL (ref 81.4–97.8)
MICRO URNS: ABNORMAL
MONOCYTES # BLD AUTO: 0.78 K/UL (ref 0–0.85)
MONOCYTES NFR BLD AUTO: 5.9 % (ref 0–13.4)
MUCOUS THREADS URNS QL MICRO: PRESENT /HPF
NEUTROPHILS # BLD AUTO: 11.14 K/UL (ref 1.82–7.42)
NEUTROPHILS NFR BLD: 83.5 % (ref 44–72)
NITRITE UR QL STRIP.AUTO: NEGATIVE
NRBC # BLD AUTO: 0 K/UL
NRBC BLD-RTO: 0 /100 WBC (ref 0–0.2)
PH UR STRIP.AUTO: 5.5 [PH] (ref 5–8)
PLATELET # BLD AUTO: 505 K/UL (ref 164–446)
PMV BLD AUTO: 9.6 FL (ref 9–12.9)
POTASSIUM SERPL-SCNC: 3.4 MMOL/L (ref 3.6–5.5)
PROT SERPL-MCNC: 7.4 G/DL (ref 6–8.2)
PROT UR QL STRIP: NEGATIVE MG/DL
RBC # BLD AUTO: 4.7 M/UL (ref 4.2–5.4)
RBC # URNS HPF: ABNORMAL /HPF (ref 0–2)
RBC UR QL AUTO: ABNORMAL
SODIUM SERPL-SCNC: 139 MMOL/L (ref 135–145)
SP GR UR STRIP.AUTO: 1.03
UROBILINOGEN UR STRIP.AUTO-MCNC: 1 EU/DL
WBC # BLD AUTO: 13.3 K/UL (ref 4.8–10.8)
WBC #/AREA URNS HPF: ABNORMAL /HPF

## 2024-12-17 PROCEDURE — 0T768DZ DILATION OF RIGHT URETER WITH INTRALUMINAL DEVICE, VIA NATURAL OR ARTIFICIAL OPENING ENDOSCOPIC: ICD-10-PCS | Performed by: STUDENT IN AN ORGANIZED HEALTH CARE EDUCATION/TRAINING PROGRAM

## 2024-12-17 PROCEDURE — 82962 GLUCOSE BLOOD TEST: CPT | Mod: 91

## 2024-12-17 PROCEDURE — 700101 HCHG RX REV CODE 250: Performed by: STUDENT IN AN ORGANIZED HEALTH CARE EDUCATION/TRAINING PROGRAM

## 2024-12-17 PROCEDURE — 700102 HCHG RX REV CODE 250 W/ 637 OVERRIDE(OP): Performed by: HOSPITALIST

## 2024-12-17 PROCEDURE — M0220 HCHG TIXAGEVIMAB/CILGAVIMAB ADMINISTRATION: HCPCS

## 2024-12-17 PROCEDURE — 87086 URINE CULTURE/COLONY COUNT: CPT

## 2024-12-17 PROCEDURE — 96374 THER/PROPH/DIAG INJ IV PUSH: CPT

## 2024-12-17 PROCEDURE — 160047 HCHG PACU  - EA ADDL 30 MINS PHASE II: Performed by: STUDENT IN AN ORGANIZED HEALTH CARE EDUCATION/TRAINING PROGRAM

## 2024-12-17 PROCEDURE — 700111 HCHG RX REV CODE 636 W/ 250 OVERRIDE (IP): Mod: UD | Performed by: EMERGENCY MEDICINE

## 2024-12-17 PROCEDURE — 700102 HCHG RX REV CODE 250 W/ 637 OVERRIDE(OP): Performed by: STUDENT IN AN ORGANIZED HEALTH CARE EDUCATION/TRAINING PROGRAM

## 2024-12-17 PROCEDURE — 96375 TX/PRO/DX INJ NEW DRUG ADDON: CPT

## 2024-12-17 PROCEDURE — 99221 1ST HOSP IP/OBS SF/LOW 40: CPT | Mod: 25 | Performed by: STUDENT IN AN ORGANIZED HEALTH CARE EDUCATION/TRAINING PROGRAM

## 2024-12-17 PROCEDURE — 74176 CT ABD & PELVIS W/O CONTRAST: CPT

## 2024-12-17 PROCEDURE — 99223 1ST HOSP IP/OBS HIGH 75: CPT | Performed by: HOSPITALIST

## 2024-12-17 PROCEDURE — 160048 HCHG OR STATISTICAL LEVEL 1-5: Performed by: STUDENT IN AN ORGANIZED HEALTH CARE EDUCATION/TRAINING PROGRAM

## 2024-12-17 PROCEDURE — 700105 HCHG RX REV CODE 258: Performed by: EMERGENCY MEDICINE

## 2024-12-17 PROCEDURE — 160009 HCHG ANES TIME/MIN: Performed by: STUDENT IN AN ORGANIZED HEALTH CARE EDUCATION/TRAINING PROGRAM

## 2024-12-17 PROCEDURE — 160046 HCHG PACU - 1ST 60 MINS PHASE II: Performed by: STUDENT IN AN ORGANIZED HEALTH CARE EDUCATION/TRAINING PROGRAM

## 2024-12-17 PROCEDURE — 700105 HCHG RX REV CODE 258: Performed by: HOSPITALIST

## 2024-12-17 PROCEDURE — A9270 NON-COVERED ITEM OR SERVICE: HCPCS | Performed by: HOSPITALIST

## 2024-12-17 PROCEDURE — C2617 STENT, NON-COR, TEM W/O DEL: HCPCS | Performed by: STUDENT IN AN ORGANIZED HEALTH CARE EDUCATION/TRAINING PROGRAM

## 2024-12-17 PROCEDURE — 700111 HCHG RX REV CODE 636 W/ 250 OVERRIDE (IP): Mod: JZ | Performed by: HOSPITALIST

## 2024-12-17 PROCEDURE — 85025 COMPLETE CBC W/AUTO DIFF WBC: CPT

## 2024-12-17 PROCEDURE — 160039 HCHG SURGERY MINUTES - EA ADDL 1 MIN LEVEL 3: Performed by: STUDENT IN AN ORGANIZED HEALTH CARE EDUCATION/TRAINING PROGRAM

## 2024-12-17 PROCEDURE — 81001 URINALYSIS AUTO W/SCOPE: CPT

## 2024-12-17 PROCEDURE — A9270 NON-COVERED ITEM OR SERVICE: HCPCS | Performed by: STUDENT IN AN ORGANIZED HEALTH CARE EDUCATION/TRAINING PROGRAM

## 2024-12-17 PROCEDURE — 700111 HCHG RX REV CODE 636 W/ 250 OVERRIDE (IP): Performed by: STUDENT IN AN ORGANIZED HEALTH CARE EDUCATION/TRAINING PROGRAM

## 2024-12-17 PROCEDURE — 770006 HCHG ROOM/CARE - MED/SURG/GYN SEMI*

## 2024-12-17 PROCEDURE — 700111 HCHG RX REV CODE 636 W/ 250 OVERRIDE (IP): Performed by: HOSPITALIST

## 2024-12-17 PROCEDURE — C1769 GUIDE WIRE: HCPCS | Performed by: STUDENT IN AN ORGANIZED HEALTH CARE EDUCATION/TRAINING PROGRAM

## 2024-12-17 PROCEDURE — 700101 HCHG RX REV CODE 250: Performed by: EMERGENCY MEDICINE

## 2024-12-17 PROCEDURE — 36415 COLL VENOUS BLD VENIPUNCTURE: CPT

## 2024-12-17 PROCEDURE — 80053 COMPREHEN METABOLIC PANEL: CPT

## 2024-12-17 PROCEDURE — 84703 CHORIONIC GONADOTROPIN ASSAY: CPT

## 2024-12-17 PROCEDURE — 160025 RECOVERY II MINUTES (STATS): Performed by: STUDENT IN AN ORGANIZED HEALTH CARE EDUCATION/TRAINING PROGRAM

## 2024-12-17 PROCEDURE — 160028 HCHG SURGERY MINUTES - 1ST 30 MINS LEVEL 3: Performed by: STUDENT IN AN ORGANIZED HEALTH CARE EDUCATION/TRAINING PROGRAM

## 2024-12-17 PROCEDURE — 160035 HCHG PACU - 1ST 60 MINS PHASE I: Performed by: STUDENT IN AN ORGANIZED HEALTH CARE EDUCATION/TRAINING PROGRAM

## 2024-12-17 PROCEDURE — 160002 HCHG RECOVERY MINUTES (STAT): Performed by: STUDENT IN AN ORGANIZED HEALTH CARE EDUCATION/TRAINING PROGRAM

## 2024-12-17 PROCEDURE — 99285 EMERGENCY DEPT VISIT HI MDM: CPT

## 2024-12-17 PROCEDURE — 83690 ASSAY OF LIPASE: CPT

## 2024-12-17 PROCEDURE — 52332 CYSTOSCOPY AND TREATMENT: CPT | Mod: RT | Performed by: STUDENT IN AN ORGANIZED HEALTH CARE EDUCATION/TRAINING PROGRAM

## 2024-12-17 DEVICE — STENT UROLOGICAL POLARIS 6X28 ULTRA: Type: IMPLANTABLE DEVICE | Site: URETER | Status: FUNCTIONAL

## 2024-12-17 RX ORDER — LIDOCAINE HYDROCHLORIDE 20 MG/ML
INJECTION, SOLUTION EPIDURAL; INFILTRATION; INTRACAUDAL; PERINEURAL PRN
Status: DISCONTINUED | OUTPATIENT
Start: 2024-12-17 | End: 2024-12-17 | Stop reason: SURG

## 2024-12-17 RX ORDER — ONDANSETRON 2 MG/ML
4 INJECTION INTRAMUSCULAR; INTRAVENOUS ONCE
Status: COMPLETED | OUTPATIENT
Start: 2024-12-17 | End: 2024-12-17

## 2024-12-17 RX ORDER — HYDROMORPHONE HYDROCHLORIDE 1 MG/ML
0.1 INJECTION, SOLUTION INTRAMUSCULAR; INTRAVENOUS; SUBCUTANEOUS
Status: DISCONTINUED | OUTPATIENT
Start: 2024-12-17 | End: 2024-12-17 | Stop reason: HOSPADM

## 2024-12-17 RX ORDER — POTASSIUM CHLORIDE 1500 MG/1
40 TABLET, EXTENDED RELEASE ORAL ONCE
Status: COMPLETED | OUTPATIENT
Start: 2024-12-17 | End: 2024-12-17

## 2024-12-17 RX ORDER — HYDROMORPHONE HYDROCHLORIDE 2 MG/ML
INJECTION, SOLUTION INTRAMUSCULAR; INTRAVENOUS; SUBCUTANEOUS PRN
Status: DISCONTINUED | OUTPATIENT
Start: 2024-12-17 | End: 2024-12-17 | Stop reason: SURG

## 2024-12-17 RX ORDER — HYDROMORPHONE HYDROCHLORIDE 1 MG/ML
0.2 INJECTION, SOLUTION INTRAMUSCULAR; INTRAVENOUS; SUBCUTANEOUS
Status: DISCONTINUED | OUTPATIENT
Start: 2024-12-17 | End: 2024-12-17 | Stop reason: HOSPADM

## 2024-12-17 RX ORDER — PROMETHAZINE HYDROCHLORIDE 25 MG/1
12.5-25 TABLET ORAL EVERY 4 HOURS PRN
Status: DISCONTINUED | OUTPATIENT
Start: 2024-12-17 | End: 2024-12-18 | Stop reason: HOSPADM

## 2024-12-17 RX ORDER — MORPHINE SULFATE 4 MG/ML
4 INJECTION INTRAVENOUS ONCE
Status: COMPLETED | OUTPATIENT
Start: 2024-12-17 | End: 2024-12-17

## 2024-12-17 RX ORDER — LABETALOL HYDROCHLORIDE 5 MG/ML
5 INJECTION, SOLUTION INTRAVENOUS
Status: DISCONTINUED | OUTPATIENT
Start: 2024-12-17 | End: 2024-12-17 | Stop reason: HOSPADM

## 2024-12-17 RX ORDER — FLUTICASONE PROPIONATE AND SALMETEROL 100; 50 UG/1; UG/1
1 POWDER RESPIRATORY (INHALATION)
Status: DISCONTINUED | OUTPATIENT
Start: 2024-12-17 | End: 2024-12-17

## 2024-12-17 RX ORDER — PHENAZOPYRIDINE HYDROCHLORIDE 100 MG/1
100 TABLET, FILM COATED ORAL
Status: DISCONTINUED | OUTPATIENT
Start: 2024-12-17 | End: 2024-12-18 | Stop reason: HOSPADM

## 2024-12-17 RX ORDER — HYDROMORPHONE HYDROCHLORIDE 1 MG/ML
0.4 INJECTION, SOLUTION INTRAMUSCULAR; INTRAVENOUS; SUBCUTANEOUS
Status: DISCONTINUED | OUTPATIENT
Start: 2024-12-17 | End: 2024-12-17 | Stop reason: HOSPADM

## 2024-12-17 RX ORDER — HYDROXYZINE HYDROCHLORIDE 50 MG/1
50 TABLET, FILM COATED ORAL EVERY 8 HOURS PRN
Status: DISCONTINUED | OUTPATIENT
Start: 2024-12-17 | End: 2024-12-18 | Stop reason: HOSPADM

## 2024-12-17 RX ORDER — ONDANSETRON 2 MG/ML
4 INJECTION INTRAMUSCULAR; INTRAVENOUS
Status: DISCONTINUED | OUTPATIENT
Start: 2024-12-17 | End: 2024-12-17 | Stop reason: HOSPADM

## 2024-12-17 RX ORDER — BUPROPION HYDROCHLORIDE 150 MG/1
150 TABLET, EXTENDED RELEASE ORAL 2 TIMES DAILY
Status: DISCONTINUED | OUTPATIENT
Start: 2024-12-17 | End: 2024-12-18 | Stop reason: HOSPADM

## 2024-12-17 RX ORDER — ONDANSETRON 4 MG/1
4 TABLET, ORALLY DISINTEGRATING ORAL EVERY 4 HOURS PRN
Status: DISCONTINUED | OUTPATIENT
Start: 2024-12-17 | End: 2024-12-18 | Stop reason: HOSPADM

## 2024-12-17 RX ORDER — ACETAMINOPHEN 325 MG/1
650 TABLET ORAL EVERY 6 HOURS PRN
Status: DISCONTINUED | OUTPATIENT
Start: 2024-12-17 | End: 2024-12-18 | Stop reason: HOSPADM

## 2024-12-17 RX ORDER — KETOROLAC TROMETHAMINE 15 MG/ML
15 INJECTION, SOLUTION INTRAMUSCULAR; INTRAVENOUS ONCE
Status: COMPLETED | OUTPATIENT
Start: 2024-12-17 | End: 2024-12-17

## 2024-12-17 RX ORDER — KETOROLAC TROMETHAMINE 15 MG/ML
15 INJECTION, SOLUTION INTRAMUSCULAR; INTRAVENOUS EVERY 6 HOURS PRN
Status: DISCONTINUED | OUTPATIENT
Start: 2024-12-17 | End: 2024-12-18 | Stop reason: HOSPADM

## 2024-12-17 RX ORDER — AMLODIPINE BESYLATE 10 MG/1
10 TABLET ORAL
Status: DISCONTINUED | OUTPATIENT
Start: 2024-12-17 | End: 2024-12-18 | Stop reason: HOSPADM

## 2024-12-17 RX ORDER — CEFAZOLIN SODIUM 1 G/3ML
INJECTION, POWDER, FOR SOLUTION INTRAMUSCULAR; INTRAVENOUS PRN
Status: DISCONTINUED | OUTPATIENT
Start: 2024-12-17 | End: 2024-12-17 | Stop reason: SURG

## 2024-12-17 RX ORDER — METOPROLOL TARTRATE 1 MG/ML
1 INJECTION, SOLUTION INTRAVENOUS
Status: DISCONTINUED | OUTPATIENT
Start: 2024-12-17 | End: 2024-12-17 | Stop reason: HOSPADM

## 2024-12-17 RX ORDER — DEXAMETHASONE SODIUM PHOSPHATE 4 MG/ML
INJECTION, SOLUTION INTRA-ARTICULAR; INTRALESIONAL; INTRAMUSCULAR; INTRAVENOUS; SOFT TISSUE PRN
Status: DISCONTINUED | OUTPATIENT
Start: 2024-12-17 | End: 2024-12-17 | Stop reason: SURG

## 2024-12-17 RX ORDER — PROMETHAZINE HYDROCHLORIDE 25 MG/1
12.5-25 SUPPOSITORY RECTAL EVERY 4 HOURS PRN
Status: DISCONTINUED | OUTPATIENT
Start: 2024-12-17 | End: 2024-12-18 | Stop reason: HOSPADM

## 2024-12-17 RX ORDER — PROCHLORPERAZINE EDISYLATE 5 MG/ML
5-10 INJECTION INTRAMUSCULAR; INTRAVENOUS EVERY 4 HOURS PRN
Status: DISCONTINUED | OUTPATIENT
Start: 2024-12-17 | End: 2024-12-18 | Stop reason: HOSPADM

## 2024-12-17 RX ORDER — EPHEDRINE SULFATE 50 MG/ML
5 INJECTION, SOLUTION INTRAVENOUS
Status: DISCONTINUED | OUTPATIENT
Start: 2024-12-17 | End: 2024-12-17 | Stop reason: HOSPADM

## 2024-12-17 RX ORDER — TAMSULOSIN HYDROCHLORIDE 0.4 MG/1
0.4 CAPSULE ORAL
Status: DISCONTINUED | OUTPATIENT
Start: 2024-12-17 | End: 2024-12-18 | Stop reason: HOSPADM

## 2024-12-17 RX ORDER — DEXTROSE MONOHYDRATE 25 G/50ML
25 INJECTION, SOLUTION INTRAVENOUS
Status: DISCONTINUED | OUTPATIENT
Start: 2024-12-17 | End: 2024-12-18 | Stop reason: HOSPADM

## 2024-12-17 RX ORDER — SODIUM CHLORIDE 9 MG/ML
1000 INJECTION, SOLUTION INTRAVENOUS ONCE
Status: COMPLETED | OUTPATIENT
Start: 2024-12-17 | End: 2024-12-17

## 2024-12-17 RX ORDER — ALBUTEROL SULFATE 90 UG/1
1 INHALANT RESPIRATORY (INHALATION) EVERY 4 HOURS PRN
Status: DISCONTINUED | OUTPATIENT
Start: 2024-12-17 | End: 2024-12-18 | Stop reason: HOSPADM

## 2024-12-17 RX ORDER — OXYCODONE HYDROCHLORIDE 10 MG/1
10 TABLET ORAL
Status: DISCONTINUED | OUTPATIENT
Start: 2024-12-17 | End: 2024-12-18 | Stop reason: HOSPADM

## 2024-12-17 RX ORDER — CARVEDILOL 25 MG/1
25 TABLET ORAL 2 TIMES DAILY WITH MEALS
Status: DISCONTINUED | OUTPATIENT
Start: 2024-12-17 | End: 2024-12-18 | Stop reason: HOSPADM

## 2024-12-17 RX ORDER — KETOROLAC TROMETHAMINE 30 MG/ML
INJECTION, SOLUTION INTRAMUSCULAR; INTRAVENOUS PRN
Status: DISCONTINUED | OUTPATIENT
Start: 2024-12-17 | End: 2024-12-17 | Stop reason: SURG

## 2024-12-17 RX ORDER — DIPHENHYDRAMINE HYDROCHLORIDE 50 MG/ML
12.5 INJECTION INTRAMUSCULAR; INTRAVENOUS
Status: DISCONTINUED | OUTPATIENT
Start: 2024-12-17 | End: 2024-12-17 | Stop reason: HOSPADM

## 2024-12-17 RX ORDER — OXYCODONE HYDROCHLORIDE 5 MG/1
5 TABLET ORAL
Status: DISCONTINUED | OUTPATIENT
Start: 2024-12-17 | End: 2024-12-18 | Stop reason: HOSPADM

## 2024-12-17 RX ORDER — OXYCODONE HCL 5 MG/5 ML
5 SOLUTION, ORAL ORAL
Status: DISCONTINUED | OUTPATIENT
Start: 2024-12-17 | End: 2024-12-17 | Stop reason: HOSPADM

## 2024-12-17 RX ORDER — ONDANSETRON 2 MG/ML
4 INJECTION INTRAMUSCULAR; INTRAVENOUS EVERY 4 HOURS PRN
Status: DISCONTINUED | OUTPATIENT
Start: 2024-12-17 | End: 2024-12-18 | Stop reason: HOSPADM

## 2024-12-17 RX ORDER — HALOPERIDOL 5 MG/ML
1 INJECTION INTRAMUSCULAR
Status: DISCONTINUED | OUTPATIENT
Start: 2024-12-17 | End: 2024-12-17 | Stop reason: HOSPADM

## 2024-12-17 RX ORDER — SODIUM CHLORIDE 9 MG/ML
INJECTION, SOLUTION INTRAVENOUS CONTINUOUS
Status: DISCONTINUED | OUTPATIENT
Start: 2024-12-17 | End: 2024-12-18 | Stop reason: HOSPADM

## 2024-12-17 RX ORDER — HYDROMORPHONE HYDROCHLORIDE 1 MG/ML
1 INJECTION, SOLUTION INTRAMUSCULAR; INTRAVENOUS; SUBCUTANEOUS
Status: DISCONTINUED | OUTPATIENT
Start: 2024-12-17 | End: 2024-12-18 | Stop reason: HOSPADM

## 2024-12-17 RX ORDER — INSULIN LISPRO 100 [IU]/ML
2-9 INJECTION, SOLUTION INTRAVENOUS; SUBCUTANEOUS EVERY 6 HOURS
Status: DISCONTINUED | OUTPATIENT
Start: 2024-12-17 | End: 2024-12-18 | Stop reason: HOSPADM

## 2024-12-17 RX ORDER — OXYCODONE HCL 5 MG/5 ML
10 SOLUTION, ORAL ORAL
Status: DISCONTINUED | OUTPATIENT
Start: 2024-12-17 | End: 2024-12-17 | Stop reason: HOSPADM

## 2024-12-17 RX ORDER — QUETIAPINE FUMARATE 100 MG/1
200 TABLET, FILM COATED ORAL
Status: DISCONTINUED | OUTPATIENT
Start: 2024-12-17 | End: 2024-12-18 | Stop reason: HOSPADM

## 2024-12-17 RX ADMIN — HYDROMORPHONE HYDROCHLORIDE 1 MG: 2 INJECTION INTRAMUSCULAR; INTRAVENOUS; SUBCUTANEOUS at 14:58

## 2024-12-17 RX ADMIN — LIDOCAINE HYDROCHLORIDE 100 MG: 20 INJECTION, SOLUTION EPIDURAL; INFILTRATION; INTRACAUDAL; PERINEURAL at 14:21

## 2024-12-17 RX ADMIN — KETAMINE HYDROCHLORIDE 25 MG: 10 INJECTION INTRAMUSCULAR; INTRAVENOUS at 06:16

## 2024-12-17 RX ADMIN — MOMETASONE FUROATE AND FORMOTEROL FUMARATE DIHYDRATE 2 PUFF: 50; 5 AEROSOL RESPIRATORY (INHALATION) at 08:18

## 2024-12-17 RX ADMIN — KETOROLAC TROMETHAMINE 15 MG: 15 INJECTION, SOLUTION INTRAMUSCULAR; INTRAVENOUS at 20:35

## 2024-12-17 RX ADMIN — PROPOFOL 30 MG: 10 INJECTION, EMULSION INTRAVENOUS at 14:58

## 2024-12-17 RX ADMIN — KETOROLAC TROMETHAMINE 15 MG: 30 INJECTION, SOLUTION INTRAMUSCULAR; INTRAVENOUS at 14:23

## 2024-12-17 RX ADMIN — FENTANYL CITRATE 50 MCG: 50 INJECTION, SOLUTION INTRAMUSCULAR; INTRAVENOUS at 14:23

## 2024-12-17 RX ADMIN — MORPHINE SULFATE 4 MG: 4 INJECTION INTRAVENOUS at 05:20

## 2024-12-17 RX ADMIN — FENTANYL CITRATE 50 MCG: 50 INJECTION, SOLUTION INTRAMUSCULAR; INTRAVENOUS at 14:34

## 2024-12-17 RX ADMIN — PROPOFOL 150 MG: 10 INJECTION, EMULSION INTRAVENOUS at 14:23

## 2024-12-17 RX ADMIN — HYDROMORPHONE HYDROCHLORIDE 1 MG: 1 INJECTION, SOLUTION INTRAMUSCULAR; INTRAVENOUS; SUBCUTANEOUS at 12:53

## 2024-12-17 RX ADMIN — SUGAMMADEX 200 MG: 100 INJECTION, SOLUTION INTRAVENOUS at 14:58

## 2024-12-17 RX ADMIN — BUPROPION HYDROCHLORIDE 150 MG: 150 TABLET, FILM COATED, EXTENDED RELEASE ORAL at 18:06

## 2024-12-17 RX ADMIN — HYDROMORPHONE HYDROCHLORIDE 1 MG: 1 INJECTION, SOLUTION INTRAMUSCULAR; INTRAVENOUS; SUBCUTANEOUS at 07:20

## 2024-12-17 RX ADMIN — DEXAMETHASONE SODIUM PHOSPHATE 4 MG: 4 INJECTION INTRA-ARTICULAR; INTRALESIONAL; INTRAMUSCULAR; INTRAVENOUS; SOFT TISSUE at 14:23

## 2024-12-17 RX ADMIN — BUPROPION HYDROCHLORIDE 150 MG: 150 TABLET, FILM COATED, EXTENDED RELEASE ORAL at 06:36

## 2024-12-17 RX ADMIN — AMLODIPINE BESYLATE 10 MG: 10 TABLET ORAL at 06:36

## 2024-12-17 RX ADMIN — TAMSULOSIN HYDROCHLORIDE 0.4 MG: 0.4 CAPSULE ORAL at 08:19

## 2024-12-17 RX ADMIN — PHENAZOPYRIDINE 100 MG: 100 TABLET ORAL at 18:05

## 2024-12-17 RX ADMIN — ONDANSETRON 4 MG: 2 INJECTION INTRAMUSCULAR; INTRAVENOUS at 05:21

## 2024-12-17 RX ADMIN — SODIUM CHLORIDE 1000 ML: 9 INJECTION, SOLUTION INTRAVENOUS at 06:18

## 2024-12-17 RX ADMIN — OXYCODONE HYDROCHLORIDE 10 MG: 10 TABLET ORAL at 10:26

## 2024-12-17 RX ADMIN — ROCURONIUM BROMIDE 50 MG: 10 INJECTION, SOLUTION INTRAVENOUS at 14:23

## 2024-12-17 RX ADMIN — POTASSIUM CHLORIDE 40 MEQ: 1500 TABLET, EXTENDED RELEASE ORAL at 06:12

## 2024-12-17 RX ADMIN — CEFAZOLIN 2 G: 1 INJECTION, POWDER, FOR SOLUTION INTRAMUSCULAR; INTRAVENOUS at 14:26

## 2024-12-17 RX ADMIN — KETOROLAC TROMETHAMINE 15 MG: 15 INJECTION, SOLUTION INTRAMUSCULAR; INTRAVENOUS at 05:33

## 2024-12-17 RX ADMIN — ONDANSETRON 4 MG: 2 INJECTION INTRAMUSCULAR; INTRAVENOUS at 14:23

## 2024-12-17 RX ADMIN — SODIUM CHLORIDE: 9 INJECTION, SOLUTION INTRAVENOUS at 08:20

## 2024-12-17 RX ADMIN — CARVEDILOL 25 MG: 25 TABLET, FILM COATED ORAL at 07:41

## 2024-12-17 SDOH — ECONOMIC STABILITY: TRANSPORTATION INSECURITY
IN THE PAST 12 MONTHS, HAS THE LACK OF TRANSPORTATION KEPT YOU FROM MEDICAL APPOINTMENTS OR FROM GETTING MEDICATIONS?: YES

## 2024-12-17 SDOH — ECONOMIC STABILITY: TRANSPORTATION INSECURITY
IN THE PAST 12 MONTHS, HAS LACK OF RELIABLE TRANSPORTATION KEPT YOU FROM MEDICAL APPOINTMENTS, MEETINGS, WORK OR FROM GETTING THINGS NEEDED FOR DAILY LIVING?: YES

## 2024-12-17 ASSESSMENT — ENCOUNTER SYMPTOMS
ORTHOPNEA: 0
HEARTBURN: 0
WHEEZING: 0
EYE PAIN: 0
NECK PAIN: 0
MYALGIAS: 0
SORE THROAT: 0
COUGH: 0
PALPITATIONS: 0
CONSTIPATION: 0
PHOTOPHOBIA: 0
ABDOMINAL PAIN: 1
TINGLING: 0
DIAPHORESIS: 0
FALLS: 0
VOMITING: 1
SHORTNESS OF BREATH: 0
BLURRED VISION: 0
SINUS PAIN: 0
DIARRHEA: 0
FEVER: 0
POLYDIPSIA: 0
DOUBLE VISION: 0
BLOOD IN STOOL: 0
BACK PAIN: 0
HEMOPTYSIS: 0
HEADACHES: 0
WEAKNESS: 0
DEPRESSION: 0
HALLUCINATIONS: 0
STRIDOR: 0
VOMITING: 0
PND: 0
TREMORS: 0
DIZZINESS: 0
BRUISES/BLEEDS EASILY: 0
FLANK PAIN: 1
CHILLS: 1
NAUSEA: 1
FLANK PAIN: 0
SPUTUM PRODUCTION: 0
CLAUDICATION: 0

## 2024-12-17 ASSESSMENT — PAIN DESCRIPTION - PAIN TYPE
TYPE: ACUTE PAIN

## 2024-12-17 ASSESSMENT — COGNITIVE AND FUNCTIONAL STATUS - GENERAL
SUGGESTED CMS G CODE MODIFIER DAILY ACTIVITY: CH
SUGGESTED CMS G CODE MODIFIER MOBILITY: CH
MOBILITY SCORE: 24
DAILY ACTIVITIY SCORE: 24

## 2024-12-17 ASSESSMENT — SOCIAL DETERMINANTS OF HEALTH (SDOH)
WITHIN THE LAST YEAR, HAVE YOU BEEN KICKED, HIT, SLAPPED, OR OTHERWISE PHYSICALLY HURT BY YOUR PARTNER OR EX-PARTNER?: NO
IN THE PAST 12 MONTHS, HAS THE ELECTRIC, GAS, OIL, OR WATER COMPANY THREATENED TO SHUT OFF SERVICE IN YOUR HOME?: YES
WITHIN THE LAST YEAR, HAVE YOU BEEN AFRAID OF YOUR PARTNER OR EX-PARTNER?: NO
WITHIN THE LAST YEAR, HAVE TO BEEN RAPED OR FORCED TO HAVE ANY KIND OF SEXUAL ACTIVITY BY YOUR PARTNER OR EX-PARTNER?: NO
WITHIN THE PAST 12 MONTHS, YOU WORRIED THAT YOUR FOOD WOULD RUN OUT BEFORE YOU GOT THE MONEY TO BUY MORE: NEVER TRUE
WITHIN THE LAST YEAR, HAVE YOU BEEN HUMILIATED OR EMOTIONALLY ABUSED IN OTHER WAYS BY YOUR PARTNER OR EX-PARTNER?: NO
WITHIN THE PAST 12 MONTHS, THE FOOD YOU BOUGHT JUST DIDN'T LAST AND YOU DIDN'T HAVE MONEY TO GET MORE: NEVER TRUE

## 2024-12-17 ASSESSMENT — LIFESTYLE VARIABLES: SUBSTANCE_ABUSE: 0

## 2024-12-17 ASSESSMENT — FIBROSIS 4 INDEX: FIB4 SCORE: 0.35

## 2024-12-17 ASSESSMENT — PAIN SCALES - GENERAL: PAIN_LEVEL: 2

## 2024-12-17 NOTE — ASSESSMENT & PLAN NOTE
7.5 mm stone on the right proximal ureter  Pain control with oral and IV narcotics and will to monitor mental and respiratory status closely  Toradol   Flomax   IV hydration  UA pending  Urology has been consulted

## 2024-12-17 NOTE — OP REPORT
SURGEON: Dr. Karolyn Servin      ANESTHESIA: General (general endotracheal tube)      PRE-OPERATIVE DIAGNOSIS: right ureteral stone    POST-OPERATIVE DIAGNOSIS: Same      NAME OF PROCEDURE: Cystoscopy, right  [id number]     FINDINGS OF PROCEDURE: cloudy drainage of urine from stent, good proximal and distal curl     EBL: 0cc      COMPLICATIONS: None      PATIENT CONDITION: stable      INDICATIONS: Jennifer Huffman is a 44 y.o. female who agreed to above procedure for further management of kidney stones after complete discussion of risks, benefits, and alternatives.      PROCEDURE:     After informed consent was obtained in the preoperative care unit, the patient was taken to the OR on a stretcher. The patient was properly identified and placed in supine position per OR protocol. The patient was given a prophylactic dose of ancef 2 grams. General (general endotracheal tube) was administered. The patient was then placed in dorsal lithotomy, prepped and draped in a standard sterile fashion.  A timeout was performed with all parties in agreement.       A 22Fr rigid cystoscope was inserted per urethra. A sensor wire was passed into the right ureteral orifice up to the level of the kidney, confirmed under fluoroscopy.     A [id number] JJ ureteral stent was passed over the sensor wire and into the kidney, with it’s position confirmed under fluoroscopic guidance. The wire was removed and a good proximal and distal curl were noted in the renal pelvis and bladder respectively with fluoroscopy. I drained the patient's bladder. This concluded the procedure. The patient tolerated it well and was transferred to the PACU in stable condition.        DISPOSITION: The patient will be observed overnight with plan for definitive stone management as an outpatient.

## 2024-12-17 NOTE — ANESTHESIA PREPROCEDURE EVALUATION
Case: [id number] Date/Time: 12/17/24 1345    Procedure: CYSTOSCOPY, WITH URETERAL STENT INSERTION (Right)    Location: [facility] ROOM 21 / SURGERY SAME DAY [facility]    Surgeons: Karolyn Servin M.D.            Relevant Problems   CARDIAC   (positive) Primary hypertension         (positive) Hydronephrosis   (positive) Nephrolithiasis      Other   (positive) DM (diabetes mellitus) (HCC)       Physical Exam    Airway   Mallampati: II  TM distance: >3 FB  Neck ROM: full       Cardiovascular - normal exam  Rhythm: regular  Rate: normal     Dental - normal exam           Pulmonary - normal exam  Breath sounds clear to auscultation     Abdominal    Neurological - normal exam                   Anesthesia Plan    ASA 3   ASA physical status 3 criteria: hypertension - poorly controlled and diabetes - poorly controlled    Plan - general       Airway plan will be LMA          Induction: intravenous    Postoperative Plan: Postoperative administration of opioids is intended.    Pertinent diagnostic labs and testing reviewed    Informed Consent:    Anesthetic plan and risks discussed with patient.    Use of blood products discussed with: patient whom consented to blood products.

## 2024-12-17 NOTE — OR NURSING
1506 pt arrived from OR report received from RN and anesthesia, VSS, no C/O pain.      1530 placed pt on bedpan, unable to go at this time.     1545 pt ambulated to bathroom with RN, pt tolerated well, pt was able to void, some blood noted in urine. Called and spoke with pts sister, updated family, all questions answered.     1600 called report to excepting RN on floor, per RN pt room dirty, will call when clean. Pt and family updated.     1709 pt taken up to room with transport and family.

## 2024-12-17 NOTE — ED NOTES
Assist RN: Patient given medication as per MAR, education given, patient verbalize understanding.    
Bedside report received from off going RN/tech: Seferino, assumed care of patient.  POC discussed with patient. Call light within reach, all needs addressed at this time.       Fall risk interventions in place: Move the patient closer to the nurse's station, Patient's personal possessions are with in their safe reach, and Place socks on patient (all applicable per Randall Fall risk assessment)   Continuous monitoring: Cardiac Leads, Pulse Ox, or Blood Pressure  IVF/IV medications: Infusion per MAR (List Med(s)) 999ml/hr  Oxygen: Room Air  Bedside sitter: Not Applicable   Isolation: Not Applicable    Patient states she has 10/10 in right side of stomach.        
ERP at bedside  
Med Rec completed per patient and family at bedside     Allergies reviewed: yes    Antibiotics in the past 30 days: no    Anticoagulant in past 14 days: no    Pharmacy patient utilizes: [facility]      
Okay to take po meds with sips of water as per MD Pool  
Patient ambulatory to the bathroom with steady gait, urine sample sent to lab  
Patient medicated per MAR. BS 80  
Patient placed on 1 liter NC after pain medication.   
Patient placed on hospital bed. VSS BS: 95  Patient feeling chilled: temp 97.3    Medicated for 8/10 pain per mar.     All needs met.   
Patient states she feels like she is going to vomit. Given IV pain medication.   
Pt to CT via rebecca  
Rachana pre-op RN called. Report given. Patient will be going to surgery at 1400. Last solid food was at 8:00 am yesterday   Patient and family updated. On monitor and VSS  
Report given to Marleny  
01-Dec-2023 20:33

## 2024-12-17 NOTE — H&P (VIEW-ONLY)
Urology Consultation    Date of Service  12/17/2024    Referring Physician  Arturo Su*    Consulting Physician  Becka Ledezma P.A.-C.    Reason for Consultation  5 mm proximal RIGHT ureteric stone    History of Presenting Illness  44 y.o. female with PMH of kidney stones, who presented 12/17/2024 with RIGHT renal colic secondary to a 5 mm proximal RIGHT ureteric stone. She was last seen in the office by myself on 10/14/24, and I recommended updated upper tract imaging to assess stone burden. She had not arranged this imaging yet.     The patient states that RIGHT flank pain started approximately 3 days prior. Pain worsened last night and she had associated nausea and vomiting. She denies any fevers but endorses subjective chills. She denies any hematuria or dysuria.     Currently, pain has migrated to the RUQ. She reports that pain has not been controlled since arriving in the ED. She is currently NPO.    10/14/24 OV:  GLADYS   Jennifer Huffman is a 44 y.o. female with PMH of abnormal uterine bleeding followed by Dr Ash and mixed urinary incontinence followed by Dr Phelps, presenting to establish herself with [facility] secondary to a history of kidney stones.      The patient reports that she has had kidney stones prior. She has never required surgery for kidney stones. Her last kidney stone episode was a couple years ago. She is on topamax for migraines and has been on it for many years. Her last UT imaging was in 2013.     She reports incontinence with coughing and sneezing. She also reports leaking with urgency. She wears a pad daily and at night. She does notice leaking at night. She goes through 2-3 pads per day on average. She was evaluated by Dr Phelps, urogynecologist and was prescribed Myrbetriq 25 mg daily and referred to PFPT. She started the myrbetriq today and has no started PFPT yet.      She reports issues with AUB that started 2 months ago. She reports that her last period  lasted for 42 days. Additionally, she had a ruptured LEFT ovarian hemorrhagic cyst. She is currently menstruating today.     She does have microhematuria on last 3 micro UA, but does report she was bleeding vaginally during these tests.     She denies any history of gross hematuria or dysuria. Denies any family history of  CA. She is a lifelong non-smoker.      Her mother had urinary incontinence and her father has history of kidney stones.       -- vaginal     Review of Systems  Review of Systems   Constitutional:  Positive for chills. Negative for fever.   Gastrointestinal:  Positive for abdominal pain and nausea. Negative for vomiting.   Genitourinary:  Negative for dysuria, flank pain and hematuria.       Past Medical History   has a past medical history of Allergy, Anemia, Anxiety, Depression, Diabetes (HCC), Heart attack (HCC), Hypertension, Kidney stones, and Migraine.    Surgical History   has no past surgical history on file.    Family History  family history includes Cervical Cancer in her mother; Diabetes in her mother; Hypertension in her father and mother; Stroke in her father.    Social History   reports that she has never smoked. She has never used smokeless tobacco. She reports that she does not drink alcohol and does not use drugs.    Medications  Current Facility-Administered Medications   Medication Dose Route Frequency Provider Last Rate Last Admin    NS infusion   Intravenous Continuous Omid Pool M.D. 100 mL/hr at 24 0820 New Bag at 24 0820    acetaminophen (Tylenol) tablet 650 mg  650 mg Oral Q6HRS PRN Omid Pool M.D.        Pharmacy Consult Request ...Pain Management Review 1 Each  1 Each Other PHARMACY TO DOSE Omid Pool M.D.        oxyCODONE immediate-release (Roxicodone) tablet 5 mg  5 mg Oral Q3HRS PRN Omid Pool M.D.        Or    oxyCODONE immediate release (Roxicodone) tablet 10 mg  10 mg Oral Q3HRS PRN Omid Pool M.D.   10 mg at 24 1026    Or     HYDROmorphone (Dilaudid) injection 1 mg  1 mg Intravenous Q3HRS PRN Omid Pool M.D.   1 mg at 12/17/24 0720    ondansetron (Zofran) syringe/vial injection 4 mg  4 mg Intravenous Q4HRS PRN Omid Pool M.D.        ondansetron (Zofran ODT) dispertab 4 mg  4 mg Oral Q4HRS PRN Omid Pool M.D.        promethazine (Phenergan) tablet 12.5-25 mg  12.5-25 mg Oral Q4HRS PRN Omid Pool M.D.        promethazine (Phenergan) suppository 12.5-25 mg  12.5-25 mg Rectal Q4HRS PRN Omid Pool M.D.        prochlorperazine (Compazine) injection 5-10 mg  5-10 mg Intravenous Q4HRS PRN Omid Pool M.D.        ketorolac (Toradol) 15 MG/ML injection 15 mg  15 mg Intravenous Q6HRS PRN Omid Pool M.D.        albuterol inhaler 1 Puff  1 Puff Inhalation Q4HRS PRN Omid Pool M.D.        amLODIPine (Norvasc) tablet 10 mg  10 mg Oral Q DAY Omid Pool M.D.   10 mg at 12/17/24 0636    buPROPion SR (Wellbutrin-SR) tablet 150 mg  150 mg Oral BID Omid Pool M.D.   150 mg at 12/17/24 0636    carvedilol (Coreg) tablet 25 mg  25 mg Oral BID WITH MEALS Omid Pool M.D.   25 mg at 12/17/24 0741    hydrOXYzine HCl (Atarax) tablet 50 mg  50 mg Oral Q8HRS PRN Omid Pool M.D.        QUEtiapine (SEROquel) tablet 200 mg  200 mg Oral QHS Omid Pool M.D.        mometasone-formoterol (Dulera) 50-5 MCG/ACT inhaler 2 Puff  2 Puff Inhalation BID (RT) Omid Pool M.D.   2 Puff at 12/17/24 0818    insulin lispro (HumaLOG,AdmeLOG) subcutaneous injection  2-9 Units Subcutaneous Q6HRS Omid Pool M.D.        And    dextrose 50% (D50W) injection 25 g  25 g Intravenous Q15 MIN PRN Omid Pool M.D.        tamsulosin (Flomax) capsule 0.4 mg  0.4 mg Oral AFTER BREAKFAST Omid Pool M.D.   0.4 mg at 12/17/24 0819     Current Outpatient Medications   Medication Sig Dispense Refill    QUEtiapine (SEROQUEL) 200 MG Tab Take 200 mg by mouth at bedtime.      meclizine (ANTIVERT) 25 MG Tab Take 1 tablet by mouth 3 times a day as needed for  dizziness. (Patient taking differently: Take 25 mg by mouth 3 times a day as needed for Dizziness.) 42 Tablet 3    ferrous sulfate (FEROSUL) 325 (65 Fe) MG tablet Take 1 Tablet by mouth every day 30 Tablet 5    polyethylene glycol 3350 (MIRALAX) 17 GM/SCOOP Powder Take 1 scoop mixed with 8 ounces of fluid as directed 510 g 3    medroxyPROGESTERone (PROVERA) 10 MG Tab Take 1 pill daily with start of heavy bleeding with period. Can take 1 pill twice a day if still having heavy bleeding. 30 Tablet 1    Tirzepatide 15 MG/0.5ML Solution Auto-injector Inject 15 mg once a week startdate:10- (Patient taking differently: Inject 15 mg under the skin every 7 days.) 2 mL 6    amphetamine-dextroamphetamine (ADDERALL, 30MG,) 30 MG tablet Take 30 mg by mouth 2 times a day.      mirabegron ER (MYRBETRIQ) 25 MG TABLET SR 24 HR Take 1 Tablet by mouth every day for 360 days. 90 Tablet 3    Sennosides (SENNA) 8.6 MG Tab Take 2 tablets by mouth once daily take as needed to prevent constipation associated with iron supplements. StartDate : 07- 60 Tablet 2    topiramate (TOPAMAX) 100 MG Tab Take 1 tablet by mouth twice a day 60 Tablet 2    buPROPion (WELLBUTRIN XL) 300 MG XL tablet Take 1 tablet by mouth once a day 30 Tablet 2    hydrOXYzine pamoate (VISTARIL) 50 MG Cap Take 1 capsule by mouth 2 times a day (Patient taking differently: Take 50 mg by mouth 2 times a day as needed for Itching.) 60 Capsule 2    topiramate (TOPAMAX) 100 MG Tab Take 1 Tablet by mouth 2 times a day 60 Tablet 2    buPROPion (WELLBUTRIN XL) 300 MG XL tablet Take 1 Tablet by mouth every day 30 Tablet 2    hydrOXYzine pamoate (VISTARIL) 50 MG Cap Take 1 Capsule by mouth 2 times a day (Patient taking differently: Take 50 mg by mouth 2 times a day as needed for Itching.) 60 Capsule 2    topiramate (TOPAMAX) 100 MG Tab Take 1 Tablet by mouth 2 times a day 60 Tablet 2    fluticasone-salmeterol (ADVAIR HFA) 115-21 MCG/ACT inhaler Inhale 1 puff by mouth 2  times a day (Patient not taking: Reported on 12/17/2024) 12 g 12    amLODIPine (NORVASC) 10 MG Tab Take 1 Tablet by mouth once a day For high blood pressure. StartDate : 01- 30 Tablet 12    fluticasone-salmeterol (ADVAIR DISKUS) 100-50 MCG/ACT AEROSOL POWDER, BREATH ACTIVATED Inhale 1 puff by mouth twice a day for asthma. StartDate : 01- (Patient not taking: Reported on 12/17/2024) 60 Each 12    carvedilol (COREG) 25 MG Tab Take 1 Tablet by mouth once a day For blood pressure. StartDate : 01- (Patient taking differently: Take 25 mg by mouth every day.) 30 Tablet 12    albuterol (VENTOLIN HFA) 108 (90 Base) MCG/ACT Aero Soln inhalation aerosol Inhale 2 puffs by mouth every 4-6 hours as needed for shortness of breath 8.5 g 6       Allergies  Allergies   Allergen Reactions    Latex Rash    Tramadol Unspecified     Makes her sick         Physical Exam  Temp:  [36.3 °C (97.3 °F)-36.4 °C (97.6 °F)] 36.3 °C (97.3 °F)  Pulse:  [] 90  Resp:  [16-24] 17  BP: (127-197)/() 127/65  SpO2:  [88 %-98 %] 94 %    Physical Exam    Fluids      Laboratory  Recent Labs     12/17/24  0456   WBC 13.3*   RBC 4.70   HEMOGLOBIN 13.2   HEMATOCRIT 40.2   MCV 85.5   MCH 28.1   MCHC 32.8   RDW 45.3   PLATELETCT 505*   MPV 9.6     Recent Labs     12/17/24  0456   SODIUM 139   POTASSIUM 3.4*   CHLORIDE 105   CO2 19*   GLUCOSE 141*   BUN 9   CREATININE 0.92   CALCIUM 9.2                     Imaging  CT-RENAL COLIC EVALUATION(A/P W/O)   Final Result         1.  7.5 mm proximal right ureteral stone resulting in right urinary tract obstructive changes.   2.  Small fat-containing bilateral inguinal hernias          Assessment/Plan  44 y.o. female with PMH of kidney stones, who presented 12/17/2024 with RIGHT renal colic secondary to a 5 mm proximal RIGHT ureteric stone.    -- NPO  -- OR today for a cystoscopy and RIGHT ureteric stent insertion -- this was explained in detail to the patient, including the typical patient  experience  -- Pain control  -- IV abx per protocol  -- Tunde urine culture  -- Admit to hospitalist for observation  -- Urology to follow patient throughout hospital stay    Becka Ledezma PA-C

## 2024-12-17 NOTE — ED TRIAGE NOTES
"Chief Complaint   Patient presents with    Flank Pain    N/V     BIB EMS from home, reports progressing right side flank pain for 3 days with nausea and vomiting. Denies fever. Patient states unable to keep anything down. Has the same pain when she had kidney stones in the past. -dysuria -diarrhea     BP (!) 168/116   Pulse (!) 105   Resp 20   Ht 1.676 m (5' 6\")   Wt 104 kg (230 lb)   SpO2 95%   BMI 37.12 kg/m²     Pain: 10/10    Pt is alert and oriented x 4, speaking in full sentences, follows commands and responds appropriately to questions.     Respirations are even and unlabored.    "

## 2024-12-17 NOTE — ANESTHESIA PROCEDURE NOTES
Airway    Date/Time: 12/17/2024 2:25 PM    Performed by: Betty King M.D.  Authorized by: Betty King M.D.    Location:  OR  Urgency:  Elective  Indications for Airway Management:  Anesthesia      Spontaneous Ventilation: absent    Sedation Level:  Deep  Preoxygenated: Yes    Patient Position:  Sniffing  Final Airway Type:  Endotracheal airway  Final Endotracheal Airway:  ETT  Cuffed: Yes    Technique Used for Successful ETT Placement:  Direct laryngoscopy    Insertion Site:  Oral  Blade Type:  Srinivas  Laryngoscope Blade/Videolaryngoscope Blade Size:  3  ETT Size (mm):  6.5  Measured from:  Teeth  ETT to Teeth (cm):  22  Placement Verified by: auscultation and capnometry    Cormack-Lehane Classification:  Grade IIa - partial view of glottis  Number of Attempts at Approach:  1

## 2024-12-17 NOTE — ANESTHESIA TIME REPORT
Anesthesia Start and Stop Event Times       Date Time Event    12/17/2024 1246 Ready for Procedure     1413 Anesthesia Start     1511 Anesthesia Stop          Responsible Staff  12/17/24      Name Role Begin End    Betty King M.D. Anesth 1413 1511          Overtime Reason:  overtime    Comments:

## 2024-12-17 NOTE — ANESTHESIA POSTPROCEDURE EVALUATION
Patient: Jennifer Huffman    Procedure Summary       Date: 12/17/24 Room / Location: [facility] ROOM 21 / SURGERY SAME DAY [facility]    Anesthesia Start: 1413 Anesthesia Stop: 1511    Procedure: CYSTOSCOPY, WITH URETERAL STENT INSERTION (Right: Ureter) Diagnosis: (right ureteral kidney stone)    Surgeons: Karolyn Servin M.D. Responsible Provider: Betty King M.D.    Anesthesia Type: general ASA Status: 3            Final Anesthesia Type: general  Last vitals  BP   Blood Pressure: (!) 162/93    Temp   36.4 °C (97.6 °F)    Pulse   (!) 104   Resp   12    SpO2   98 %      Anesthesia Post Evaluation    Patient location during evaluation: PACU  Patient participation: complete - patient participated  Level of consciousness: awake and alert  Pain score: 2    Airway patency: patent  Anesthetic complications: no  Cardiovascular status: hemodynamically stable  Respiratory status: acceptable  Hydration status: euvolemic    PONV: none          No notable events documented.     Nurse Pain Score: 10 (NPRS)

## 2024-12-17 NOTE — ED PROVIDER NOTES
"ED Provider Note    Scribed for Dr. Bruner by Williams Alvarez. 12/17/2024,  5:06 AM.      CHIEF COMPLAINT  Chief Complaint   Patient presents with    Flank Pain    N/V     BIB EMS from home, reports progressing right side flank pain for 3 days with nausea and vomiting. Denies fever. Patient states unable to keep anything down. Has the same pain when she had kidney stones in the past. -dysuria -diarrhea       EXTERNAL RECORDS REVIEWED  Outpatient Notes [facility] urology note 10/14/2024 history of kidney stones    HPI  LIMITATION TO HISTORY   Select: : None    Jennifer Huffman is a 44 y.o. female with a history of kidney stones who presents to the Emergency Department for evaluation of constant flank pain radiating to the stomach onset three days ago. Her pain worsened in the last two hours, prompting her to call EMS.  The patient reports associated symptoms of vomiting today, constipation but denies hematemesis, dysuria, hematuria. The patient has taken naproxen for pain. She reports her last kidney stone was \"years ago.\" The patient has not required a cystoscopy for stones in the past. The patient is allergic to latex and tramadol.     REVIEW OF SYSTEMS  See HPI for further details. All other systems are negative.     PAST MEDICAL HISTORY     Past Medical History:   Diagnosis Date    Allergy     Anemia     Anxiety     Depression     Diabetes (HCC)     Heart attack (HCC)     heart attack @ age of 17?    Hypertension     Kidney stones     Migraine      SURGICAL HISTORY  History reviewed. No pertinent surgical history.    FAMILY HISTORY  Family History   Problem Relation Age of Onset    Hypertension Mother     Diabetes Mother     Cervical Cancer Mother     Stroke Father     Hypertension Father      SOCIAL HISTORY    reports that she has never smoked. She has never used smokeless tobacco. She reports that she does not drink alcohol and does not use drugs.    CURRENT MEDICATIONS  Home Medications       Reviewed by " Eddy Romero R.N. (Registered Nurse) on 12/17/24 at 0455  Med List Status: Not Addressed     Medication Last Dose Status   albuterol (VENTOLIN HFA) 108 (90 Base) MCG/ACT Aero Soln inhalation aerosol  Active   amLODIPine (NORVASC) 10 MG Tab  Active   amphetamine-dextroamphetamine (ADDERALL, 30MG,) 30 MG tablet  Active   buPROPion (WELLBUTRIN XL) 300 MG XL tablet  Active   buPROPion (WELLBUTRIN XL) 300 MG XL tablet  Active   buPROPion (WELLBUTRIN XL) 300 MG XL tablet  Active   buPROPion (WELLBUTRIN XL) 300 MG XL tablet  Active   carvedilol (COREG) 25 MG Tab  Active   ferrous sulfate (FEROSUL) 325 (65 Fe) MG tablet  Active   ferrous sulfate 325 (65 Fe) MG tablet  Active   fluticasone-salmeterol (ADVAIR DISKUS) 100-50 MCG/ACT AEROSOL POWDER, BREATH ACTIVATED  Active   fluticasone-salmeterol (ADVAIR HFA) 115-21 MCG/ACT inhaler  Active   hydrOXYzine pamoate (VISTARIL) 50 MG Cap  Active   hydrOXYzine pamoate (VISTARIL) 50 MG Cap  Active   meclizine (ANTIVERT) 25 MG Tab  Active   medroxyPROGESTERone (PROVERA) 10 MG Tab  Active   mirabegron ER (MYRBETRIQ) 25 MG TABLET SR 24 HR  Active   polyethylene glycol 3350 (MIRALAX) 17 GM/SCOOP Powder  Active   polyethylene glycol 3350 (MIRALAX) 17 GM/SCOOP Powder  Active   QUEtiapine (SEROQUEL) 100 MG Tab  Active   QUEtiapine (SEROQUEL) 200 MG Tab  Active   Sennosides (SENNA) 8.6 MG Tab  Active   Sennosides (SENNA) 8.6 MG Tab  Active   Tirzepatide (MOUNJARO) 15 MG/0.5ML Solution Pen-injector  Active   Tirzepatide 10 MG/0.5ML Solution Pen-injector  Active   Tirzepatide 15 MG/0.5ML Solution Auto-injector  Active   topiramate (TOPAMAX) 100 MG Tab  Active   topiramate (TOPAMAX) 100 MG Tab  Active   topiramate (TOPAMAX) 100 MG Tab  Active   topiramate (TOPAMAX) 100 MG Tab  Active   vitamin D2, Ergocalciferol, (DRISDOL) 1.25 MG (49831 UT) Cap capsule  Active                  ALLERGIES  Allergies   Allergen Reactions    Latex Rash    Tramadol Unspecified     Makes her sick    "    PHYSICAL EXAM  VITAL SIGNS: BP (!) 168/116   Pulse (!) 105   Temp 36.4 °C (97.6 °F) (Temporal)   Resp 20   Ht 1.676 m (5' 6\")   Wt 104 kg (230 lb)   SpO2 95%   BMI 37.12 kg/m²   Gen: Alert, uncomfortable appearing  HEENT: ATNC  Eyes: PERRL, EOMI, normal conjunctiva  Neck: trachea midline  Resp: no respiratory distress  CV: No JVD, regular rate and rhythm  Abd: non-distended, diffuse tenderness to the right side of the abdomen and right flank   Ext: No deformities  Neuro: speech fluent    DIAGNOSTIC STUDIES / PROCEDURES      LABS  Labs Reviewed   CBC WITH DIFFERENTIAL - Abnormal; Notable for the following components:       Result Value    WBC 13.3 (*)     Platelet Count 505 (*)     Neutrophils-Polys 83.50 (*)     Lymphocytes 9.80 (*)     Neutrophils (Absolute) 11.14 (*)     All other components within normal limits   COMP METABOLIC PANEL - Abnormal; Notable for the following components:    Potassium 3.4 (*)     Co2 19 (*)     Glucose 141 (*)     All other components within normal limits   LIPASE   HCG QUAL SERUM   ESTIMATED GFR   URINALYSIS,CULTURE IF INDICATED     RADIOLOGY  I have independently interpreted the diagnostic imaging associated with this visit.  My preliminary interpretation is as follows: CT renal colic: Proximal right ureterolithiasis  Radiologist interpretation:    CT-RENAL COLIC EVALUATION(A/P W/O)   Final Result         1.  7.5 mm proximal right ureteral stone resulting in right urinary tract obstructive changes.   2.  Small fat-containing bilateral inguinal hernias        Point of Care Ultrasound    Limited renal ultrasound  Indication: Flank and abdominal pain  Images obtained demonstrates hydronephrosis of the right kidney      Images retained below:                       COURSE & MEDICAL DECISION MAKING  Pertinent Labs & Imaging studies were reviewed. (See chart for details)  -----------    5:06 AM Patient seen and examined at bedside. Discussed my plan for CT imaging, labs to " evaluate and medication to treat patient symptoms.     INITIAL ASSESSMENT AND PLAN  Medical Decision Making: Patient presents with right-sided flank and abdominal pain with tenderness.  Given the tenderness, this is somewhat atypical for kidney stones.  No evidence for septic stone at this time.  Bedside ultrasound was performed which demonstrates hydronephrosis most consistent with a likely kidney stone.  The patient's will therefore go for a noncontrasted study as opposed to a contrasted study.    Labs demonstrate no acute kidney injury.  No evidence of sepsis.  My review of the CAT scan demonstrates a proximal right ureterolithiasis.  Due to the patient's intractable pain and multiple pain medications, I believe she require hospitalization and likely urologic involvement.  No signs of appendicitis, SBO, or other emergent condition    ADDITIONAL PROBLEM LIST AND DISPOSITION      I have discussed management of the patient with the following medical professionals: See above, below          Hydration: Patient received IV fluids for vomiting. Oral hydration alone was not sufficient. After IV fluids patient is improved.          Case discussed with Dr. Pool , who will evaluate the patient for hospitalization. Patient will be hospitalized in guarded condition.      FINAL IMPRESSION  1. Intractable pain    2. Ureterolithiasis             Williams ROME (Stephanie), am scribing for, and in the presence of, Javier Bruner M.D..    Electronically signed by: Williams Alvarez (Stephanie), 12/17/2024    Javier ROME M.D. personally performed the services described in this documentation, as scribed by Williams Alvarez in my presence, and it is both accurate and complete.    The note accurately reflects work and decisions made by me.  Javier Bruner M.D.  12/17/2024  6:00 AM      This dictation was created using voice recognition software. The accuracy of the dictation is limited to the abilities of the software. I expect there may be  some errors of grammar and possibly content. The nursing notes were reviewed and certain aspects of this information were incorporated into this note.

## 2024-12-17 NOTE — H&P
Hospital Medicine History & Physical Note    Date of Service  12/17/2024    Primary Care Physician  TIAN Pineda.    Consultants  urology    Specialist Names:     Code Status  Full Code    Chief Complaint  Chief Complaint   Patient presents with    Flank Pain    N/V     BIB EMS from home, reports progressing right side flank pain for 3 days with nausea and vomiting. Denies fever. Patient states unable to keep anything down. Has the same pain when she had kidney stones in the past. -dysuria -diarrhea       History of Presenting Illness  Jennifer Huffman is a 44 y.o. female who presented 12/17/2024 with past medical history of diabetes, hypertension, obesity, nephrolithiasis who presents to the hospital for right-sided flank pain for the past 3 days.  The pain is radiating down the right pelvic area.  It is associated with nausea, vomiting and chills.    She denies any dysuria or hematuria.  Patient's had nephrolithiasis in the past but they have passed on their own.    CT scan found a 7.5 mm stone on the right proximal ureter with hydronephrosis and hydroureter.  Perinephric fat stranding.    Patient has significant uncontrolled pain while in the ER and had tried multiple doses of morphine, Toradol.  Eventually she was given ketamine.    I discussed the plan of care with patient.    Review of Systems  Review of Systems   Constitutional:  Positive for chills. Negative for diaphoresis, fever and malaise/fatigue.   HENT:  Negative for congestion, ear discharge, ear pain, hearing loss, nosebleeds, sinus pain, sore throat and tinnitus.    Eyes:  Negative for blurred vision, double vision, photophobia and pain.   Respiratory:  Negative for cough, hemoptysis, sputum production, shortness of breath, wheezing and stridor.    Cardiovascular:  Negative for chest pain, palpitations, orthopnea, claudication, leg swelling and PND.   Gastrointestinal:  Positive for abdominal pain, nausea and vomiting. Negative  for blood in stool, constipation, diarrhea, heartburn and melena.   Genitourinary:  Positive for flank pain. Negative for dysuria, frequency, hematuria and urgency.   Musculoskeletal:  Negative for back pain, falls, joint pain, myalgias and neck pain.   Skin:  Negative for itching and rash.   Neurological:  Negative for dizziness, tingling, tremors, weakness and headaches.   Endo/Heme/Allergies:  Negative for environmental allergies and polydipsia. Does not bruise/bleed easily.   Psychiatric/Behavioral:  Negative for depression, hallucinations, substance abuse and suicidal ideas.        Past Medical History   has a past medical history of Allergy, Anemia, Anxiety, Depression, Diabetes (HCC), Heart attack (HCC), Hypertension, Kidney stones, and Migraine.    Surgical History  Surgical history reviewed is not pertinent    Family History  family history includes Cervical Cancer in her mother; Diabetes in her mother; Hypertension in her father and mother; Stroke in her father.   Family history reviewed with patient. There is no family history that is pertinent to the chief complaint.     Social History   reports that she has never smoked. She has never used smokeless tobacco. She reports that she does not drink alcohol and does not use drugs.    Allergies  Allergies   Allergen Reactions    Latex Rash    Tramadol Unspecified     Makes her sick         Medications  Prior to Admission Medications   Prescriptions Last Dose Informant Patient Reported? Taking?   QUEtiapine (SEROQUEL) 100 MG Tab   No No   Sig: Take 1 tablet by mouth 2 times a day   QUEtiapine (SEROQUEL) 200 MG Tab   Yes No   Sig: Take 200 mg by mouth at bedtime.   Sennosides (SENNA) 8.6 MG Tab   No No   Sig: Take 1 tab by mouth twice a day as needed for constipation associated with iron supplements.   Sennosides (SENNA) 8.6 MG Tab   No No   Sig: Take 2 tablets by mouth once daily take as needed to prevent constipation associated with iron supplements. StartDate  : 07-   Tirzepatide (MOUNJARO) 15 MG/0.5ML Solution Pen-injector   No No   Sig: Inject 15 mg subcutaneously once a week. Note increased dosage: 15 mg weekly. StartDate : 05-   Tirzepatide 10 MG/0.5ML Solution Pen-injector   No No   Sig: Inject 10 mg under the skin once a week   Tirzepatide 15 MG/0.5ML Solution Auto-injector   No No   Sig: Inject 15 mg once a week startdate:10-   albuterol (VENTOLIN HFA) 108 (90 Base) MCG/ACT Aero Soln inhalation aerosol   No No   Sig: Inhale 2 puffs by mouth every 4-6 hours as needed for shortness of breath   amLODIPine (NORVASC) 10 MG Tab   No No   Sig: Take 1 Tablet by mouth once a day For high blood pressure. StartDate : 01-   amphetamine-dextroamphetamine (ADDERALL, 30MG,) 30 MG tablet   Yes No   Sig: Take 30 mg by mouth 2 times a day.   buPROPion (WELLBUTRIN XL) 300 MG XL tablet   No No   Sig: Take 1 Tablet by mouth daily   buPROPion (WELLBUTRIN XL) 300 MG XL tablet   No No   Sig: Take 1 Tablet by mouth daily   buPROPion (WELLBUTRIN XL) 300 MG XL tablet   No No   Sig: Take 1 Tablet by mouth every day   buPROPion (WELLBUTRIN XL) 300 MG XL tablet   No No   Sig: Take 1 tablet by mouth once a day   carvedilol (COREG) 25 MG Tab   No No   Sig: Take 1 Tablet by mouth once a day For blood pressure. StartDate : 01-   ferrous sulfate (FEROSUL) 325 (65 Fe) MG tablet   No No   Sig: Take 1 Tablet by mouth every day   ferrous sulfate 325 (65 Fe) MG tablet   No No   Sig: Take 1 tablet orally once daily  For low iron levels. Take for 3 months. StartDate : 07-   fluticasone-salmeterol (ADVAIR DISKUS) 100-50 MCG/ACT AEROSOL POWDER, BREATH ACTIVATED   No No   Sig: Inhale 1 puff by mouth twice a day for asthma. StartDate : 01-   fluticasone-salmeterol (ADVAIR HFA) 115-21 MCG/ACT inhaler   No No   Sig: Inhale 1 puff by mouth 2 times a day   hydrOXYzine pamoate (VISTARIL) 50 MG Cap   No No   Sig: Take 1 Capsule by mouth 2 times a day   hydrOXYzine  pamoate (VISTARIL) 50 MG Cap   No No   Sig: Take 1 capsule by mouth 2 times a day   meclizine (ANTIVERT) 25 MG Tab   No No   Sig: Take 1 tablet by mouth 3 times a day as needed for dizziness.   medroxyPROGESTERone (PROVERA) 10 MG Tab   No No   Sig: Take 1 pill daily with start of heavy bleeding with period. Can take 1 pill twice a day if still having heavy bleeding.   mirabegron ER (MYRBETRIQ) 25 MG TABLET SR 24 HR   No No   Sig: Take 1 Tablet by mouth every day for 360 days.   polyethylene glycol 3350 (MIRALAX) 17 GM/SCOOP Powder   No No   Sig: Take 17 g by mouth daily for constipation. Dissolve in any 4 to 8 ounces of beverage (cold, hot or room temperature) then consume.   polyethylene glycol 3350 (MIRALAX) 17 GM/SCOOP Powder   No No   Sig: Take 1 scoop mixed with 8 ounces of fluid as directed   topiramate (TOPAMAX) 100 MG Tab   No No   Sig: Take 1 Tablet by mouth 2 times a day   topiramate (TOPAMAX) 100 MG Tab   No No   Sig: Take 1 Tablet by mouth 2 times a day   topiramate (TOPAMAX) 100 MG Tab   No No   Sig: Take 1 Tablet by mouth 2 times a day   topiramate (TOPAMAX) 100 MG Tab   No No   Sig: Take 1 tablet by mouth twice a day   vitamin D2, Ergocalciferol, (DRISDOL) 1.25 MG (83522 UT) Cap capsule   No No   Si Capsule Q1wk Take weekly for 12 weeks. For low vitamin d levels. StartDate : 2024      Facility-Administered Medications: None       Physical Exam  Temp:  [36.4 °C (97.6 °F)] 36.4 °C (97.6 °F)  Pulse:  [] 94  Resp:  [18-20] 18  BP: (166-168)/(102-116) 166/102  SpO2:  [95 %-97 %] 97 %  Blood Pressure: (!) 166/102   Temperature: 36.4 °C (97.6 °F)   Pulse: 94   Respiration: 18   Pulse Oximetry: 97 %       Physical Exam  Vitals and nursing note reviewed.   Constitutional:       General: She is not in acute distress.     Appearance: Normal appearance. She is obese. She is not ill-appearing, toxic-appearing or diaphoretic.   HENT:      Head: Normocephalic and atraumatic.      Nose: No congestion  "or rhinorrhea.      Mouth/Throat:      Pharynx: No oropharyngeal exudate or posterior oropharyngeal erythema.   Eyes:      General: No scleral icterus.  Neck:      Vascular: No carotid bruit or JVD.   Cardiovascular:      Rate and Rhythm: Normal rate and regular rhythm.      Pulses: Normal pulses.      Heart sounds: Normal heart sounds. No murmur heard.     No friction rub. No gallop.   Pulmonary:      Effort: Pulmonary effort is normal. No respiratory distress.      Breath sounds: No stridor. No wheezing, rhonchi or rales.   Abdominal:      General: Abdomen is flat. There is no distension.      Palpations: There is no mass.      Tenderness: There is no abdominal tenderness. There is no left CVA tenderness, guarding or rebound.      Hernia: No hernia is present.   Musculoskeletal:         General: No swelling. Normal range of motion.      Cervical back: No rigidity. No muscular tenderness.      Right lower leg: No edema.      Left lower leg: No edema.   Lymphadenopathy:      Cervical: No cervical adenopathy.   Skin:     General: Skin is warm and dry.      Coloration: Skin is not jaundiced or pale.      Findings: No bruising or erythema.   Neurological:      Mental Status: She is alert.         Laboratory:  Recent Labs     12/17/24  0456   WBC 13.3*   RBC 4.70   HEMOGLOBIN 13.2   HEMATOCRIT 40.2   MCV 85.5   MCH 28.1   MCHC 32.8   RDW 45.3   PLATELETCT 505*   MPV 9.6     Recent Labs     12/17/24  0456   SODIUM 139   POTASSIUM 3.4*   CHLORIDE 105   CO2 19*   GLUCOSE 141*   BUN 9   CREATININE 0.92   CALCIUM 9.2     Recent Labs     12/17/24  0456   ALTSGPT 13   ASTSGOT 14   ALKPHOSPHAT 82   TBILIRUBIN 0.4   LIPASE 14   GLUCOSE 141*         No results for input(s): \"NTPROBNP\" in the last 72 hours.      No results for input(s): \"TROPONINT\" in the last 72 hours.    Imaging:  CT-RENAL COLIC EVALUATION(A/P W/O)   Final Result         1.  7.5 mm proximal right ureteral stone resulting in right urinary tract obstructive " changes.   2.  Small fat-containing bilateral inguinal hernias          no X-Ray or EKG requiring interpretation    Assessment/Plan:  Justification for Admission Status  I anticipate this patient will require at least two midnights for appropriate medical management, necessitating inpatient admission because nephrolithiasis    Patient will need a Med/Surg bed on MEDICAL service .  The need is secondary to nephrolithiasis.    * Nephrolithiasis- (present on admission)  Assessment & Plan  7.5 mm stone on the right proximal ureter  Pain control with oral and IV narcotics and will to monitor mental and respiratory status closely  Toradol   Flomax   IV hydration  UA pending  Urology has been consulted    Hydronephrosis  Assessment & Plan  Right-sided hydronephrosis  Monitor kidney function  IV hydration    Primary hypertension  Assessment & Plan  Uncontrolled  Continue current home medications  IV as needed medications have been ordered      DM (diabetes mellitus) ([facility])  Assessment & Plan  Start on insulin sliding scale with serial Accu-Checks  Hypoglycemic protocol in place          VTE prophylaxis: SCDs/TEDs

## 2024-12-17 NOTE — CONSULTS
Urology Consultation    Date of Service  12/17/2024    Referring Physician  Arturo Su*    Consulting Physician  Becka Ledezma P.A.-C.    Reason for Consultation  5 mm proximal RIGHT ureteric stone    History of Presenting Illness  44 y.o. female with PMH of kidney stones, who presented 12/17/2024 with RIGHT renal colic secondary to a 5 mm proximal RIGHT ureteric stone. She was last seen in the office by myself on 10/14/24, and I recommended updated upper tract imaging to assess stone burden. She had not arranged this imaging yet.     The patient states that RIGHT flank pain started approximately 3 days prior. Pain worsened last night and she had associated nausea and vomiting. She denies any fevers but endorses subjective chills. She denies any hematuria or dysuria.     Currently, pain has migrated to the RUQ. She reports that pain has not been controlled since arriving in the ED. She is currently NPO.    10/14/24 OV:  GLADYS   Jennifer Huffman is a 44 y.o. female with PMH of abnormal uterine bleeding followed by Dr Ash and mixed urinary incontinence followed by Dr Phelps, presenting to establish herself with [facility] secondary to a history of kidney stones.      The patient reports that she has had kidney stones prior. She has never required surgery for kidney stones. Her last kidney stone episode was a couple years ago. She is on topamax for migraines and has been on it for many years. Her last UT imaging was in 2013.     She reports incontinence with coughing and sneezing. She also reports leaking with urgency. She wears a pad daily and at night. She does notice leaking at night. She goes through 2-3 pads per day on average. She was evaluated by Dr Phelps, urogynecologist and was prescribed Myrbetriq 25 mg daily and referred to PFPT. She started the myrbetriq today and has no started PFPT yet.      She reports issues with AUB that started 2 months ago. She reports that her last period  lasted for 42 days. Additionally, she had a ruptured LEFT ovarian hemorrhagic cyst. She is currently menstruating today.     She does have microhematuria on last 3 micro UA, but does report she was bleeding vaginally during these tests.     She denies any history of gross hematuria or dysuria. Denies any family history of  CA. She is a lifelong non-smoker.      Her mother had urinary incontinence and her father has history of kidney stones.       -- vaginal     Review of Systems  Review of Systems   Constitutional:  Positive for chills. Negative for fever.   Gastrointestinal:  Positive for abdominal pain and nausea. Negative for vomiting.   Genitourinary:  Negative for dysuria, flank pain and hematuria.       Past Medical History   has a past medical history of Allergy, Anemia, Anxiety, Depression, Diabetes (HCC), Heart attack (HCC), Hypertension, Kidney stones, and Migraine.    Surgical History   has no past surgical history on file.    Family History  family history includes Cervical Cancer in her mother; Diabetes in her mother; Hypertension in her father and mother; Stroke in her father.    Social History   reports that she has never smoked. She has never used smokeless tobacco. She reports that she does not drink alcohol and does not use drugs.    Medications  Current Facility-Administered Medications   Medication Dose Route Frequency Provider Last Rate Last Admin    NS infusion   Intravenous Continuous Omid Pool M.D. 100 mL/hr at 24 0820 New Bag at 24 0820    acetaminophen (Tylenol) tablet 650 mg  650 mg Oral Q6HRS PRN Omid Pool M.D.        Pharmacy Consult Request ...Pain Management Review 1 Each  1 Each Other PHARMACY TO DOSE Omid Pool M.D.        oxyCODONE immediate-release (Roxicodone) tablet 5 mg  5 mg Oral Q3HRS PRN Omid Pool M.D.        Or    oxyCODONE immediate release (Roxicodone) tablet 10 mg  10 mg Oral Q3HRS PRN Omid Pool M.D.   10 mg at 24 1026    Or     HYDROmorphone (Dilaudid) injection 1 mg  1 mg Intravenous Q3HRS PRN Omid Pool M.D.   1 mg at 12/17/24 0720    ondansetron (Zofran) syringe/vial injection 4 mg  4 mg Intravenous Q4HRS PRN Omid Pool M.D.        ondansetron (Zofran ODT) dispertab 4 mg  4 mg Oral Q4HRS PRN Omid Pool M.D.        promethazine (Phenergan) tablet 12.5-25 mg  12.5-25 mg Oral Q4HRS PRN Omid Pool M.D.        promethazine (Phenergan) suppository 12.5-25 mg  12.5-25 mg Rectal Q4HRS PRN Omdi Pool M.D.        prochlorperazine (Compazine) injection 5-10 mg  5-10 mg Intravenous Q4HRS PRN Omid Pool M.D.        ketorolac (Toradol) 15 MG/ML injection 15 mg  15 mg Intravenous Q6HRS PRN Omid Pool M.D.        albuterol inhaler 1 Puff  1 Puff Inhalation Q4HRS PRN Omid Pool M.D.        amLODIPine (Norvasc) tablet 10 mg  10 mg Oral Q DAY Omid Pool M.D.   10 mg at 12/17/24 0636    buPROPion SR (Wellbutrin-SR) tablet 150 mg  150 mg Oral BID Omid Pool M.D.   150 mg at 12/17/24 0636    carvedilol (Coreg) tablet 25 mg  25 mg Oral BID WITH MEALS Omid Pool M.D.   25 mg at 12/17/24 0741    hydrOXYzine HCl (Atarax) tablet 50 mg  50 mg Oral Q8HRS PRN Omid Pool M.D.        QUEtiapine (SEROquel) tablet 200 mg  200 mg Oral QHS Omid Pool M.D.        mometasone-formoterol (Dulera) 50-5 MCG/ACT inhaler 2 Puff  2 Puff Inhalation BID (RT) Omid Pool M.D.   2 Puff at 12/17/24 0818    insulin lispro (HumaLOG,AdmeLOG) subcutaneous injection  2-9 Units Subcutaneous Q6HRS Omid Pool M.D.        And    dextrose 50% (D50W) injection 25 g  25 g Intravenous Q15 MIN PRN Omid Pool M.D.        tamsulosin (Flomax) capsule 0.4 mg  0.4 mg Oral AFTER BREAKFAST Omid Pool M.D.   0.4 mg at 12/17/24 0819     Current Outpatient Medications   Medication Sig Dispense Refill    QUEtiapine (SEROQUEL) 200 MG Tab Take 200 mg by mouth at bedtime.      meclizine (ANTIVERT) 25 MG Tab Take 1 tablet by mouth 3 times a day as needed for  dizziness. (Patient taking differently: Take 25 mg by mouth 3 times a day as needed for Dizziness.) 42 Tablet 3    ferrous sulfate (FEROSUL) 325 (65 Fe) MG tablet Take 1 Tablet by mouth every day 30 Tablet 5    polyethylene glycol 3350 (MIRALAX) 17 GM/SCOOP Powder Take 1 scoop mixed with 8 ounces of fluid as directed 510 g 3    medroxyPROGESTERone (PROVERA) 10 MG Tab Take 1 pill daily with start of heavy bleeding with period. Can take 1 pill twice a day if still having heavy bleeding. 30 Tablet 1    Tirzepatide 15 MG/0.5ML Solution Auto-injector Inject 15 mg once a week startdate:10- (Patient taking differently: Inject 15 mg under the skin every 7 days.) 2 mL 6    amphetamine-dextroamphetamine (ADDERALL, 30MG,) 30 MG tablet Take 30 mg by mouth 2 times a day.      mirabegron ER (MYRBETRIQ) 25 MG TABLET SR 24 HR Take 1 Tablet by mouth every day for 360 days. 90 Tablet 3    Sennosides (SENNA) 8.6 MG Tab Take 2 tablets by mouth once daily take as needed to prevent constipation associated with iron supplements. StartDate : 07- 60 Tablet 2    topiramate (TOPAMAX) 100 MG Tab Take 1 tablet by mouth twice a day 60 Tablet 2    buPROPion (WELLBUTRIN XL) 300 MG XL tablet Take 1 tablet by mouth once a day 30 Tablet 2    hydrOXYzine pamoate (VISTARIL) 50 MG Cap Take 1 capsule by mouth 2 times a day (Patient taking differently: Take 50 mg by mouth 2 times a day as needed for Itching.) 60 Capsule 2    topiramate (TOPAMAX) 100 MG Tab Take 1 Tablet by mouth 2 times a day 60 Tablet 2    buPROPion (WELLBUTRIN XL) 300 MG XL tablet Take 1 Tablet by mouth every day 30 Tablet 2    hydrOXYzine pamoate (VISTARIL) 50 MG Cap Take 1 Capsule by mouth 2 times a day (Patient taking differently: Take 50 mg by mouth 2 times a day as needed for Itching.) 60 Capsule 2    topiramate (TOPAMAX) 100 MG Tab Take 1 Tablet by mouth 2 times a day 60 Tablet 2    fluticasone-salmeterol (ADVAIR HFA) 115-21 MCG/ACT inhaler Inhale 1 puff by mouth 2  times a day (Patient not taking: Reported on 12/17/2024) 12 g 12    amLODIPine (NORVASC) 10 MG Tab Take 1 Tablet by mouth once a day For high blood pressure. StartDate : 01- 30 Tablet 12    fluticasone-salmeterol (ADVAIR DISKUS) 100-50 MCG/ACT AEROSOL POWDER, BREATH ACTIVATED Inhale 1 puff by mouth twice a day for asthma. StartDate : 01- (Patient not taking: Reported on 12/17/2024) 60 Each 12    carvedilol (COREG) 25 MG Tab Take 1 Tablet by mouth once a day For blood pressure. StartDate : 01- (Patient taking differently: Take 25 mg by mouth every day.) 30 Tablet 12    albuterol (VENTOLIN HFA) 108 (90 Base) MCG/ACT Aero Soln inhalation aerosol Inhale 2 puffs by mouth every 4-6 hours as needed for shortness of breath 8.5 g 6       Allergies  Allergies   Allergen Reactions    Latex Rash    Tramadol Unspecified     Makes her sick         Physical Exam  Temp:  [36.3 °C (97.3 °F)-36.4 °C (97.6 °F)] 36.3 °C (97.3 °F)  Pulse:  [] 90  Resp:  [16-24] 17  BP: (127-197)/() 127/65  SpO2:  [88 %-98 %] 94 %    Physical Exam    Fluids      Laboratory  Recent Labs     12/17/24  0456   WBC 13.3*   RBC 4.70   HEMOGLOBIN 13.2   HEMATOCRIT 40.2   MCV 85.5   MCH 28.1   MCHC 32.8   RDW 45.3   PLATELETCT 505*   MPV 9.6     Recent Labs     12/17/24  0456   SODIUM 139   POTASSIUM 3.4*   CHLORIDE 105   CO2 19*   GLUCOSE 141*   BUN 9   CREATININE 0.92   CALCIUM 9.2                     Imaging  CT-RENAL COLIC EVALUATION(A/P W/O)   Final Result         1.  7.5 mm proximal right ureteral stone resulting in right urinary tract obstructive changes.   2.  Small fat-containing bilateral inguinal hernias          Assessment/Plan  44 y.o. female with PMH of kidney stones, who presented 12/17/2024 with RIGHT renal colic secondary to a 5 mm proximal RIGHT ureteric stone.    -- NPO  -- OR today for a cystoscopy and RIGHT ureteric stent insertion -- this was explained in detail to the patient, including the typical patient  experience  -- Pain control  -- IV abx per protocol  -- Tunde urine culture  -- Admit to hospitalist for observation  -- Urology to follow patient throughout hospital stay    Becka Ledezma PA-C

## 2024-12-17 NOTE — PROGRESS NOTES
Hospital medicine progress note after midnight:     Ms.Gabrielle Froylan Huffman is a 44 y.o. female who presented 12/17/2024 with past medical history of diabetes, hypertension, obesity, nephrolithiasis who presents to the hospital for right-sided flank pain for the past 3 days.      Patient reports the pain is radiating down the right pelvic area.  It is associated with nausea, vomiting and chills.    She denies any dysuria or hematuria.  Patient's had nephrolithiasis in the past but they have passed on their own.     CT scan found a 7.5 mm stone on the right proximal ureter with hydronephrosis and hydroureter.  Perinephric fat stranding. Patient has significant uncontrolled pain while in the ER and had tried multiple doses of morphine, Toradol.  Eventually she was given ketamine.    During this hospitalization, [facility] urology was consulted.  Shawn Ledezma PA-C following patient.  Recommendations for planned stent placement, continue antibiotics, and follow urology as an outpatient.    Plan of care: Continue pain management; continue antibiotics; anticipate for a stent placement with urology; follow urine culture    Disposition: Patient currently patient status as she is anticipated to stay 2-3 midnights for management of a right proximal ureteral stone with hydronephrosis and hydroureter    Problem list:   Right flank pain  DMT2  Hypertension  Obesity  Nephrolithiasis        Please note that this dictation was created using voice recognition software. I have made every reasonable attempt to correct obvious errors, but there may be errors of grammar and possibly content that I did not discover before finalizing the note.    Electronically signed by:  Dr. TRUDY Dallas, DNP, APRN, FNP-C  Hospitalist Services  [facility]  [phone]  [email or web address]  12/17/24                [id number]

## 2024-12-18 ENCOUNTER — PHARMACY VISIT (OUTPATIENT)
Dept: PHARMACY | Facility: MEDICAL CENTER | Age: 44
End: 2024-12-18
Payer: COMMERCIAL

## 2024-12-18 VITALS
WEIGHT: 230 LBS | BODY MASS INDEX: 36.96 KG/M2 | HEIGHT: 66 IN | DIASTOLIC BLOOD PRESSURE: 92 MMHG | HEART RATE: 106 BPM | SYSTOLIC BLOOD PRESSURE: 149 MMHG | OXYGEN SATURATION: 96 % | TEMPERATURE: 97 F | RESPIRATION RATE: 20 BRPM

## 2024-12-18 LAB
ALBUMIN SERPL BCP-MCNC: 3.4 G/DL (ref 3.2–4.9)
ALBUMIN/GLOB SERPL: 1.1 G/DL
ALP SERPL-CCNC: 73 U/L (ref 30–99)
ALT SERPL-CCNC: 10 U/L (ref 2–50)
ANION GAP SERPL CALC-SCNC: 11 MMOL/L (ref 7–16)
AST SERPL-CCNC: 8 U/L (ref 12–45)
BASOPHILS # BLD AUTO: 0.4 % (ref 0–1.8)
BASOPHILS # BLD: 0.03 K/UL (ref 0–0.12)
BILIRUB SERPL-MCNC: 0.2 MG/DL (ref 0.1–1.5)
BUN SERPL-MCNC: 12 MG/DL (ref 8–22)
CALCIUM ALBUM COR SERPL-MCNC: 9.4 MG/DL (ref 8.5–10.5)
CALCIUM SERPL-MCNC: 8.9 MG/DL (ref 8.5–10.5)
CHLORIDE SERPL-SCNC: 108 MMOL/L (ref 96–112)
CO2 SERPL-SCNC: 22 MMOL/L (ref 20–33)
CREAT SERPL-MCNC: 0.67 MG/DL (ref 0.5–1.4)
EOSINOPHIL # BLD AUTO: 0.01 K/UL (ref 0–0.51)
EOSINOPHIL NFR BLD: 0.1 % (ref 0–6.9)
ERYTHROCYTE [DISTWIDTH] IN BLOOD BY AUTOMATED COUNT: 48.9 FL (ref 35.9–50)
GFR SERPLBLD CREATININE-BSD FMLA CKD-EPI: 109 ML/MIN/1.73 M 2
GLOBULIN SER CALC-MCNC: 3.1 G/DL (ref 1.9–3.5)
GLUCOSE BLD STRIP.AUTO-MCNC: 110 MG/DL (ref 65–99)
GLUCOSE SERPL-MCNC: 106 MG/DL (ref 65–99)
HCT VFR BLD AUTO: 38.3 % (ref 37–47)
HGB BLD-MCNC: 12 G/DL (ref 12–16)
IMM GRANULOCYTES # BLD AUTO: 0.03 K/UL (ref 0–0.11)
IMM GRANULOCYTES NFR BLD AUTO: 0.4 % (ref 0–0.9)
LYMPHOCYTES # BLD AUTO: 1.14 K/UL (ref 1–4.8)
LYMPHOCYTES NFR BLD: 14.1 % (ref 22–41)
MCH RBC QN AUTO: 28.4 PG (ref 27–33)
MCHC RBC AUTO-ENTMCNC: 31.3 G/DL (ref 32.2–35.5)
MCV RBC AUTO: 90.5 FL (ref 81.4–97.8)
MONOCYTES # BLD AUTO: 0.54 K/UL (ref 0–0.85)
MONOCYTES NFR BLD AUTO: 6.7 % (ref 0–13.4)
NEUTROPHILS # BLD AUTO: 6.35 K/UL (ref 1.82–7.42)
NEUTROPHILS NFR BLD: 78.3 % (ref 44–72)
NRBC # BLD AUTO: 0 K/UL
NRBC BLD-RTO: 0 /100 WBC (ref 0–0.2)
PLATELET # BLD AUTO: 407 K/UL (ref 164–446)
PMV BLD AUTO: 9.8 FL (ref 9–12.9)
POTASSIUM SERPL-SCNC: 4.1 MMOL/L (ref 3.6–5.5)
PROT SERPL-MCNC: 6.5 G/DL (ref 6–8.2)
RBC # BLD AUTO: 4.23 M/UL (ref 4.2–5.4)
SODIUM SERPL-SCNC: 141 MMOL/L (ref 135–145)
WBC # BLD AUTO: 8.1 K/UL (ref 4.8–10.8)

## 2024-12-18 PROCEDURE — 700102 HCHG RX REV CODE 250 W/ 637 OVERRIDE(OP): Performed by: STUDENT IN AN ORGANIZED HEALTH CARE EDUCATION/TRAINING PROGRAM

## 2024-12-18 PROCEDURE — 82962 GLUCOSE BLOOD TEST: CPT

## 2024-12-18 PROCEDURE — 700111 HCHG RX REV CODE 636 W/ 250 OVERRIDE (IP): Performed by: STUDENT IN AN ORGANIZED HEALTH CARE EDUCATION/TRAINING PROGRAM

## 2024-12-18 PROCEDURE — RXMED WILLOW AMBULATORY MEDICATION CHARGE: Performed by: STUDENT IN AN ORGANIZED HEALTH CARE EDUCATION/TRAINING PROGRAM

## 2024-12-18 PROCEDURE — 85025 COMPLETE CBC W/AUTO DIFF WBC: CPT

## 2024-12-18 PROCEDURE — 80053 COMPREHEN METABOLIC PANEL: CPT

## 2024-12-18 PROCEDURE — 700105 HCHG RX REV CODE 258: Performed by: STUDENT IN AN ORGANIZED HEALTH CARE EDUCATION/TRAINING PROGRAM

## 2024-12-18 PROCEDURE — 700102 HCHG RX REV CODE 250 W/ 637 OVERRIDE(OP): Performed by: HOSPITALIST

## 2024-12-18 PROCEDURE — 99239 HOSP IP/OBS DSCHRG MGMT >30: CPT | Performed by: STUDENT IN AN ORGANIZED HEALTH CARE EDUCATION/TRAINING PROGRAM

## 2024-12-18 PROCEDURE — A9270 NON-COVERED ITEM OR SERVICE: HCPCS | Performed by: HOSPITALIST

## 2024-12-18 PROCEDURE — A9270 NON-COVERED ITEM OR SERVICE: HCPCS | Performed by: STUDENT IN AN ORGANIZED HEALTH CARE EDUCATION/TRAINING PROGRAM

## 2024-12-18 PROCEDURE — 700111 HCHG RX REV CODE 636 W/ 250 OVERRIDE (IP): Mod: JZ | Performed by: HOSPITALIST

## 2024-12-18 RX ORDER — PHENAZOPYRIDINE HYDROCHLORIDE 200 MG/1
200 TABLET, FILM COATED ORAL 3 TIMES DAILY PRN
Qty: 6 TABLET | Refills: 0 | Status: SHIPPED | OUTPATIENT
Start: 2024-12-18 | End: 2024-12-21

## 2024-12-18 RX ORDER — CEPHALEXIN 500 MG/1
1000 CAPSULE ORAL 2 TIMES DAILY
Qty: 56 CAPSULE | Refills: 0 | Status: ACTIVE | OUTPATIENT
Start: 2024-12-18 | End: 2025-01-02

## 2024-12-18 RX ORDER — TAMSULOSIN HYDROCHLORIDE 0.4 MG/1
0.4 CAPSULE ORAL
Qty: 30 CAPSULE | Refills: 0 | Status: SHIPPED | OUTPATIENT
Start: 2024-12-19

## 2024-12-18 RX ORDER — OXYCODONE HYDROCHLORIDE 5 MG/1
5 TABLET ORAL EVERY 6 HOURS PRN
Qty: 10 TABLET | Refills: 0 | Status: SHIPPED | OUTPATIENT
Start: 2024-12-18 | End: 2024-12-22

## 2024-12-18 RX ADMIN — OXYCODONE HYDROCHLORIDE 10 MG: 10 TABLET ORAL at 11:43

## 2024-12-18 RX ADMIN — KETOROLAC TROMETHAMINE 15 MG: 15 INJECTION, SOLUTION INTRAMUSCULAR; INTRAVENOUS at 02:53

## 2024-12-18 RX ADMIN — CEFAZOLIN 2 G: 2 INJECTION, POWDER, FOR SOLUTION INTRAMUSCULAR; INTRAVENOUS at 09:13

## 2024-12-18 RX ADMIN — KETOROLAC TROMETHAMINE 15 MG: 15 INJECTION, SOLUTION INTRAMUSCULAR; INTRAVENOUS at 09:23

## 2024-12-18 RX ADMIN — TAMSULOSIN HYDROCHLORIDE 0.4 MG: 0.4 CAPSULE ORAL at 09:12

## 2024-12-18 RX ADMIN — CARVEDILOL 25 MG: 25 TABLET, FILM COATED ORAL at 09:13

## 2024-12-18 RX ADMIN — PHENAZOPYRIDINE 100 MG: 100 TABLET ORAL at 09:12

## 2024-12-18 RX ADMIN — AMLODIPINE BESYLATE 10 MG: 10 TABLET ORAL at 05:21

## 2024-12-18 RX ADMIN — BUPROPION HYDROCHLORIDE 150 MG: 150 TABLET, FILM COATED, EXTENDED RELEASE ORAL at 05:21

## 2024-12-18 RX ADMIN — ALBUTEROL SULFATE 1 PUFF: 90 AEROSOL, METERED RESPIRATORY (INHALATION) at 09:23

## 2024-12-18 RX ADMIN — OXYCODONE HYDROCHLORIDE 10 MG: 10 TABLET ORAL at 05:20

## 2024-12-18 ASSESSMENT — ENCOUNTER SYMPTOMS
FLANK PAIN: 0
ABDOMINAL PAIN: 1
FEVER: 0
VOMITING: 0
NAUSEA: 0
CHILLS: 0

## 2024-12-18 ASSESSMENT — LIFESTYLE VARIABLES
TOTAL SCORE: 0
AVERAGE NUMBER OF DAYS PER WEEK YOU HAVE A DRINK CONTAINING ALCOHOL: 0
DOES PATIENT WANT TO STOP DRINKING: NO
HOW MANY TIMES IN THE PAST YEAR HAVE YOU HAD 5 OR MORE DRINKS IN A DAY: 0
ALCOHOL_USE: NO
EVER HAD A DRINK FIRST THING IN THE MORNING TO STEADY YOUR NERVES TO GET RID OF A HANGOVER: NO
TOTAL SCORE: 0
HAVE PEOPLE ANNOYED YOU BY CRITICIZING YOUR DRINKING: NO
ON A TYPICAL DAY WHEN YOU DRINK ALCOHOL HOW MANY DRINKS DO YOU HAVE: 0
CONSUMPTION TOTAL: NEGATIVE
HAVE YOU EVER FELT YOU SHOULD CUT DOWN ON YOUR DRINKING: NO
EVER FELT BAD OR GUILTY ABOUT YOUR DRINKING: NO
TOTAL SCORE: 0

## 2024-12-18 ASSESSMENT — PATIENT HEALTH QUESTIONNAIRE - PHQ9
1. LITTLE INTEREST OR PLEASURE IN DOING THINGS: NOT AT ALL
SUM OF ALL RESPONSES TO PHQ9 QUESTIONS 1 AND 2: 0
2. FEELING DOWN, DEPRESSED, IRRITABLE, OR HOPELESS: NOT AT ALL

## 2024-12-18 ASSESSMENT — FIBROSIS 4 INDEX: FIB4 SCORE: 0.43

## 2024-12-18 ASSESSMENT — PAIN DESCRIPTION - PAIN TYPE
TYPE: ACUTE PAIN

## 2024-12-18 NOTE — DISCHARGE PLANNING
Care Transition Team Assessment    Primary emergency Contact is pt's Mother Ning at [phone]     LMSW met with pt at bedside to complete discharge assessment and chart review was completed to obtain the information used in this assessment. Pt is A/Ox4 and agreeable to assessment. Pt verified information on face sheet.      - Prior to admission, pt lived with at [address] [place] [address]  - Per pt, family is good support for discharge back into community. Family is at bedside for a discharge ride.  - Pt stated to be independent with ADL/IADLs and needs assistance with transportation from public or friends and family  - Per pt, no DME or O2 was owned/used prior to admission   - Pt is disabled and insured through Medicare and Medicaid FFS    - Preferred pharmacy is [facility] when admitted  -PCP is RADHA BAXTER  -Patient does not report any history of REGAN or MH    No other case management needs at this time.     Information Source  Orientation Level: Oriented X4  Information Given By: Patient  Who is responsible for making decisions for patient? : Patient    Readmission Evaluation  Is this a readmission?: No    Elopement Risk  Legal Hold: No  Ambulatory or Self Mobile in Wheelchair: Yes  Disoriented: No  Elopement Risk: Not at Risk for Elopement    Interdisciplinary Discharge Planning  Lives with - Patient's Self Care Capacity: (Patient-Rptd) (P) Sibling  Patient or legal guardian wants to designate a caregiver: No  Support Systems: (Patient-Rptd) (P) Family Member(s)  Housing / Facility: (Patient-Rptd) (P) [address] [facility]  Patient Prefers to be Discharged to:: (Patient-Rptd) (P) Home  Durable Medical Equipment: (Patient-Rptd) (P) Not Applicable    Discharge Preparedness  What is your plan after discharge?: Home with help  What are your discharge supports?: Parent, Partner, Sibling  Prior Functional Level: Independent with Activities of Daily Living, Independent with Medication  Management, Ambulatory  Difficulity with ADLs: None  Difficulity with IADLs: None    Functional Assesment  Prior Functional Level: Independent with Activities of Daily Living, Independent with Medication Management, Ambulatory    Finances  Financial Barriers to Discharge: No  Prescription Coverage: Yes              Advance Directive  Advance Directive?: None    Domestic Abuse  Have you ever been the victim of abuse or violence?: No  Possible Abuse/Neglect Reported to:: Not Applicable    Psychological Assessment  History of Substance Abuse: None  History of Psychiatric Problems: No  Non-compliant with Treatment: No  Newly Diagnosed Illness: No    Discharge Risks or Barriers  Discharge risks or barriers?: Transportation, Substance abuse, Mental health, Complex medical needs  Patient risk factors: Cognitive / sensory / physical deficit, Complex medical needs    Anticipated Discharge Information  Discharge Disposition: Discharged to home/self care (01)

## 2024-12-18 NOTE — DISCHARGE SUMMARY
Discharge Summary    CHIEF COMPLAINT ON ADMISSION  Chief Complaint   Patient presents with    Flank Pain    N/V     BIB EMS from home, reports progressing right side flank pain for 3 days with nausea and vomiting. Denies fever. Patient states unable to keep anything down. Has the same pain when she had kidney stones in the past. -dysuria -diarrhea       Reason for Admission  ems     Admission Date  12/17/2024    CODE STATUS  Full Code    HPI & HOSPITAL COURSE  This is a 44 y.o. female who presented 12/17/2024 with past medical history of diabetes, hypertension, obesity, nephrolithiasis who presents to the hospital for right-sided flank pain for the past 3 days.       CT scan found a 7.5 mm stone on the right proximal ureter with hydronephrosis and hydroureter.  Perinephric fat stranding. Patient has significant uncontrolled pain while in the ER and had tried multiple doses of morphine, Toradol.  Eventually she was given ketamine.    Urology was consulted and she underwent cystoscopy, right  [id number] 12/17. Urine culture still pending. Urology recommends Flomax and empirical antibiotics for 2 weeks and elective R URS for stone removal.  Patient is hemodynamically stable.  Right flank pain has been improving significantly.     Of note, patient has history of hypertension and is on Coreg 25 mg once a day.  I have discussed with patient and advised patient to discuss with PCP as Coreg should be twice daily dosing.  Patient voiced understanding.    Therefore, she is discharged in good and stable condition to home with close outpatient follow-up.    The patient recovered much more quickly than anticipated on admission.    Discharge Date  12/18/24    FOLLOW UP ITEMS POST DISCHARGE  PCP  urology    DISCHARGE DIAGNOSES  Principal Problem:    Nephrolithiasis (POA: Yes)  Active Problems:    DM (diabetes mellitus) (HCC) (POA: Unknown)    Primary hypertension (POA: Unknown)    Hydronephrosis (POA: Unknown)  Resolved Problems:     * No resolved hospital problems. *      FOLLOW UP  No future appointments.  RADHA Pineda  [address] [id number] [phone]    Follow up in 1 week(s)        MEDICATIONS ON DISCHARGE     Medication List        START taking these medications        Instructions   cephALEXin 500 MG Caps  Commonly known as: Keflex   Take 2 Capsules by mouth 2 times a day for 14 days.  Dose: 1,000 mg     phenazopyridine 200 MG Tabs  Commonly known as: Pyridium   Take 1 Tablet by mouth 3 times a day as needed (irritation) for up to 2 days.  Dose: 200 mg     tamsulosin 0.4 MG capsule  Start taking on: December 19, 2024  Commonly known as: Flomax   Take 1 Capsule by mouth 1/2 hour after breakfast.  Dose: 0.4 mg            CHANGE how you take these medications        Instructions   buPROPion 300 MG XL tablet  What changed: Another medication with the same name was removed. Continue taking this medication, and follow the directions you see here.  Commonly known as: Wellbutrin XL   Take 1 Tablet by mouth every day     topiramate 100 MG Tabs  What changed: Another medication with the same name was removed. Continue taking this medication, and follow the directions you see here.  Commonly known as: Topamax   Take 1 Tablet by mouth 2 times a day            CONTINUE taking these medications        Instructions   ADDERALL (30MG) 30 MG tablet  Generic drug: amphetamine-dextroamphetamine   Take 30 mg by mouth 2 times a day.  Dose: 30 mg     albuterol 108 (90 Base) MCG/ACT Aers inhalation aerosol  Commonly known as: Ventolin HFA   Inhale 2 puffs by mouth every 4-6 hours as needed for shortness of breath     amLODIPine 10 MG Tabs  Commonly known as: Norvasc   Take 1 Tablet by mouth once a day For high blood pressure. StartDate : 01-  (Take 1 Tablet by mouth once a day For high blood pressure. StartDate : 01-)     carvedilol 25 MG Tabs  Commonly known as: Coreg   Take 1 Tablet by mouth once a day For  blood pressure. StartDate : 01-     FeroSul 325 (65 Fe) MG tablet  Generic drug: ferrous sulfate   Take 1 Tablet by mouth every day     hydrOXYzine pamoate 50 MG Caps  Commonly known as: Vistaril   Take 1 capsule by mouth 2 times a day     meclizine 25 MG Tabs  Commonly known as: Antivert   Take 1 tablet by mouth 3 times a day as needed for dizziness.     medroxyPROGESTERone 10 MG Tabs  Commonly known as: Provera   Take 1 pill daily with start of heavy bleeding with period. Can take 1 pill twice a day if still having heavy bleeding.  Dose: 10 mg     Mounjaro 15 MG/0.5ML Soaj  Generic drug: Tirzepatide   Inject 15 mg once a week startdate:10-     Myrbetriq 25 MG Tb24  Generic drug: mirabegron ER   Take 1 Tablet by mouth every day for 360 days.  (Take 1 Tablet by mouth every day for 360 days.)  Dose: 25 mg     polyethylene glycol 3350 17 GM/SCOOP Powd  Commonly known as: Miralax   Take 1 scoop mixed with 8 ounces of fluid as directed     QUEtiapine 200 MG Tabs  Commonly known as: SEROquel   Take 200 mg by mouth at bedtime.  Dose: 200 mg     Senna 8.6 MG Tabs   Take 2 tablets by mouth once daily take as needed to prevent constipation associated with iron supplements. StartDate : 07-            STOP taking these medications      Advair Diskus 100-50 MCG/ACT Aepb  Generic drug: fluticasone-salmeterol            ASK your doctor about these medications        Instructions   Advair -21 MCG/ACT inhaler  Generic drug: fluticasone-salmeterol   Doctor's comments: Updated dose since insurance did not cover previous dosage. StartDate : 01-  Inhale 1 puff by mouth 2 times a day              Allergies  Allergies   Allergen Reactions    Latex Rash    Tramadol Unspecified     Makes her sick         DIET  Orders Placed This Encounter   Procedures    Diet Order Diet: Regular     Standing Status:   Standing     Number of Occurrences:   1     Order Specific Question:   Diet:     Answer:   Regular [1]        ACTIVITY  As tolerated.  Weight bearing as tolerated    CONSULTATIONS  Urology      PROCEDURES  S/p ystoscopy, right  [id number] 12/17    LABORATORY  Lab Results   Component Value Date    SODIUM 141 12/18/2024    POTASSIUM 4.1 12/18/2024    CHLORIDE 108 12/18/2024    CO2 22 12/18/2024    GLUCOSE 106 (H) 12/18/2024    BUN 12 12/18/2024    CREATININE 0.67 12/18/2024    CREATININE 0.9 04/29/2009        Lab Results   Component Value Date    WBC 8.1 12/18/2024    HEMOGLOBIN 12.0 12/18/2024    HEMATOCRIT 38.3 12/18/2024    PLATELETCT 407 12/18/2024      DX-PORTABLE FLUOROSCOPY < 1 HOUR Reason For Exam: SDS C ARM   Final Result      Intraoperative fluoroscopy spot images as described above.      CT-RENAL COLIC EVALUATION(A/P W/O)   Final Result         1.  7.5 mm proximal right ureteral stone resulting in right urinary tract obstructive changes.   2.  Small fat-containing bilateral inguinal hernias          Total time of the discharge process 35 minutes.

## 2024-12-18 NOTE — PROGRESS NOTES
4 Eyes Skin Assessment Completed by Birdie RN and Tosha RN.    Head WDL  Ears Redness and Blanching both ears, R ear has a spot that is red  Nose WDL  Mouth WDL  Neck WDL  Breast/Chest WDL  Shoulder Blades WDL  Spine WDL  (R) Arm/Elbow/Hand Redness and Blanching  (L) Arm/Elbow/Hand Redness and Blanching  Abdomen Redness  Groin PRABHJOT thoroughly pt. Refused to open legs  Scrotum/Coccyx/Buttocks no noted redness but unable to thoroughly the gluteal cleft pt. Refused,   (R) Leg WDL  (L) Leg WDL  (R) Heel/Foot/Toe Blanching  (L) Heel/Foot/Toe Blanching          Devices In Places PIV, BP cuff , face mask, pt. Refuses to take off for now      Interventions In Place Pillows, Heels Loaded W/Pillows, and Pressure Redistribution Mattress    Possible Skin Injury No    Pictures Uploaded Into Epic Yes  Wound Consult Placed N/A  RN Wound Prevention Protocol Ordered Yes Upon admission to the floor patient was informed how we do 2 RN Skin checks to make sure there are no skin issues. Pt gave this RN consent to perform a skin check.        Left ear     Left ear     Right ear     Right ear

## 2024-12-18 NOTE — PROGRESS NOTES
[facility]  Discussed discharge paperwork and medications with patient. Answered all questions. No home meds to return, patient returning later today to  meds to beds. Patient escorted out, personal belongings in hand.

## 2024-12-18 NOTE — DISCHARGE INSTRUCTIONS
Discharge Instructions per Hong Chapman M.D.    Please follow-up with PCP as outpatient.  Follow up with urology as outpatient  Continue taking antibiotics Keflex as instructed  Take flomax once a day. Keep hydration  Take Pyridium as needed for irritation. This med can cause orange urine.     Return to ER in the event of new or worsening symptoms. Please note importance of compliance and the patient has agreed to proceed with all medical recommendations and follow up plan indicated above. All medications come with benefits and risks. Risks may include permanent injury or death and these risks can be minimized with close reassessment and monitoring. Please make it to your scheduled follow ups with PCP and urology Dr. Servin

## 2024-12-18 NOTE — CARE PLAN
The patient is Watcher - Medium risk of patient condition declining or worsening         Progress made toward(s) clinical / shift goals:      Problem: Pain - Standard  Goal: Alleviation of pain or a reduction in pain to the patient’s comfort goal  Outcome: Progressing     Problem: Knowledge Deficit - Standard  Goal: Patient and family/care givers will demonstrate understanding of plan of care, disease process/condition, diagnostic tests and medications  Outcome: Progressing       Patient is not progressing towards the following goals:

## 2024-12-18 NOTE — PROGRESS NOTES
Surgical Progress Note    Author: Becka Ledezma P.A.-C. Date & Time created: 2024   10:01 AM     Interval Events:  44 y.o. female with PMH of kidney stones, who presented 2024 with RIGHT renal colic secondary to a 5 mm proximal RIGHT ureteric stone, now s/p cystoscopy and RIGHT ureteric stent placement with Dr Servin on 24.    The patient reports that pain is much improved and she is feeling well. She does report a sore throat which we discussed is expected after a surgical procedure.    She does continue to have some hematuria.     Review of Systems   Constitutional:  Negative for chills and fever.   Gastrointestinal:  Positive for abdominal pain. Negative for nausea and vomiting.   Genitourinary:  Positive for hematuria. Negative for dysuria and flank pain.     Hemodynamics:  Temp (24hrs), Av.2 °C (97.2 °F), Min:35.8 °C (96.4 °F), Max:37 °C (98.6 °F)  Temperature: 36.1 °C (97 °F)  Pulse  Av.4  Min: 84  Max: 106   Blood Pressure: (!) 149/92     Respiratory:    Respiration: 18, Pulse Oximetry: 96 %           Neuro:  GCS       Fluids:    Intake/Output Summary (Last 24 hours) at 2024 1001  Last data filed at 2024 1722  Gross per 24 hour   Intake 1400 ml   Output --   Net 1400 ml     Weight: 104 kg (230 lb)  Current Diet Order   Procedures    Diet Order Diet: Regular     Physical Exam  Constitutional:       General: She is not in acute distress.  Abdominal:      Comments: Softly distended, tender to palpation in RUQ, otherwise nontender, no CVAT bilaterally   Neurological:      Mental Status: She is alert.       Labs:  Recent Results (from the past 24 hours)   POCT glucose device results    Collection Time: 24 10:28 AM   Result Value Ref Range    POC Glucose, Blood 95 65 - 99 mg/dL   POCT glucose device results    Collection Time: 24 11:44 AM   Result Value Ref Range    POC Glucose, Blood 114 (H) 65 - 99 mg/dL   POCT glucose device results    Collection Time:  12/17/24  6:09 PM   Result Value Ref Range    POC Glucose, Blood 129 (H) 65 - 99 mg/dL   POCT glucose device results    Collection Time: 12/17/24 11:35 PM   Result Value Ref Range    POC Glucose, Blood 117 (H) 65 - 99 mg/dL   POCT glucose device results    Collection Time: 12/18/24  5:17 AM   Result Value Ref Range    POC Glucose, Blood 110 (H) 65 - 99 mg/dL   CBC with Differential    Collection Time: 12/18/24  5:35 AM   Result Value Ref Range    WBC 8.1 4.8 - 10.8 K/uL    RBC 4.23 4.20 - 5.40 M/uL    Hemoglobin 12.0 12.0 - 16.0 g/dL    Hematocrit 38.3 37.0 - 47.0 %    MCV 90.5 81.4 - 97.8 fL    MCH 28.4 27.0 - 33.0 pg    MCHC 31.3 (L) 32.2 - 35.5 g/dL    RDW 48.9 35.9 - 50.0 fL    Platelet Count 407 164 - 446 K/uL    MPV 9.8 9.0 - 12.9 fL    Neutrophils-Polys 78.30 (H) 44.00 - 72.00 %    Lymphocytes 14.10 (L) 22.00 - 41.00 %    Monocytes 6.70 0.00 - 13.40 %    Eosinophils 0.10 0.00 - 6.90 %    Basophils 0.40 0.00 - 1.80 %    Immature Granulocytes 0.40 0.00 - 0.90 %    Nucleated RBC 0.00 0.00 - 0.20 /100 WBC    Neutrophils (Absolute) 6.35 1.82 - 7.42 K/uL    Lymphs (Absolute) 1.14 1.00 - 4.80 K/uL    Monos (Absolute) 0.54 0.00 - 0.85 K/uL    Eos (Absolute) 0.01 0.00 - 0.51 K/uL    Baso (Absolute) 0.03 0.00 - 0.12 K/uL    Immature Granulocytes (abs) 0.03 0.00 - 0.11 K/uL    NRBC (Absolute) 0.00 K/uL   Comp Metabolic Panel (CMP)    Collection Time: 12/18/24  5:35 AM   Result Value Ref Range    Sodium 141 135 - 145 mmol/L    Potassium 4.1 3.6 - 5.5 mmol/L    Chloride 108 96 - 112 mmol/L    Co2 22 20 - 33 mmol/L    Anion Gap 11.0 7.0 - 16.0    Glucose 106 (H) 65 - 99 mg/dL    Bun 12 8 - 22 mg/dL    Creatinine 0.67 0.50 - 1.40 mg/dL    Calcium 8.9 8.5 - 10.5 mg/dL    Correct Calcium 9.4 8.5 - 10.5 mg/dL    AST(SGOT) 8 (L) 12 - 45 U/L    ALT(SGPT) 10 2 - 50 U/L    Alkaline Phosphatase 73 30 - 99 U/L    Total Bilirubin 0.2 0.1 - 1.5 mg/dL    Albumin 3.4 3.2 - 4.9 g/dL    Total Protein 6.5 6.0 - 8.2 g/dL    Globulin 3.1 1.9  - 3.5 g/dL    A-G Ratio 1.1 g/dL   ESTIMATED GFR    Collection Time: 12/18/24  5:35 AM   Result Value Ref Range    GFR (CKD-EPI) 109 >60 mL/min/1.73 m 2     Medical Decision Making, by Problem:  Active Hospital Problems    Diagnosis     Nephrolithiasis [N20.0]     DM (diabetes mellitus) (HCC) [E11.9]     Primary hypertension [I10]     Hydronephrosis [N13.30]      Plan:  44 y.o. female with PMH of kidney stones, who presented 12/17/2024 with RIGHT renal colic secondary to a 5 mm proximal RIGHT ureteric stone, now s/p cystoscopy and RIGHT ureteric stent placement with Dr Servin POD #1.    -- AVSS  -- Hemodynamically stable  -- D/C home with tamsulosin 0.4 mg daily x 1 month  -- D/C with empiric abx x2 weeks  -- Urology to schedule elective RIGHT URS for stone removal    Becka Ledezma PA-C

## 2024-12-19 ENCOUNTER — PHARMACY VISIT (OUTPATIENT)
Dept: PHARMACY | Facility: MEDICAL CENTER | Age: 44
End: 2024-12-19
Payer: COMMERCIAL

## 2024-12-20 LAB
BACTERIA UR CULT: NORMAL
SIGNIFICANT IND 70042: NORMAL
SITE SITE: NORMAL
SOURCE SOURCE: NORMAL

## 2024-12-23 ENCOUNTER — TELEPHONE (OUTPATIENT)
Dept: SURGERY | Facility: MEDICAL CENTER | Age: 44
End: 2024-12-23
Payer: MEDICARE

## 2024-12-23 ENCOUNTER — APPOINTMENT (OUTPATIENT)
Dept: ADMISSIONS | Facility: MEDICAL CENTER | Age: 44
End: 2024-12-23
Attending: STUDENT IN AN ORGANIZED HEALTH CARE EDUCATION/TRAINING PROGRAM
Payer: MEDICARE

## 2024-12-23 NOTE — TELEPHONE ENCOUNTER
Rescheduled for sooner to 12/31 ok with Malathi and confirmed with pt date, new check In time,  new location TT

## 2024-12-26 ENCOUNTER — PRE-ADMISSION TESTING (OUTPATIENT)
Dept: ADMISSIONS | Facility: MEDICAL CENTER | Age: 44
End: 2024-12-26
Attending: STUDENT IN AN ORGANIZED HEALTH CARE EDUCATION/TRAINING PROGRAM
Payer: MEDICARE

## 2024-12-26 PROCEDURE — RXMED WILLOW AMBULATORY MEDICATION CHARGE

## 2024-12-26 NOTE — OR NURSING
Patient stated during her pre-admit telephone appointment today that she continues to be in pain from her kidney stone and has noticed some decreased ability to urinate. This nurse instructed her to notify her surgeon MD Servin of this now and also encouraged her to go to the ER, patient verbalized understanding. This patient is DX with nephrolithiasis and is scheduled for surgery on 12/31/2024 with MD Servin for this.

## 2024-12-26 NOTE — PREADMIT AVS NOTE
Current Medications   Medication Instructions    tamsulosin (FLOMAX) 0.4 MG capsule CONTINUE TAKING MEDICATION AS PRESCRIBED PRIOR TO SURGERY AND TAKE AM OF SURGERY.     cephALEXin (KEFLEX) 500 MG Cap FOLLOW INSTRUCTIONS FROM SURGEON OR SPECIALIST    meclizine (ANTIVERT) 25 MG Tab CONTINUE TAKING MEDICATION AS PRESCRIBED.     ferrous sulfate (FEROSUL) 325 (65 Fe) MG tablet FOLLOW INSTRUCTIONS FROM SURGEON OR SPECIALIST    polyethylene glycol 3350 (MIRALAX) 17 GM/SCOOP Powder Patient states currently not taking.     medroxyPROGESTERone (PROVERA) 10 MG Tab HOLD AM OF SURGERY.    Tirzepatide 15 MG/0.5ML Solution Auto-injector HOLD 7 DAYS PRIOR TO SURGERY/PROCEDURE.     amphetamine-dextroamphetamine (ADDERALL, 30MG,) 30 MG tablet CONTINUE TAKING MEDICATION AS PRESCRIBED PRIOR TO SURGERY AND TAKE AM OF SURGERY.     mirabegron ER (MYRBETRIQ) 25 MG TABLET SR 24 HR CONTINUE TAKING MEDICATION AS PRESCRIBED.     QUEtiapine (SEROQUEL) 200 MG Tab CONTINUE TAKING MEDICATION AS PRESCRIBED.     Sennosides (SENNA) 8.6 MG Tab FOLLOW INSTRUCTIONS FROM SURGEON OR SPECIALIST    hydrOXYzine pamoate (VISTARIL) 50 MG Cap CONTINUE TAKING MEDICATION AS PRESCRIBED.     buPROPion (WELLBUTRIN XL) 300 MG XL tablet CONTINUE TAKING MEDICATION AS PRESCRIBED.     topiramate (TOPAMAX) 100 MG Tab CONTINUE TAKING MEDICATION AS PRESCRIBED PRIOR TO SURGERY AND TAKE AM OF SURGERY.     fluticasone-salmeterol (ADVAIR HFA) 115-21 MCG/ACT inhaler Patient states currently not taking.     amLODIPine (NORVASC) 10 MG Tab CONTINUE TAKING MEDICATION AS PRESCRIBED.     carvedilol (COREG) 25 MG Tab CONTINUE TAKING MEDICATION AS PRESCRIBED.     albuterol (VENTOLIN HFA) 108 (90 Base) MCG/ACT Aero Soln inhalation aerosol MAY TAKE DAY OF SURGERY, IF NEEDED.

## 2024-12-27 ENCOUNTER — TELEPHONE (OUTPATIENT)
Dept: UROLOGY | Facility: MEDICAL CENTER | Age: 44
End: 2024-12-27
Payer: MEDICARE

## 2024-12-30 ENCOUNTER — PRE-ADMISSION TESTING (OUTPATIENT)
Dept: ADMISSIONS | Facility: MEDICAL CENTER | Age: 44
End: 2024-12-30
Attending: STUDENT IN AN ORGANIZED HEALTH CARE EDUCATION/TRAINING PROGRAM
Payer: MEDICARE

## 2024-12-30 ENCOUNTER — TELEPHONE (OUTPATIENT)
Dept: UROLOGY | Facility: MEDICAL CENTER | Age: 44
End: 2024-12-30
Payer: MEDICARE

## 2024-12-30 ENCOUNTER — PHARMACY VISIT (OUTPATIENT)
Dept: PHARMACY | Facility: MEDICAL CENTER | Age: 44
End: 2024-12-30
Payer: COMMERCIAL

## 2024-12-30 DIAGNOSIS — Z01.812 PRE-OPERATIVE LABORATORY EXAMINATION: ICD-10-CM

## 2024-12-30 DIAGNOSIS — Z01.810 PRE-OPERATIVE CARDIOVASCULAR EXAMINATION: ICD-10-CM

## 2024-12-30 LAB
EKG IMPRESSION: NORMAL
EST. AVERAGE GLUCOSE BLD GHB EST-MCNC: 114 MG/DL
HBA1C MFR BLD: 5.6 % (ref 4–5.6)
HCG SERPL QL: NEGATIVE

## 2024-12-30 PROCEDURE — 84703 CHORIONIC GONADOTROPIN ASSAY: CPT

## 2024-12-30 PROCEDURE — 83036 HEMOGLOBIN GLYCOSYLATED A1C: CPT

## 2024-12-30 PROCEDURE — 93005 ELECTROCARDIOGRAM TRACING: CPT | Mod: TC

## 2024-12-30 PROCEDURE — 36415 COLL VENOUS BLD VENIPUNCTURE: CPT

## 2024-12-30 PROCEDURE — 93010 ELECTROCARDIOGRAM REPORT: CPT | Performed by: INTERNAL MEDICINE

## 2024-12-30 NOTE — OR NURSING
Patient ambulated to appointment with her sister for her testing appointment for surgery scheduled with Dr. Servin tomorrow.  Patient states that she is having increased pain d/t kidney stone, dizziness and trouble urinating, which has been ongoing.  She states that she left 2 messages for Dr. Servin's office but has not heard back.  Patient states that she is okay to complete preop testing.  Patient states she does not want to go to the ER.   Jessee Morales, surgery scheduler, at Dr. Servin's office with the above information.  Carmen states that she will update MA/MD and will have someone from the office reach out to patient.  Patient and her sister updated.

## 2024-12-31 ENCOUNTER — PHARMACY VISIT (OUTPATIENT)
Dept: PHARMACY | Facility: MEDICAL CENTER | Age: 44
End: 2024-12-31
Payer: COMMERCIAL

## 2024-12-31 ENCOUNTER — ANESTHESIA (OUTPATIENT)
Dept: SURGERY | Facility: MEDICAL CENTER | Age: 44
End: 2024-12-31
Payer: MEDICARE

## 2024-12-31 ENCOUNTER — APPOINTMENT (OUTPATIENT)
Dept: RADIOLOGY | Facility: MEDICAL CENTER | Age: 44
End: 2024-12-31
Attending: STUDENT IN AN ORGANIZED HEALTH CARE EDUCATION/TRAINING PROGRAM
Payer: MEDICARE

## 2024-12-31 ENCOUNTER — ANESTHESIA EVENT (OUTPATIENT)
Dept: SURGERY | Facility: MEDICAL CENTER | Age: 44
End: 2024-12-31
Payer: MEDICARE

## 2024-12-31 ENCOUNTER — HOSPITAL ENCOUNTER (OUTPATIENT)
Facility: MEDICAL CENTER | Age: 44
End: 2024-12-31
Attending: STUDENT IN AN ORGANIZED HEALTH CARE EDUCATION/TRAINING PROGRAM | Admitting: STUDENT IN AN ORGANIZED HEALTH CARE EDUCATION/TRAINING PROGRAM
Payer: MEDICARE

## 2024-12-31 VITALS
DIASTOLIC BLOOD PRESSURE: 85 MMHG | HEART RATE: 112 BPM | RESPIRATION RATE: 16 BRPM | TEMPERATURE: 96.5 F | BODY MASS INDEX: 39.36 KG/M2 | WEIGHT: 244.93 LBS | SYSTOLIC BLOOD PRESSURE: 137 MMHG | OXYGEN SATURATION: 93 % | HEIGHT: 66 IN

## 2024-12-31 DIAGNOSIS — N20.0 NEPHROLITHIASIS: ICD-10-CM

## 2024-12-31 DIAGNOSIS — N13.30 HYDRONEPHROSIS, UNSPECIFIED HYDRONEPHROSIS TYPE: ICD-10-CM

## 2024-12-31 LAB — GLUCOSE BLD STRIP.AUTO-MCNC: 121 MG/DL (ref 65–99)

## 2024-12-31 PROCEDURE — 700101 HCHG RX REV CODE 250: Mod: UD | Performed by: STUDENT IN AN ORGANIZED HEALTH CARE EDUCATION/TRAINING PROGRAM

## 2024-12-31 PROCEDURE — 52352 CYSTOURETERO W/STONE REMOVE: CPT | Performed by: STUDENT IN AN ORGANIZED HEALTH CARE EDUCATION/TRAINING PROGRAM

## 2024-12-31 PROCEDURE — 700102 HCHG RX REV CODE 250 W/ 637 OVERRIDE(OP): Mod: UD | Performed by: STUDENT IN AN ORGANIZED HEALTH CARE EDUCATION/TRAINING PROGRAM

## 2024-12-31 PROCEDURE — 700111 HCHG RX REV CODE 636 W/ 250 OVERRIDE (IP): Mod: JZ,UD | Performed by: STUDENT IN AN ORGANIZED HEALTH CARE EDUCATION/TRAINING PROGRAM

## 2024-12-31 PROCEDURE — 700105 HCHG RX REV CODE 258: Mod: UD | Performed by: STUDENT IN AN ORGANIZED HEALTH CARE EDUCATION/TRAINING PROGRAM

## 2024-12-31 PROCEDURE — 160035 HCHG PACU - 1ST 60 MINS PHASE I: Performed by: STUDENT IN AN ORGANIZED HEALTH CARE EDUCATION/TRAINING PROGRAM

## 2024-12-31 PROCEDURE — 110371 HCHG SHELL REV 272: Performed by: STUDENT IN AN ORGANIZED HEALTH CARE EDUCATION/TRAINING PROGRAM

## 2024-12-31 PROCEDURE — C1894 INTRO/SHEATH, NON-LASER: HCPCS | Performed by: STUDENT IN AN ORGANIZED HEALTH CARE EDUCATION/TRAINING PROGRAM

## 2024-12-31 PROCEDURE — 160039 HCHG SURGERY MINUTES - EA ADDL 1 MIN LEVEL 3: Performed by: STUDENT IN AN ORGANIZED HEALTH CARE EDUCATION/TRAINING PROGRAM

## 2024-12-31 PROCEDURE — A9270 NON-COVERED ITEM OR SERVICE: HCPCS | Mod: UD | Performed by: STUDENT IN AN ORGANIZED HEALTH CARE EDUCATION/TRAINING PROGRAM

## 2024-12-31 PROCEDURE — 160025 RECOVERY II MINUTES (STATS): Performed by: STUDENT IN AN ORGANIZED HEALTH CARE EDUCATION/TRAINING PROGRAM

## 2024-12-31 PROCEDURE — 160036 HCHG PACU - EA ADDL 30 MINS PHASE I: Performed by: STUDENT IN AN ORGANIZED HEALTH CARE EDUCATION/TRAINING PROGRAM

## 2024-12-31 PROCEDURE — C1769 GUIDE WIRE: HCPCS | Performed by: STUDENT IN AN ORGANIZED HEALTH CARE EDUCATION/TRAINING PROGRAM

## 2024-12-31 PROCEDURE — 88300 SURGICAL PATH GROSS: CPT

## 2024-12-31 PROCEDURE — 160009 HCHG ANES TIME/MIN: Performed by: STUDENT IN AN ORGANIZED HEALTH CARE EDUCATION/TRAINING PROGRAM

## 2024-12-31 PROCEDURE — 160048 HCHG OR STATISTICAL LEVEL 1-5: Performed by: STUDENT IN AN ORGANIZED HEALTH CARE EDUCATION/TRAINING PROGRAM

## 2024-12-31 PROCEDURE — 82365 CALCULUS SPECTROSCOPY: CPT

## 2024-12-31 PROCEDURE — C1747 HCHG SHELL REV 278 C1747: HCPCS | Performed by: STUDENT IN AN ORGANIZED HEALTH CARE EDUCATION/TRAINING PROGRAM

## 2024-12-31 PROCEDURE — 82962 GLUCOSE BLOOD TEST: CPT

## 2024-12-31 PROCEDURE — 160028 HCHG SURGERY MINUTES - 1ST 30 MINS LEVEL 3: Performed by: STUDENT IN AN ORGANIZED HEALTH CARE EDUCATION/TRAINING PROGRAM

## 2024-12-31 PROCEDURE — C1758 CATHETER, URETERAL: HCPCS | Performed by: STUDENT IN AN ORGANIZED HEALTH CARE EDUCATION/TRAINING PROGRAM

## 2024-12-31 PROCEDURE — RXMED WILLOW AMBULATORY MEDICATION CHARGE: Performed by: STUDENT IN AN ORGANIZED HEALTH CARE EDUCATION/TRAINING PROGRAM

## 2024-12-31 PROCEDURE — 160046 HCHG PACU - 1ST 60 MINS PHASE II: Performed by: STUDENT IN AN ORGANIZED HEALTH CARE EDUCATION/TRAINING PROGRAM

## 2024-12-31 PROCEDURE — 160002 HCHG RECOVERY MINUTES (STAT): Performed by: STUDENT IN AN ORGANIZED HEALTH CARE EDUCATION/TRAINING PROGRAM

## 2024-12-31 RX ORDER — DIPHENHYDRAMINE HYDROCHLORIDE 50 MG/ML
12.5 INJECTION INTRAMUSCULAR; INTRAVENOUS
Status: DISCONTINUED | OUTPATIENT
Start: 2024-12-31 | End: 2024-12-31 | Stop reason: HOSPADM

## 2024-12-31 RX ORDER — HYDROMORPHONE HYDROCHLORIDE 1 MG/ML
0.4 INJECTION, SOLUTION INTRAMUSCULAR; INTRAVENOUS; SUBCUTANEOUS
Status: DISCONTINUED | OUTPATIENT
Start: 2024-12-31 | End: 2024-12-31 | Stop reason: HOSPADM

## 2024-12-31 RX ORDER — EPHEDRINE SULFATE 50 MG/ML
5 INJECTION, SOLUTION INTRAVENOUS
Status: DISCONTINUED | OUTPATIENT
Start: 2024-12-31 | End: 2024-12-31 | Stop reason: HOSPADM

## 2024-12-31 RX ORDER — ONDANSETRON 2 MG/ML
INJECTION INTRAMUSCULAR; INTRAVENOUS PRN
Status: DISCONTINUED | OUTPATIENT
Start: 2024-12-31 | End: 2024-12-31 | Stop reason: SURG

## 2024-12-31 RX ORDER — SODIUM CHLORIDE, SODIUM LACTATE, POTASSIUM CHLORIDE, CALCIUM CHLORIDE 600; 310; 30; 20 MG/100ML; MG/100ML; MG/100ML; MG/100ML
INJECTION, SOLUTION INTRAVENOUS CONTINUOUS
Status: DISCONTINUED | OUTPATIENT
Start: 2024-12-31 | End: 2024-12-31 | Stop reason: HOSPADM

## 2024-12-31 RX ORDER — MEPERIDINE HYDROCHLORIDE 25 MG/ML
12.5 INJECTION INTRAMUSCULAR; INTRAVENOUS; SUBCUTANEOUS
Status: DISCONTINUED | OUTPATIENT
Start: 2024-12-31 | End: 2024-12-31 | Stop reason: HOSPADM

## 2024-12-31 RX ORDER — HYDROMORPHONE HYDROCHLORIDE 1 MG/ML
0.1 INJECTION, SOLUTION INTRAMUSCULAR; INTRAVENOUS; SUBCUTANEOUS
Status: DISCONTINUED | OUTPATIENT
Start: 2024-12-31 | End: 2024-12-31 | Stop reason: HOSPADM

## 2024-12-31 RX ORDER — ONDANSETRON 2 MG/ML
4 INJECTION INTRAMUSCULAR; INTRAVENOUS
Status: DISCONTINUED | OUTPATIENT
Start: 2024-12-31 | End: 2024-12-31 | Stop reason: HOSPADM

## 2024-12-31 RX ORDER — OXYCODONE HYDROCHLORIDE 5 MG/1
5 TABLET ORAL EVERY 6 HOURS PRN
COMMUNITY

## 2024-12-31 RX ORDER — HYDROMORPHONE HYDROCHLORIDE 1 MG/ML
0.2 INJECTION, SOLUTION INTRAMUSCULAR; INTRAVENOUS; SUBCUTANEOUS
Status: DISCONTINUED | OUTPATIENT
Start: 2024-12-31 | End: 2024-12-31 | Stop reason: HOSPADM

## 2024-12-31 RX ORDER — METHOCARBAMOL 100 MG/ML
1000 INJECTION, SOLUTION INTRAMUSCULAR; INTRAVENOUS
Status: COMPLETED | OUTPATIENT
Start: 2024-12-31 | End: 2024-12-31

## 2024-12-31 RX ORDER — OXYBUTYNIN CHLORIDE 5 MG/1
5 TABLET ORAL ONCE
Status: COMPLETED | OUTPATIENT
Start: 2024-12-31 | End: 2024-12-31

## 2024-12-31 RX ORDER — OXYCODONE HCL 5 MG/5 ML
5 SOLUTION, ORAL ORAL
Status: COMPLETED | OUTPATIENT
Start: 2024-12-31 | End: 2024-12-31

## 2024-12-31 RX ORDER — HALOPERIDOL 5 MG/ML
1 INJECTION INTRAMUSCULAR
Status: DISCONTINUED | OUTPATIENT
Start: 2024-12-31 | End: 2024-12-31 | Stop reason: HOSPADM

## 2024-12-31 RX ORDER — CELECOXIB 200 MG/1
200 CAPSULE ORAL ONCE
Status: COMPLETED | OUTPATIENT
Start: 2024-12-31 | End: 2024-12-31

## 2024-12-31 RX ORDER — HYDRALAZINE HYDROCHLORIDE 20 MG/ML
5 INJECTION INTRAMUSCULAR; INTRAVENOUS
Status: DISCONTINUED | OUTPATIENT
Start: 2024-12-31 | End: 2024-12-31 | Stop reason: HOSPADM

## 2024-12-31 RX ORDER — ALBUTEROL SULFATE 5 MG/ML
2.5 SOLUTION RESPIRATORY (INHALATION)
Status: DISCONTINUED | OUTPATIENT
Start: 2024-12-31 | End: 2024-12-31 | Stop reason: HOSPADM

## 2024-12-31 RX ORDER — KETOROLAC TROMETHAMINE 15 MG/ML
15 INJECTION, SOLUTION INTRAMUSCULAR; INTRAVENOUS ONCE
Status: COMPLETED | OUTPATIENT
Start: 2024-12-31 | End: 2024-12-31

## 2024-12-31 RX ORDER — CEFAZOLIN SODIUM 1 G/3ML
INJECTION, POWDER, FOR SOLUTION INTRAMUSCULAR; INTRAVENOUS PRN
Status: DISCONTINUED | OUTPATIENT
Start: 2024-12-31 | End: 2024-12-31 | Stop reason: SURG

## 2024-12-31 RX ORDER — DEXAMETHASONE SODIUM PHOSPHATE 4 MG/ML
INJECTION, SOLUTION INTRA-ARTICULAR; INTRALESIONAL; INTRAMUSCULAR; INTRAVENOUS; SOFT TISSUE PRN
Status: DISCONTINUED | OUTPATIENT
Start: 2024-12-31 | End: 2024-12-31 | Stop reason: SURG

## 2024-12-31 RX ORDER — LIDOCAINE HYDROCHLORIDE 20 MG/ML
INJECTION, SOLUTION EPIDURAL; INFILTRATION; INTRACAUDAL; PERINEURAL PRN
Status: DISCONTINUED | OUTPATIENT
Start: 2024-12-31 | End: 2024-12-31 | Stop reason: SURG

## 2024-12-31 RX ORDER — SODIUM CHLORIDE, SODIUM LACTATE, POTASSIUM CHLORIDE, CALCIUM CHLORIDE 600; 310; 30; 20 MG/100ML; MG/100ML; MG/100ML; MG/100ML
INJECTION, SOLUTION INTRAVENOUS
Status: DISCONTINUED | OUTPATIENT
Start: 2024-12-31 | End: 2024-12-31 | Stop reason: SURG

## 2024-12-31 RX ORDER — PHENAZOPYRIDINE HYDROCHLORIDE 200 MG/1
200 TABLET, FILM COATED ORAL 3 TIMES DAILY PRN
COMMUNITY

## 2024-12-31 RX ORDER — MIDAZOLAM HYDROCHLORIDE 1 MG/ML
INJECTION INTRAMUSCULAR; INTRAVENOUS PRN
Status: DISCONTINUED | OUTPATIENT
Start: 2024-12-31 | End: 2024-12-31 | Stop reason: SURG

## 2024-12-31 RX ORDER — PHENAZOPYRIDINE HYDROCHLORIDE 200 MG/1
200 TABLET, FILM COATED ORAL 3 TIMES DAILY PRN
Qty: 9 TABLET | Refills: 0 | Status: SHIPPED | OUTPATIENT
Start: 2024-12-31

## 2024-12-31 RX ORDER — ACETAMINOPHEN 500 MG
1000 TABLET ORAL ONCE
Status: COMPLETED | OUTPATIENT
Start: 2024-12-31 | End: 2024-12-31

## 2024-12-31 RX ORDER — OXYCODONE HCL 5 MG/5 ML
10 SOLUTION, ORAL ORAL
Status: COMPLETED | OUTPATIENT
Start: 2024-12-31 | End: 2024-12-31

## 2024-12-31 RX ORDER — ROCURONIUM BROMIDE 10 MG/ML
INJECTION, SOLUTION INTRAVENOUS PRN
Status: DISCONTINUED | OUTPATIENT
Start: 2024-12-31 | End: 2024-12-31 | Stop reason: SURG

## 2024-12-31 RX ORDER — HYDROCODONE BITARTRATE AND ACETAMINOPHEN 5; 325 MG/1; MG/1
1 TABLET ORAL EVERY 6 HOURS PRN
Qty: 16 TABLET | Refills: 0 | Status: SHIPPED | OUTPATIENT
Start: 2024-12-31 | End: 2025-01-04

## 2024-12-31 RX ADMIN — KETOROLAC TROMETHAMINE 15 MG: 15 INJECTION, SOLUTION INTRAMUSCULAR; INTRAVENOUS at 19:07

## 2024-12-31 RX ADMIN — CEFAZOLIN 3 G: 1 INJECTION, POWDER, FOR SOLUTION INTRAMUSCULAR; INTRAVENOUS at 16:51

## 2024-12-31 RX ADMIN — SODIUM CHLORIDE, POTASSIUM CHLORIDE, SODIUM LACTATE AND CALCIUM CHLORIDE: 600; 310; 30; 20 INJECTION, SOLUTION INTRAVENOUS at 16:42

## 2024-12-31 RX ADMIN — HYDROMORPHONE HYDROCHLORIDE 0.4 MG: 1 INJECTION, SOLUTION INTRAMUSCULAR; INTRAVENOUS; SUBCUTANEOUS at 18:08

## 2024-12-31 RX ADMIN — DEXAMETHASONE SODIUM PHOSPHATE 8 MG: 4 INJECTION INTRA-ARTICULAR; INTRALESIONAL; INTRAMUSCULAR; INTRAVENOUS; SOFT TISSUE at 16:51

## 2024-12-31 RX ADMIN — ONDANSETRON 4 MG: 2 INJECTION INTRAMUSCULAR; INTRAVENOUS at 17:27

## 2024-12-31 RX ADMIN — OXYCODONE HYDROCHLORIDE 10 MG: 5 SOLUTION ORAL at 17:43

## 2024-12-31 RX ADMIN — PROPOFOL 150 MG: 10 INJECTION, EMULSION INTRAVENOUS at 16:47

## 2024-12-31 RX ADMIN — LIDOCAINE HYDROCHLORIDE 60 MG: 20 INJECTION, SOLUTION EPIDURAL; INFILTRATION; INTRACAUDAL; PERINEURAL at 16:47

## 2024-12-31 RX ADMIN — HYDROMORPHONE HYDROCHLORIDE 0.4 MG: 1 INJECTION, SOLUTION INTRAMUSCULAR; INTRAVENOUS; SUBCUTANEOUS at 18:34

## 2024-12-31 RX ADMIN — ROCURONIUM BROMIDE 50 MG: 50 INJECTION, SOLUTION INTRAVENOUS at 16:47

## 2024-12-31 RX ADMIN — FENTANYL CITRATE 50 MCG: 50 INJECTION, SOLUTION INTRAMUSCULAR; INTRAVENOUS at 18:01

## 2024-12-31 RX ADMIN — FENTANYL CITRATE 50 MCG: 50 INJECTION, SOLUTION INTRAMUSCULAR; INTRAVENOUS at 16:47

## 2024-12-31 RX ADMIN — OXYBUTYNIN CHLORIDE 5 MG: 5 TABLET ORAL at 19:11

## 2024-12-31 RX ADMIN — MEPERIDINE HYDROCHLORIDE 12.5 MG: 25 INJECTION INTRAMUSCULAR; INTRAVENOUS; SUBCUTANEOUS at 18:37

## 2024-12-31 RX ADMIN — PROPOFOL 50 MG: 10 INJECTION, EMULSION INTRAVENOUS at 17:29

## 2024-12-31 RX ADMIN — CELECOXIB 200 MG: 200 CAPSULE ORAL at 15:05

## 2024-12-31 RX ADMIN — MIDAZOLAM HYDROCHLORIDE 2 MG: 1 INJECTION, SOLUTION INTRAMUSCULAR; INTRAVENOUS at 16:40

## 2024-12-31 RX ADMIN — METHOCARBAMOL 1000 MG: 100 INJECTION, SOLUTION INTRAMUSCULAR; INTRAVENOUS at 19:07

## 2024-12-31 RX ADMIN — FENTANYL CITRATE 50 MCG: 50 INJECTION, SOLUTION INTRAMUSCULAR; INTRAVENOUS at 17:43

## 2024-12-31 RX ADMIN — FENTANYL CITRATE 100 MCG: 50 INJECTION, SOLUTION INTRAMUSCULAR; INTRAVENOUS at 17:12

## 2024-12-31 RX ADMIN — HYDROMORPHONE HYDROCHLORIDE 0.2 MG: 1 INJECTION, SOLUTION INTRAMUSCULAR; INTRAVENOUS; SUBCUTANEOUS at 18:43

## 2024-12-31 RX ADMIN — ACETAMINOPHEN 1000 MG: 500 TABLET ORAL at 15:05

## 2024-12-31 RX ADMIN — SUGAMMADEX 200 MG: 100 INJECTION, SOLUTION INTRAVENOUS at 17:27

## 2024-12-31 ASSESSMENT — FIBROSIS 4 INDEX: FIB4 SCORE: 0.28

## 2024-12-31 ASSESSMENT — PAIN SCALES - GENERAL: PAIN_LEVEL: 3

## 2024-12-31 ASSESSMENT — PAIN DESCRIPTION - PAIN TYPE
TYPE: SURGICAL PAIN

## 2024-12-31 NOTE — ANESTHESIA PREPROCEDURE EVALUATION
Case: [id number] Date/Time: 12/31/24 2874    Procedures:       CYSTOSCOPY, RIGHT URETEROSCOPY, LASER LITHOTRIPSY, STENT PLACEMENT      URETEROSCOPY      LITHOTRIPSY, USING LASER    Pre-op diagnosis: NEPHROLITHIASIS    Location: [facility] OR 12 / SURGERY [facility]    Surgeons: Karolyn Servin M.D.            Relevant Problems   CARDIAC   (positive) Primary hypertension         (positive) Hydronephrosis   (positive) Nephrolithiasis       Physical Exam    Airway   Mallampati: II  TM distance: >3 FB  Neck ROM: full       Cardiovascular - normal exam  Rhythm: regular  Rate: normal  (-) murmur     Dental - normal exam           Pulmonary - normal exam  Breath sounds clear to auscultation     Abdominal    Neurological - normal exam                   Anesthesia Plan    ASA 2       Plan - general       Airway plan will be ETT          Induction: intravenous    Postoperative Plan: Postoperative administration of opioids is intended.    Pertinent diagnostic labs and testing reviewed    Informed Consent:    Anesthetic plan and risks discussed with patient.    Use of blood products discussed with: patient whom consented to blood products.

## 2025-01-01 NOTE — OR NURSING
1944 Pt arrived from PACU  Pt is A&O x4, pt's VSS, denies any nausea and vomiting, stated that pain is tolerable to go home. Meds picked up by pt's son and delivered to pt.    1958 Patient states ready to d/c home. Discharge instructions reviewed and provided to pt. Pt verbalizes understanding. Pt states all questions have been answered. Signed copy in chart. Pt states that all personal belongings are in possession.     2015 Pt d/c via wheelchair with family,escorted by RN.

## 2025-01-01 NOTE — INTERVAL H&P NOTE
Patient with a stent placed mid December, here today for definitive stone management with ureteroscopy and laser lithotripsy, possible stent exchange. No major changes in overall health.

## 2025-01-01 NOTE — DISCHARGE INSTRUCTIONS
HOME CARE INSTRUCTIONS    ACTIVITY: Rest and take it easy for the first 24 hours.  A responsible adult is recommended to remain with you during that time.  It is normal to feel sleepy.  We encourage you to not do anything that requires balance, judgment or coordination.    FOR 24 HOURS DO NOT:  Drive, operate machinery or run household appliances.  Drink beer or alcoholic beverages.  Make important decisions or sign legal documents.    DIET: To avoid nausea, slowly advance diet as tolerated, avoiding spicy or greasy foods for the first day.  Add more substantial food to your diet according to your physician's instructions.     SURGICAL DRESSING/BATHING: OK to shower tomorrow. DO not submerge in water for at least one week     MEDICATIONS: Resume taking daily medication.  Take prescribed pain medication with food.  If no medication is prescribed, you may take non-aspirin pain medication if needed.  PAIN MEDICATION CAN BE VERY CONSTIPATING.  Take a stool softener or laxative such as senokot, pericolace, or milk of magnesia if needed.    Prescription given for Pyridium and Norco .    Last pain medication given Tylenol 1000mg and Celebrex 200mg at 3:05 pm; Oxycodone 10mg at 5:45 pm     A follow-up appointment should be arranged with your doctor in 1-2 weeks ; call to schedule.    You should CALL YOUR PHYSICIAN if you develop:  Fever greater than 101 degrees F.  Pain not relieved by medication, or persistent nausea or vomiting.  Excessive bleeding (blood soaking through dressing) or unexpected drainage from the wound.  Extreme redness or swelling around the incision site, drainage of pus or foul smelling drainage.  Inability to urinate or empty your bladder within 8 hours.  Problems with breathing or chest pain.    You should call 911 if you develop problems with breathing or chest pain.  If you are unable to contact your doctor or surgical center, you should go to the nearest emergency room or urgent care center.   Physician's telephone #: Dr Servin [phone]     MILD FLU-LIKE SYMPTOMS ARE NORMAL.  YOU MAY EXPERIENCE GENERALIZED MUSCLE ACHES, THROAT IRRITATION, HEADACHE AND/OR SOME NAUSEA.    If any questions arise, call your doctor.  If your doctor is not available, please feel free to call the Surgical Center at [phone].  The Center is open Monday through Friday from 7AM to 7PM.      A registered nurse may call you a few days after your surgery to see how you are doing after your procedure.    You may also receive a survey in the mail within the next two weeks and we ask that you take a few moments to complete the survey and return it to us.  Our goal is to provide you with very good care and we value your comments.     Depression / Suicide Risk    As you are discharged from this [facility] facility, it is important to learn how to keep safe from harming yourself.    Recognize the warning signs:  Abrupt changes in personality, positive or negative- including increase in energy   Giving away possessions  Change in eating patterns- significant weight changes-  positive or negative  Change in sleeping patterns- unable to sleep or sleeping all the time   Unwillingness or inability to communicate  Depression  Unusual sadness, discouragement and loneliness  Talk of wanting to die  Neglect of personal appearance   Rebelliousness- reckless behavior  Withdrawal from people/activities they love  Confusion- inability to concentrate     If you or a loved one observes any of these behaviors or has concerns about self-harm, here's what you can do:  Talk about it- your feelings and reasons for harming yourself  Remove any means that you might use to hurt yourself (examples: pills, rope, extension cords, firearm)  Get professional help from the community (Mental Health, Substance Abuse, psychological counseling)  Do not be alone:Call your Safe Contact- someone whom you trust who will be there for you.  Call your local CRISIS  HOTLINE 941-8650 or 491-969-7314  Call your local Children's Mobile Crisis Response Team [place] (522) 463-3647 or www.[employer]  Call the toll free National Suicide Prevention Hotlines   National Suicide Prevention Lifeline 209-781-VKJM (3667)  National [employer] 800-SUICIDE (221-2045)    I acknowledge receipt and understanding of these Home Care instructions.

## 2025-01-01 NOTE — ANESTHESIA TIME REPORT
Anesthesia Start and Stop Event Times       Date Time Event    12/31/2024 1532 Ready for Procedure     1642 Anesthesia Start     1737 Anesthesia Stop          Responsible Staff  12/31/24      Name Role Begin End    Mauri Amos D.O. Anesth 1642 1737          Overtime Reason:  no overtime (within assigned shift)    Comments:

## 2025-01-01 NOTE — OP REPORT
SURGEON: Dr. Karolyn Servin      ANESTHESIA: General (general endotracheal tube)      PRE-OPERATIVE DIAGNOSIS: right ureteral stone    POST-OPERATIVE DIAGNOSIS: Same      NAME OF PROCEDURE: Cystoscopy, right ureteroscopy with stone basket extraction and physician interpretation of fluoroscopy total time <1 hour    FINDINGS OF PROCEDURE: stone was fragmented by the access sheath, one fragment required basket extraction. All other pieces were flushed down the access sheath. No ureteral edema or trauma, stent was not replaced     EBL: 0cc      COMPLICATIONS: None      PATIENT CONDITION: stable      INDICATIONS: Jennifer Huffman is a 44 y.o. female who agreed to above procedure for further management of kidney stones after complete discussion of risks, benefits, and alternatives.      PROCEDURE:     After informed consent was obtained in the preoperative care unit, the patient was taken to the OR on a stretcher. The patient was properly identified and placed in supine position per OR protocol. The patient was given a prophylactic dose of ancef 2 grams. General (general endotracheal tube) was administered. The patient was then placed in dorsal lithotomy, prepped and draped in a standard sterile fashion.  A timeout was performed with all parties in agreement.       A 22Fr rigid cystoscope was inserted per urethra. A full inspection of the bladder was completed. There were no lesions or masses, the UOs were in orthotopic position effluxing clear urine. The right ureteral stent was grasped and brought out to the level of the meatus. A sensor wire was passed into the right ureteral stent up to the level of the kidney, confirmed under fluoroscopy. A dual lumen was passed over the sensor wire into the distal ureter and a second sensor wire was passed through the dual lumen up to the level of the kidney confirmed with fluoroscopy and the dual lumen was removed.    A 11/13Fr x 36cm ureteral access sheath was passed over one  of the sensor wires and advanced to the level of the UPJ under fluoroscopic guidance. The inner lumen and one of the sensor wires was removed. The flexible ureteroscope was assembled and advanced through the sheath to the level of the kidney under direct vision, where a thorough pyeloscopy was performed. I found one fragment in the lower pole, this was basket extracted. I performed a final pyeloscopy which was negative for any residual stone burden. I removed the ureteroscope and ureteral access sheath under direct vision to perform a final ureteral survey, I encountered several additional fragments that were able to be evacuated down the access sheath. No additional stone fragments or ureteral injuries were seen.     I drained the patient's bladder. This concluded the procedure. The patient tolerated it well and was transferred to the PACU in stable condition.        DISPOSITION: The patient will be discharged home with plan for  renal US in 6 weeks .

## 2025-01-01 NOTE — OR NURSING
Pt arrived to PACU from OR with anesthesiologist and OR RN. AAOx4. VSS. Pt c/o cramping pain to the R. Side of abd uncontrolled with pain meds. MD Servin notified, and ordered PVR. Pt urinated in bathroom, bladder scan showed 17mL. Heat packs applied to abd for comfort. Report given to TRACY Craig phase 2.

## 2025-01-01 NOTE — ANESTHESIA POSTPROCEDURE EVALUATION
Patient: Jennifer Huffman    Procedure Summary       Date: 12/31/24 Room / Location: [place] [address] / SURGERY [facility]    Anesthesia Start: 1642 Anesthesia Stop: 1737    Procedures:       CYSTOSCOPY, RIGHT URETEROSCOPY (Ureter)      URETEROSCOPY (Ureter) Diagnosis: (NEPHROLITHIASIS)    Surgeons: Karolyn Servin M.D. Responsible Provider: Mauri Amos D.O.    Anesthesia Type: general ASA Status: 2            Final Anesthesia Type: general  Last vitals  BP   Blood Pressure: 126/89    Temp   36.4 °C (97.5 °F)    Pulse   (!) 111   Resp   20    SpO2   98 %      Anesthesia Post Evaluation    Patient location during evaluation: PACU  Patient participation: complete - patient participated  Level of consciousness: awake and alert  Pain score: 3    Airway patency: patent  Anesthetic complications: no  Cardiovascular status: hemodynamically stable  Respiratory status: acceptable  Hydration status: euvolemic    PONV: none          No notable events documented.     Nurse Pain Score: 8 (NPRS)

## 2025-01-01 NOTE — DISCHARGE INSTR - OTHER INFO
Ureteroscopy    Increased Urinary Urgency and Frequency: You may experience an increased urgency to urinate and more frequent urination than usual.   Pain or Discomfort: Some discomfort or mild pain is common, especially during urination. This can vary from person to person, with some individuals experiencing more pain than others. Pain may also be felt in the lower abdomen or groin area.   Hematuria (Blood in Urine): It's not uncommon to notice some blood in your urine (hematuria). The more active you are, the more blood you may see. You are not causing permanent damange by being active. Drink plenty of water if your urine becomes bloody.   Urinary Tract Infections (UTIs): If you develop symptoms such as fever, chills, burning during urination, or cloudy, foul-smelling urine, contact your healthcare provider, as you may need antibiotics.   Medications: Your doctor may prescribe medications to help manage the discomfort. These may include pain relievers and medications to relax the urinary tract.   Follow-up Appointments: You will likely have scheduled follow-up appointments with your healthcare provider to monitor the stent's function and assess your symptoms. These appointments may include imaging studies, such as X-rays or ultrasounds.   Activity Restrictions: There are no activity restrictions. activity are generally encouraged.   Fluid Intake: Drinking plenty of water can help flush the urinary system and reduce the risk of UTIs. Your doctor may recommend increasing your fluid intake.

## 2025-01-01 NOTE — ANESTHESIA PROCEDURE NOTES
Airway    Date/Time: 12/31/2024 4:48 PM    Performed by: Mauri Amos D.O.  Authorized by: Mauri Amos D.O.    Location:  OR  Urgency:  Elective  Difficult Airway: No    Indications for Airway Management:  Anesthesia      Spontaneous Ventilation: absent    Sedation Level:  Deep  Preoxygenated: Yes    Patient Position:  Sniffing  Mask Difficulty Assessment:  2 - vent by mask + OA or adjuvant +/- NMBA  Final Airway Type:  Endotracheal airway  Final Endotracheal Airway:  ETT  Cuffed: Yes    Technique Used for Successful ETT Placement:  Direct laryngoscopy    Insertion Site:  Oral  Blade Type:  Srinivas  Laryngoscope Blade/Videolaryngoscope Blade Size:  3  ETT Size (mm):  7.0  Measured from:  Teeth  ETT to Teeth (cm):  22  Placement Verified by: auscultation and capnometry    Cormack-Lehane Classification:  Grade I - full view of glottis  Number of Attempts at Approach:  1

## 2025-01-02 LAB — PATHOLOGY CONSULT NOTE: NORMAL

## 2025-01-03 PROCEDURE — RXMED WILLOW AMBULATORY MEDICATION CHARGE: Performed by: STUDENT IN AN ORGANIZED HEALTH CARE EDUCATION/TRAINING PROGRAM

## 2025-01-04 ENCOUNTER — PHARMACY VISIT (OUTPATIENT)
Dept: PHARMACY | Facility: MEDICAL CENTER | Age: 45
End: 2025-01-04
Payer: COMMERCIAL

## 2025-01-04 LAB
APPEARANCE STONE: NORMAL
COMPN STONE: NORMAL
SPECIMEN WT: 14 MG

## 2025-01-23 PROCEDURE — RXMED WILLOW AMBULATORY MEDICATION CHARGE

## 2025-01-23 RX ORDER — MECLIZINE HYDROCHLORIDE 25 MG/1
TABLET ORAL
Qty: 42 TABLET | Refills: 3 | Status: CANCELLED | OUTPATIENT
Start: 2025-01-23

## 2025-02-04 ENCOUNTER — PHARMACY VISIT (OUTPATIENT)
Dept: PHARMACY | Facility: MEDICAL CENTER | Age: 45
End: 2025-02-04
Payer: COMMERCIAL

## 2025-02-13 ENCOUNTER — APPOINTMENT (OUTPATIENT)
Dept: RADIOLOGY | Facility: MEDICAL CENTER | Age: 45
End: 2025-02-13
Attending: STUDENT IN AN ORGANIZED HEALTH CARE EDUCATION/TRAINING PROGRAM
Payer: MEDICARE

## 2025-02-24 PROCEDURE — RXMED WILLOW AMBULATORY MEDICATION CHARGE: Performed by: STUDENT IN AN ORGANIZED HEALTH CARE EDUCATION/TRAINING PROGRAM

## 2025-02-25 PROCEDURE — RXMED WILLOW AMBULATORY MEDICATION CHARGE

## 2025-02-26 ENCOUNTER — PHARMACY VISIT (OUTPATIENT)
Dept: PHARMACY | Facility: MEDICAL CENTER | Age: 45
End: 2025-02-26
Payer: COMMERCIAL

## 2025-03-12 PROCEDURE — RXMED WILLOW AMBULATORY MEDICATION CHARGE

## 2025-03-17 ENCOUNTER — PHARMACY VISIT (OUTPATIENT)
Dept: PHARMACY | Facility: MEDICAL CENTER | Age: 45
End: 2025-03-17
Payer: COMMERCIAL

## 2025-03-26 PROCEDURE — RXMED WILLOW AMBULATORY MEDICATION CHARGE

## 2025-03-31 ENCOUNTER — PHARMACY VISIT (OUTPATIENT)
Dept: PHARMACY | Facility: MEDICAL CENTER | Age: 45
End: 2025-03-31
Payer: COMMERCIAL

## 2025-04-15 ENCOUNTER — APPOINTMENT (OUTPATIENT)
Dept: RADIOLOGY | Facility: MEDICAL CENTER | Age: 45
End: 2025-04-15
Attending: OBSTETRICS & GYNECOLOGY
Payer: MEDICARE

## 2025-04-26 PROCEDURE — RXMED WILLOW AMBULATORY MEDICATION CHARGE

## 2025-04-28 ENCOUNTER — PHARMACY VISIT (OUTPATIENT)
Dept: PHARMACY | Facility: MEDICAL CENTER | Age: 45
End: 2025-04-28
Payer: COMMERCIAL

## 2025-05-01 PROCEDURE — RXMED WILLOW AMBULATORY MEDICATION CHARGE

## 2025-05-04 ENCOUNTER — PHARMACY VISIT (OUTPATIENT)
Dept: PHARMACY | Facility: MEDICAL CENTER | Age: 45
End: 2025-05-04
Payer: COMMERCIAL

## 2025-05-04 DIAGNOSIS — N93.9 ABNORMAL UTERINE BLEEDING: ICD-10-CM

## 2025-05-04 RX ORDER — SENNOSIDES 8.6 MG
TABLET ORAL
Qty: 60 TABLET | Refills: 2 | Status: CANCELLED | OUTPATIENT
Start: 2025-05-04

## 2025-05-04 RX ORDER — AMLODIPINE BESYLATE 10 MG/1
TABLET ORAL
Qty: 30 TABLET | Refills: 12 | Status: CANCELLED | OUTPATIENT
Start: 2025-05-04

## 2025-05-04 RX ORDER — CARVEDILOL 25 MG/1
TABLET ORAL
Qty: 30 TABLET | Refills: 12 | Status: CANCELLED | OUTPATIENT
Start: 2025-05-04

## 2025-05-06 PROCEDURE — RXMED WILLOW AMBULATORY MEDICATION CHARGE: Performed by: PSYCHIATRY & NEUROLOGY

## 2025-05-06 PROCEDURE — RXMED WILLOW AMBULATORY MEDICATION CHARGE

## 2025-05-07 ENCOUNTER — PHARMACY VISIT (OUTPATIENT)
Dept: PHARMACY | Facility: MEDICAL CENTER | Age: 45
End: 2025-05-07
Payer: COMMERCIAL

## 2025-05-07 PROCEDURE — RXMED WILLOW AMBULATORY MEDICATION CHARGE: Performed by: OBSTETRICS & GYNECOLOGY

## 2025-05-07 RX ORDER — MEDROXYPROGESTERONE ACETATE 10 MG
10 TABLET ORAL DAILY
Qty: 30 TABLET | Refills: 1 | Status: SHIPPED | OUTPATIENT
Start: 2025-05-07

## 2025-05-07 RX ORDER — SENNOSIDES 8.6 MG
TABLET ORAL
Qty: 60 TABLET | Refills: 2 | Status: CANCELLED | OUTPATIENT
Start: 2025-05-04

## 2025-05-07 RX ORDER — AMLODIPINE BESYLATE 10 MG/1
TABLET ORAL
Qty: 30 TABLET | Refills: 12 | Status: CANCELLED | OUTPATIENT
Start: 2025-05-04

## 2025-05-07 RX ORDER — CARVEDILOL 25 MG/1
TABLET ORAL
Qty: 30 TABLET | Refills: 12 | Status: CANCELLED | OUTPATIENT
Start: 2025-05-04

## 2025-05-12 ENCOUNTER — PHARMACY VISIT (OUTPATIENT)
Dept: PHARMACY | Facility: MEDICAL CENTER | Age: 45
End: 2025-05-12
Payer: COMMERCIAL

## 2025-05-12 RX ORDER — SENNOSIDES 8.6 MG
TABLET ORAL
Qty: 60 TABLET | Refills: 2 | Status: CANCELLED | OUTPATIENT
Start: 2025-05-04

## 2025-05-12 RX ORDER — AMLODIPINE BESYLATE 10 MG/1
TABLET ORAL
Qty: 30 TABLET | Refills: 12 | Status: CANCELLED | OUTPATIENT
Start: 2025-05-04

## 2025-05-12 RX ORDER — CARVEDILOL 25 MG/1
TABLET ORAL
Qty: 30 TABLET | Refills: 12 | Status: CANCELLED | OUTPATIENT
Start: 2025-05-04

## 2025-05-15 ENCOUNTER — HOSPITAL ENCOUNTER (OUTPATIENT)
Dept: RADIOLOGY | Facility: MEDICAL CENTER | Age: 45
End: 2025-05-15
Attending: OBSTETRICS & GYNECOLOGY
Payer: MEDICARE

## 2025-05-15 DIAGNOSIS — N83.201 CYST OF RIGHT OVARY: ICD-10-CM

## 2025-05-15 PROCEDURE — 76830 TRANSVAGINAL US NON-OB: CPT

## 2025-05-16 ENCOUNTER — RESULTS FOLLOW-UP (OUTPATIENT)
Dept: OBGYN | Facility: MEDICAL CENTER | Age: 45
End: 2025-05-16
Payer: MEDICARE

## 2025-05-22 PROCEDURE — RXMED WILLOW AMBULATORY MEDICATION CHARGE: Performed by: PSYCHIATRY & NEUROLOGY

## 2025-05-26 ENCOUNTER — PHARMACY VISIT (OUTPATIENT)
Dept: PHARMACY | Facility: MEDICAL CENTER | Age: 45
End: 2025-05-26
Payer: COMMERCIAL

## 2025-05-26 RX ORDER — MECLIZINE HYDROCHLORIDE 25 MG/1
TABLET ORAL
Qty: 42 TABLET | Refills: 3 | OUTPATIENT
Start: 2025-05-14

## 2025-05-27 PROCEDURE — RXMED WILLOW AMBULATORY MEDICATION CHARGE

## 2025-05-30 PROCEDURE — RXMED WILLOW AMBULATORY MEDICATION CHARGE

## 2025-05-31 ENCOUNTER — PHARMACY VISIT (OUTPATIENT)
Dept: PHARMACY | Facility: MEDICAL CENTER | Age: 45
End: 2025-05-31
Payer: COMMERCIAL

## 2025-06-09 PROCEDURE — RXMED WILLOW AMBULATORY MEDICATION CHARGE: Performed by: PSYCHIATRY & NEUROLOGY

## 2025-06-10 ENCOUNTER — APPOINTMENT (OUTPATIENT)
Dept: RADIOLOGY | Facility: MEDICAL CENTER | Age: 45
End: 2025-06-10
Attending: STUDENT IN AN ORGANIZED HEALTH CARE EDUCATION/TRAINING PROGRAM
Payer: MEDICARE

## 2025-06-17 ENCOUNTER — PHARMACY VISIT (OUTPATIENT)
Dept: PHARMACY | Facility: MEDICAL CENTER | Age: 45
End: 2025-06-17
Payer: COMMERCIAL

## 2025-06-17 PROCEDURE — RXMED WILLOW AMBULATORY MEDICATION CHARGE: Performed by: OBSTETRICS & GYNECOLOGY

## 2025-06-17 PROCEDURE — RXMED WILLOW AMBULATORY MEDICATION CHARGE: Performed by: PSYCHIATRY & NEUROLOGY

## 2025-06-17 RX ORDER — DEXTROAMPHETAMINE SACCHARATE, AMPHETAMINE ASPARTATE, DEXTROAMPHETAMINE SULFATE AND AMPHETAMINE SULFATE 7.5; 7.5; 7.5; 7.5 MG/1; MG/1; MG/1; MG/1
TABLET ORAL
Qty: 60 TABLET | Refills: 0 | Status: CANCELLED | OUTPATIENT
Start: 2025-06-17

## 2025-06-17 RX ORDER — FERROUS SULFATE 325(65) MG
TABLET ORAL
Qty: 30 TABLET | Refills: 5 | Status: CANCELLED | OUTPATIENT
Start: 2025-06-17

## 2025-06-17 RX ORDER — MECLIZINE HYDROCHLORIDE 25 MG/1
TABLET ORAL
Qty: 42 TABLET | Refills: 3 | Status: CANCELLED | OUTPATIENT
Start: 2025-06-17

## 2025-06-20 ENCOUNTER — PHARMACY VISIT (OUTPATIENT)
Dept: PHARMACY | Facility: MEDICAL CENTER | Age: 45
End: 2025-06-20
Payer: COMMERCIAL

## 2025-06-20 RX ORDER — DEXTROAMPHETAMINE SACCHARATE, AMPHETAMINE ASPARTATE, DEXTROAMPHETAMINE SULFATE AND AMPHETAMINE SULFATE 7.5; 7.5; 7.5; 7.5 MG/1; MG/1; MG/1; MG/1
TABLET ORAL
Qty: 60 TABLET | Refills: 0 | Status: CANCELLED | OUTPATIENT
Start: 2025-06-17

## 2025-06-20 RX ORDER — MECLIZINE HYDROCHLORIDE 25 MG/1
TABLET ORAL
Qty: 42 TABLET | Refills: 3 | Status: CANCELLED | OUTPATIENT
Start: 2025-06-17

## 2025-06-25 PROCEDURE — RXMED WILLOW AMBULATORY MEDICATION CHARGE: Performed by: PSYCHIATRY & NEUROLOGY

## 2025-06-25 RX ORDER — MECLIZINE HYDROCHLORIDE 25 MG/1
TABLET ORAL
Qty: 42 TABLET | Refills: 3 | Status: CANCELLED | OUTPATIENT
Start: 2025-06-25

## 2025-06-26 PROCEDURE — RXMED WILLOW AMBULATORY MEDICATION CHARGE

## 2025-06-28 ENCOUNTER — PHARMACY VISIT (OUTPATIENT)
Dept: PHARMACY | Facility: MEDICAL CENTER | Age: 45
End: 2025-06-28
Payer: COMMERCIAL

## 2025-07-07 ENCOUNTER — APPOINTMENT (OUTPATIENT)
Dept: RADIOLOGY | Facility: MEDICAL CENTER | Age: 45
End: 2025-07-07
Attending: STUDENT IN AN ORGANIZED HEALTH CARE EDUCATION/TRAINING PROGRAM
Payer: MEDICARE

## 2025-07-09 PROCEDURE — RXMED WILLOW AMBULATORY MEDICATION CHARGE: Performed by: PSYCHIATRY & NEUROLOGY

## 2025-07-10 PROCEDURE — RXMED WILLOW AMBULATORY MEDICATION CHARGE: Performed by: PSYCHIATRY & NEUROLOGY

## 2025-07-14 ENCOUNTER — PHARMACY VISIT (OUTPATIENT)
Dept: PHARMACY | Facility: MEDICAL CENTER | Age: 45
End: 2025-07-14
Payer: COMMERCIAL

## 2025-07-22 PROCEDURE — RXMED WILLOW AMBULATORY MEDICATION CHARGE

## 2025-07-22 PROCEDURE — RXMED WILLOW AMBULATORY MEDICATION CHARGE: Performed by: PSYCHIATRY & NEUROLOGY

## 2025-07-22 PROCEDURE — RXMED WILLOW AMBULATORY MEDICATION CHARGE: Performed by: STUDENT IN AN ORGANIZED HEALTH CARE EDUCATION/TRAINING PROGRAM

## 2025-07-23 ENCOUNTER — PHARMACY VISIT (OUTPATIENT)
Dept: PHARMACY | Facility: MEDICAL CENTER | Age: 45
End: 2025-07-23
Payer: COMMERCIAL

## 2025-08-06 PROCEDURE — RXMED WILLOW AMBULATORY MEDICATION CHARGE

## 2025-08-11 ENCOUNTER — PHARMACY VISIT (OUTPATIENT)
Dept: PHARMACY | Facility: MEDICAL CENTER | Age: 45
End: 2025-08-11
Payer: COMMERCIAL

## 2025-08-12 DIAGNOSIS — N93.9 ABNORMAL UTERINE BLEEDING: ICD-10-CM

## 2025-08-12 DIAGNOSIS — N39.46 MIXED STRESS AND URGE URINARY INCONTINENCE: ICD-10-CM

## 2025-08-12 PROCEDURE — RXMED WILLOW AMBULATORY MEDICATION CHARGE: Performed by: PSYCHIATRY & NEUROLOGY

## 2025-08-12 RX ORDER — MEDROXYPROGESTERONE ACETATE 10 MG
10 TABLET ORAL DAILY
Qty: 30 TABLET | Refills: 1 | OUTPATIENT
Start: 2025-08-12

## 2025-08-12 RX ORDER — MIRABEGRON 25 MG/1
25 TABLET, FILM COATED, EXTENDED RELEASE ORAL DAILY
Qty: 90 TABLET | Refills: 3 | OUTPATIENT
Start: 2025-08-12 | End: 2026-08-07

## 2025-08-14 ENCOUNTER — PHARMACY VISIT (OUTPATIENT)
Dept: PHARMACY | Facility: MEDICAL CENTER | Age: 45
End: 2025-08-14
Payer: COMMERCIAL

## 2025-08-17 PROCEDURE — RXMED WILLOW AMBULATORY MEDICATION CHARGE

## 2025-08-17 PROCEDURE — RXMED WILLOW AMBULATORY MEDICATION CHARGE: Performed by: PSYCHIATRY & NEUROLOGY

## 2025-08-20 ENCOUNTER — HOSPITAL ENCOUNTER (EMERGENCY)
Facility: MEDICAL CENTER | Age: 45
End: 2025-08-20
Attending: EMERGENCY MEDICINE
Payer: COMMERCIAL

## 2025-08-20 ENCOUNTER — APPOINTMENT (OUTPATIENT)
Dept: RADIOLOGY | Facility: MEDICAL CENTER | Age: 45
End: 2025-08-20
Attending: EMERGENCY MEDICINE
Payer: COMMERCIAL

## 2025-08-20 ENCOUNTER — PHARMACY VISIT (OUTPATIENT)
Dept: PHARMACY | Facility: MEDICAL CENTER | Age: 45
End: 2025-08-20
Payer: COMMERCIAL

## 2025-08-20 VITALS
OXYGEN SATURATION: 100 % | BODY MASS INDEX: 31.68 KG/M2 | TEMPERATURE: 97.8 F | SYSTOLIC BLOOD PRESSURE: 116 MMHG | RESPIRATION RATE: 18 BRPM | HEART RATE: 98 BPM | HEIGHT: 66 IN | WEIGHT: 197.09 LBS | DIASTOLIC BLOOD PRESSURE: 73 MMHG

## 2025-08-20 DIAGNOSIS — R07.9 CHEST PAIN, UNSPECIFIED TYPE: Primary | ICD-10-CM

## 2025-08-20 DIAGNOSIS — R06.00 DYSPNEA, UNSPECIFIED TYPE: ICD-10-CM

## 2025-08-20 LAB
ALBUMIN SERPL BCP-MCNC: 4.1 G/DL (ref 3.2–4.9)
ALBUMIN/GLOB SERPL: 1.2 G/DL
ALP SERPL-CCNC: 80 U/L (ref 30–99)
ALT SERPL-CCNC: 10 U/L (ref 2–50)
ANION GAP SERPL CALC-SCNC: 13 MMOL/L (ref 7–16)
AST SERPL-CCNC: 14 U/L (ref 12–45)
B-OH-BUTYR SERPL-MCNC: 0.07 MMOL/L (ref 0.02–0.27)
BASE EXCESS BLDV CALC-SCNC: -8 MMOL/L (ref -2–3)
BASOPHILS # BLD AUTO: 1 % (ref 0–1.8)
BASOPHILS # BLD: 0.06 K/UL (ref 0–0.12)
BILIRUB SERPL-MCNC: 0.3 MG/DL (ref 0.1–1.5)
BODY TEMPERATURE: 36.1 CENTIGRADE
BUN SERPL-MCNC: 14 MG/DL (ref 8–22)
CALCIUM ALBUM COR SERPL-MCNC: 9.2 MG/DL (ref 8.5–10.5)
CALCIUM SERPL-MCNC: 9.3 MG/DL (ref 8.5–10.5)
CHLORIDE SERPL-SCNC: 109 MMOL/L (ref 96–112)
CO2 SERPL-SCNC: 17 MMOL/L (ref 20–33)
CREAT SERPL-MCNC: 0.83 MG/DL (ref 0.5–1.4)
D DIMER PPP IA.FEU-MCNC: <0.27 UG/ML (FEU) (ref 0–0.5)
EKG IMPRESSION: NORMAL
EOSINOPHIL # BLD AUTO: 0.23 K/UL (ref 0–0.51)
EOSINOPHIL NFR BLD: 4 % (ref 0–6.9)
ERYTHROCYTE [DISTWIDTH] IN BLOOD BY AUTOMATED COUNT: 41.1 FL (ref 35.9–50)
EST. AVERAGE GLUCOSE BLD GHB EST-MCNC: 111 MG/DL
FLUAV RNA SPEC QL NAA+PROBE: NEGATIVE
FLUBV RNA SPEC QL NAA+PROBE: NEGATIVE
GFR SERPLBLD CREATININE-BSD FMLA CKD-EPI: 88 ML/MIN/1.73 M 2
GLOBULIN SER CALC-MCNC: 3.3 G/DL (ref 1.9–3.5)
GLUCOSE SERPL-MCNC: 116 MG/DL (ref 65–99)
HBA1C MFR BLD: 5.5 % (ref 4–5.6)
HCG SERPL QL: NEGATIVE
HCO3 BLDV-SCNC: 15 MMOL/L (ref 22–29)
HCT VFR BLD AUTO: 42.1 % (ref 37–47)
HGB BLD-MCNC: 13.7 G/DL (ref 12–16)
IMM GRANULOCYTES # BLD AUTO: 0.01 K/UL (ref 0–0.11)
IMM GRANULOCYTES NFR BLD AUTO: 0.2 % (ref 0–0.9)
LYMPHOCYTES # BLD AUTO: 2.47 K/UL (ref 1–4.8)
LYMPHOCYTES NFR BLD: 43.1 % (ref 22–41)
MCH RBC QN AUTO: 27.9 PG (ref 27–33)
MCHC RBC AUTO-ENTMCNC: 32.5 G/DL (ref 32.2–35.5)
MCV RBC AUTO: 85.7 FL (ref 81.4–97.8)
MONOCYTES # BLD AUTO: 0.31 K/UL (ref 0–0.85)
MONOCYTES NFR BLD AUTO: 5.4 % (ref 0–13.4)
NEUTROPHILS # BLD AUTO: 2.65 K/UL (ref 1.82–7.42)
NEUTROPHILS NFR BLD: 46.3 % (ref 44–72)
NRBC # BLD AUTO: 0 K/UL
NRBC BLD-RTO: 0 /100 WBC (ref 0–0.2)
NT-PROBNP SERPL IA-MCNC: <36 PG/ML (ref 0–125)
PCO2 BLDV: 24.4 MMHG (ref 38–54)
PCO2 TEMP ADJ BLDV: 23.4 MMHG (ref 38–54)
PH BLDV: 7.4 [PH] (ref 7.31–7.45)
PH TEMP ADJ BLDV: 7.41 [PH] (ref 7.31–7.45)
PLATELET # BLD AUTO: 411 K/UL (ref 164–446)
PMV BLD AUTO: 10 FL (ref 9–12.9)
PO2 BLDV: 41 MMHG (ref 23–48)
PO2 TEMP ADJ BLDV: 38.5 MMHG (ref 23–48)
POTASSIUM SERPL-SCNC: 3.7 MMOL/L (ref 3.6–5.5)
PROT SERPL-MCNC: 7.4 G/DL (ref 6–8.2)
RBC # BLD AUTO: 4.91 M/UL (ref 4.2–5.4)
RSV RNA SPEC QL NAA+PROBE: NEGATIVE
SAO2 % BLDV: 59 % (ref 60–85)
SARS-COV-2 RNA RESP QL NAA+PROBE: NEGATIVE
SODIUM SERPL-SCNC: 139 MMOL/L (ref 135–145)
TROPONIN T SERPL-MCNC: <6 NG/L (ref 6–19)
WBC # BLD AUTO: 5.7 K/UL (ref 4.8–10.8)

## 2025-08-20 PROCEDURE — 84703 CHORIONIC GONADOTROPIN ASSAY: CPT

## 2025-08-20 PROCEDURE — 96361 HYDRATE IV INFUSION ADD-ON: CPT

## 2025-08-20 PROCEDURE — 93005 ELECTROCARDIOGRAM TRACING: CPT | Mod: TC

## 2025-08-20 PROCEDURE — 83036 HEMOGLOBIN GLYCOSYLATED A1C: CPT

## 2025-08-20 PROCEDURE — 96360 HYDRATION IV INFUSION INIT: CPT

## 2025-08-20 PROCEDURE — 82803 BLOOD GASES ANY COMBINATION: CPT

## 2025-08-20 PROCEDURE — 700105 HCHG RX REV CODE 258: Performed by: EMERGENCY MEDICINE

## 2025-08-20 PROCEDURE — 85025 COMPLETE CBC W/AUTO DIFF WBC: CPT

## 2025-08-20 PROCEDURE — A9270 NON-COVERED ITEM OR SERVICE: HCPCS | Performed by: EMERGENCY MEDICINE

## 2025-08-20 PROCEDURE — 80053 COMPREHEN METABOLIC PANEL: CPT

## 2025-08-20 PROCEDURE — 71045 X-RAY EXAM CHEST 1 VIEW: CPT

## 2025-08-20 PROCEDURE — 87637 SARSCOV2&INF A&B&RSV AMP PRB: CPT | Performed by: EMERGENCY MEDICINE

## 2025-08-20 PROCEDURE — 85379 FIBRIN DEGRADATION QUANT: CPT

## 2025-08-20 PROCEDURE — 700102 HCHG RX REV CODE 250 W/ 637 OVERRIDE(OP): Performed by: EMERGENCY MEDICINE

## 2025-08-20 PROCEDURE — 99284 EMERGENCY DEPT VISIT MOD MDM: CPT

## 2025-08-20 PROCEDURE — 82010 KETONE BODYS QUAN: CPT

## 2025-08-20 PROCEDURE — 84484 ASSAY OF TROPONIN QUANT: CPT

## 2025-08-20 PROCEDURE — 83880 ASSAY OF NATRIURETIC PEPTIDE: CPT

## 2025-08-20 PROCEDURE — 36415 COLL VENOUS BLD VENIPUNCTURE: CPT

## 2025-08-20 PROCEDURE — 93005 ELECTROCARDIOGRAM TRACING: CPT | Mod: TC | Performed by: EMERGENCY MEDICINE

## 2025-08-20 RX ORDER — SODIUM CHLORIDE 9 MG/ML
1000 INJECTION, SOLUTION INTRAVENOUS ONCE
Status: COMPLETED | OUTPATIENT
Start: 2025-08-20 | End: 2025-08-20

## 2025-08-20 RX ORDER — DEXTROAMPHETAMINE SACCHARATE, AMPHETAMINE ASPARTATE, DEXTROAMPHETAMINE SULFATE AND AMPHETAMINE SULFATE 7.5; 7.5; 7.5; 7.5 MG/1; MG/1; MG/1; MG/1
TABLET ORAL
Qty: 60 TABLET | Refills: 0 | Status: CANCELLED | OUTPATIENT
Start: 2025-08-17

## 2025-08-20 RX ORDER — ALBUTEROL SULFATE 90 UG/1
2 INHALANT RESPIRATORY (INHALATION) ONCE
Status: COMPLETED | OUTPATIENT
Start: 2025-08-20 | End: 2025-08-20

## 2025-08-20 RX ADMIN — SODIUM CHLORIDE 1000 ML: 9 INJECTION, SOLUTION INTRAVENOUS at 09:14

## 2025-08-20 RX ADMIN — ALBUTEROL SULFATE 2 PUFF: 90 AEROSOL, METERED RESPIRATORY (INHALATION) at 09:59

## 2025-08-20 ASSESSMENT — LIFESTYLE VARIABLES
SKIP TO QUESTIONS 9-10: 1
HOW OFTEN DO YOU HAVE A DRINK CONTAINING ALCOHOL: NEVER
HOW MANY STANDARD DRINKS CONTAINING ALCOHOL DO YOU HAVE ON A TYPICAL DAY: PATIENT DOES NOT DRINK
HOW OFTEN DO YOU HAVE SIX OR MORE DRINKS ON ONE OCCASION: NEVER
AUDIT-C TOTAL SCORE: 0

## 2025-08-20 ASSESSMENT — FIBROSIS 4 INDEX: FIB4 SCORE: 0.29

## 2025-08-20 ASSESSMENT — HEART SCORE
RISK FACTORS: NO KNOWN RISK FACTORS
HEART SCORE: 1
AGE: 45-64
HISTORY: SLIGHTLY SUSPICIOUS
TROPONIN: LESS THAN OR EQUAL TO NORMAL LIMIT
ECG: NORMAL

## 2025-08-22 PROCEDURE — RXMED WILLOW AMBULATORY MEDICATION CHARGE

## 2025-08-24 ENCOUNTER — PHARMACY VISIT (OUTPATIENT)
Dept: PHARMACY | Facility: MEDICAL CENTER | Age: 45
End: 2025-08-24
Payer: COMMERCIAL

## (undated) DEVICE — GOWN SURGEONS X-LARGE - DISP. (30/CA)

## (undated) DEVICE — JELLY SURGILUBE STERILE TUBE 4.25 OZ (1/EA)

## (undated) DEVICE — SET IRRIGATION CYSTOSCOPY TUBE L80 IN (20EA/CA)

## (undated) DEVICE — LACTATED RINGERS INJ 1000 ML - (14EA/CA 60CA/PF)

## (undated) DEVICE — SET LEADWIRE 5 LEAD BEDSIDE DISPOSABLE ECG (1SET OF 5/EA)

## (undated) DEVICE — SYRINGE STERILE 10 ML LL (200/BX)

## (undated) DEVICE — CATHETER URET DUAL LUMEN

## (undated) DEVICE — BAG DRAINAGE LINGEMAN CYSTO FOR GE/OEC 2600/2800 TABLES (20EA/CA)

## (undated) DEVICE — CANNULA O2 COMFORT SOFT EAR ADULT 7 FT TUBING (50/CA)

## (undated) DEVICE — CANISTER SUCTION 3000ML MECHANICAL FILTER AUTO SHUTOFF MEDI-VAC NONSTERILE LF DISP (40EA/CA)

## (undated) DEVICE — WATER IRRIGATION STERILE 1000ML (12EA/CA)

## (undated) DEVICE — SLEEVE, VASO, THIGH, MED

## (undated) DEVICE — JELLY SURGILUBE STERILE FOIL 5 GM (150EA/BX)

## (undated) DEVICE — COVER LIGHT HANDLE FLEXIBLE - SOFT (2EA/PK 80PK/CA)

## (undated) DEVICE — TUBING CLEARLINK DUO-VENT - C-FLO (48EA/CA)

## (undated) DEVICE — SET EXTENSION WITH 2 PORTS (48EA/CA) ***PART #2C8610 IS A SUBSTITUTE*****

## (undated) DEVICE — GLOVE PF LATEX FREE MEDIUM - (150/BX 10BX/CA)

## (undated) DEVICE — SENSOR OXIMETER ADULT SPO2 RD SET (20EA/BX)

## (undated) DEVICE — SET IRRIGATION CYSTOSCOPY Y-TYPE L81 IN (20EA/CA)

## (undated) DEVICE — PACK CYSTOSCOPY III - (8/CA)

## (undated) DEVICE — TUBE CONNECTING SUCTION - CLEAR PLASTIC STERILE 72 IN (50EA/CA)

## (undated) DEVICE — MEDICINE CUP STERILE 2 OZ - (100/CA)

## (undated) DEVICE — SUCTION INSTRUMENT YANKAUER BULBOUS TIP W/O VENT (50EA/CA)

## (undated) DEVICE — WATER IRRIG. STER 3000 ML - (4/CA)

## (undated) DEVICE — WIRE GUIDE SENSOR DUAL FLEX - 5/BX

## (undated) DEVICE — GOWN WARMING STANDARD FLEX - (30/CA)

## (undated) DEVICE — CONTAINER SPECIMEN BAG OR - STERILE 4 OZ W/LID (100EA/CA)

## (undated) DEVICE — Device

## (undated) DEVICE — BAG SPONGE COUNT 10.25 X 32 - BLUE (250/CA)

## (undated) DEVICE — SLEEVE VASO DVT COMPRESSION CALF MED - (10PR/CA)

## (undated) DEVICE — PAD SANITARY 11IN MAXI IND WRAPPED (12EA/PK 24PK/CA)

## (undated) DEVICE — KIT  I.V. START (100EA/CA)

## (undated) DEVICE — GLOVE BIOGEL SZ 6.5 SURGICAL PF LTX (50PR/BX 4BX/CA)

## (undated) DEVICE — SHEATH ACCESS URETERAL NAVIGATOR HD STAINLESS STEEL HYDROPHILIC L36 CM OD11-13 FR (1EA)

## (undated) DEVICE — BASKET ZERO 1.9FR X 120CM

## (undated) DEVICE — SCOPE DIGITAL URETEROSCOPE DISPOSABLE (10EA/PK)

## (undated) DEVICE — CONNECTOR HOSE NEPTUNE FOR CYSTO ROOM

## (undated) DEVICE — SUTURE GENERAL

## (undated) DEVICE — COVER LIGHT HANDLE ALC PLUS DISP (18EA/BX)

## (undated) DEVICE — SPONGE GAUZESTER 4 X 4 4PLY - (128PK/CA)

## (undated) DEVICE — COVER FOOT UNIVERSAL DISP. - (25EA/CA)

## (undated) DEVICE — SODIUM CHL. IRRIGATION 0.9% 3000ML (4EA/CA 65CA/PF)

## (undated) DEVICE — GOWN SURGICAL X-LARGE ULTRA - FILM-REINFORCED (20/CA)

## (undated) DEVICE — ELECTRODE DUAL RETURN W/ CORD - (50/PK)

## (undated) DEVICE — MASK OXYGEN VNYL ADLT MED CONC WITH 7 FOOT TUBING - (50EA/CA)

## (undated) DEVICE — CANISTER SUCTION RIGID RED 1500CC (40EA/CA)

## (undated) DEVICE — TOWEL STOP TIMEOUT SAFETY FLAG (40EA/CA)

## (undated) DEVICE — SODIUM CHL IRRIGATION 0.9% 1000ML (12EA/CA)